# Patient Record
Sex: FEMALE | Race: ASIAN | NOT HISPANIC OR LATINO | Employment: UNEMPLOYED | URBAN - METROPOLITAN AREA
[De-identification: names, ages, dates, MRNs, and addresses within clinical notes are randomized per-mention and may not be internally consistent; named-entity substitution may affect disease eponyms.]

---

## 2021-04-30 ENCOUNTER — IMMUNIZATION (OUTPATIENT)
Dept: NURSING | Facility: CLINIC | Age: 21
End: 2021-04-30
Payer: COMMERCIAL

## 2021-04-30 PROCEDURE — 0001A PR COVID VAC PFIZER DIL RECON 30 MCG/0.3 ML IM: CPT

## 2021-04-30 PROCEDURE — 91300 PR COVID VAC PFIZER DIL RECON 30 MCG/0.3 ML IM: CPT

## 2021-05-02 ENCOUNTER — HEALTH MAINTENANCE LETTER (OUTPATIENT)
Age: 21
End: 2021-05-02

## 2021-05-21 ENCOUNTER — IMMUNIZATION (OUTPATIENT)
Dept: NURSING | Facility: CLINIC | Age: 21
End: 2021-05-21
Attending: INTERNAL MEDICINE
Payer: COMMERCIAL

## 2021-05-21 PROCEDURE — 0002A PR COVID VAC PFIZER DIL RECON 30 MCG/0.3 ML IM: CPT

## 2021-05-21 PROCEDURE — 91300 PR COVID VAC PFIZER DIL RECON 30 MCG/0.3 ML IM: CPT

## 2021-10-17 ENCOUNTER — HEALTH MAINTENANCE LETTER (OUTPATIENT)
Age: 21
End: 2021-10-17

## 2022-05-29 ENCOUNTER — HEALTH MAINTENANCE LETTER (OUTPATIENT)
Age: 22
End: 2022-05-29

## 2022-09-23 ENCOUNTER — PRENATAL OFFICE VISIT (OUTPATIENT)
Dept: MIDWIFE SERVICES | Facility: CLINIC | Age: 22
End: 2022-09-23
Payer: COMMERCIAL

## 2022-09-23 ENCOUNTER — HOSPITAL ENCOUNTER (OUTPATIENT)
Dept: ULTRASOUND IMAGING | Facility: HOSPITAL | Age: 22
Discharge: HOME | End: 2022-09-23
Attending: ADVANCED PRACTICE MIDWIFE | Admitting: ADVANCED PRACTICE MIDWIFE
Payer: COMMERCIAL

## 2022-09-23 ENCOUNTER — DOCUMENTATION ONLY (OUTPATIENT)
Dept: OBGYN | Facility: CLINIC | Age: 22
End: 2022-09-23

## 2022-09-23 VITALS
HEART RATE: 72 BPM | HEIGHT: 60 IN | BODY MASS INDEX: 44.37 KG/M2 | DIASTOLIC BLOOD PRESSURE: 76 MMHG | WEIGHT: 226 LBS | SYSTOLIC BLOOD PRESSURE: 118 MMHG

## 2022-09-23 DIAGNOSIS — Z91.89 AT RISK FOR VENOUS THROMBOEMBOLISM (VTE): ICD-10-CM

## 2022-09-23 DIAGNOSIS — Z13.31 POSITIVE DEPRESSION SCREENING: ICD-10-CM

## 2022-09-23 DIAGNOSIS — Z34.01 ENCOUNTER FOR SUPERVISION OF NORMAL FIRST PREGNANCY IN FIRST TRIMESTER: Primary | ICD-10-CM

## 2022-09-23 DIAGNOSIS — Z34.01 ENCOUNTER FOR SUPERVISION OF NORMAL FIRST PREGNANCY IN FIRST TRIMESTER: ICD-10-CM

## 2022-09-23 DIAGNOSIS — Z91.89 AT RISK FOR COMPLICATION OF PREGNANCY: ICD-10-CM

## 2022-09-23 LAB
ABO/RH(D): NORMAL
ANTIBODY SCREEN: NEGATIVE
ERYTHROCYTE [DISTWIDTH] IN BLOOD BY AUTOMATED COUNT: 14.1 % (ref 10–15)
HCT VFR BLD AUTO: 39.7 % (ref 35–47)
HGB BLD-MCNC: 13.2 G/DL (ref 11.7–15.7)
MCH RBC QN AUTO: 27 PG (ref 26.5–33)
MCHC RBC AUTO-ENTMCNC: 33.2 G/DL (ref 31.5–36.5)
MCV RBC AUTO: 81 FL (ref 78–100)
PLATELET # BLD AUTO: 249 10E3/UL (ref 150–450)
RBC # BLD AUTO: 4.89 10E6/UL (ref 3.8–5.2)
SPECIMEN EXPIRATION DATE: NORMAL
SPECIMEN EXPIRATION DATE: NORMAL
WBC # BLD AUTO: 8.7 10E3/UL (ref 4–11)

## 2022-09-23 PROCEDURE — 86780 TREPONEMA PALLIDUM: CPT | Performed by: ADVANCED PRACTICE MIDWIFE

## 2022-09-23 PROCEDURE — 99205 OFFICE O/P NEW HI 60 MIN: CPT | Performed by: ADVANCED PRACTICE MIDWIFE

## 2022-09-23 PROCEDURE — 86900 BLOOD TYPING SEROLOGIC ABO: CPT | Performed by: ADVANCED PRACTICE MIDWIFE

## 2022-09-23 PROCEDURE — 99207 PR FIRST OB VISIT: CPT | Performed by: ADVANCED PRACTICE MIDWIFE

## 2022-09-23 PROCEDURE — 86803 HEPATITIS C AB TEST: CPT | Performed by: ADVANCED PRACTICE MIDWIFE

## 2022-09-23 PROCEDURE — 86762 RUBELLA ANTIBODY: CPT | Performed by: ADVANCED PRACTICE MIDWIFE

## 2022-09-23 PROCEDURE — 36415 COLL VENOUS BLD VENIPUNCTURE: CPT | Performed by: ADVANCED PRACTICE MIDWIFE

## 2022-09-23 PROCEDURE — 87086 URINE CULTURE/COLONY COUNT: CPT | Performed by: ADVANCED PRACTICE MIDWIFE

## 2022-09-23 PROCEDURE — 86901 BLOOD TYPING SEROLOGIC RH(D): CPT | Performed by: ADVANCED PRACTICE MIDWIFE

## 2022-09-23 PROCEDURE — 87340 HEPATITIS B SURFACE AG IA: CPT | Performed by: ADVANCED PRACTICE MIDWIFE

## 2022-09-23 PROCEDURE — 85027 COMPLETE CBC AUTOMATED: CPT | Performed by: ADVANCED PRACTICE MIDWIFE

## 2022-09-23 PROCEDURE — 87389 HIV-1 AG W/HIV-1&-2 AB AG IA: CPT | Performed by: ADVANCED PRACTICE MIDWIFE

## 2022-09-23 PROCEDURE — 87591 N.GONORRHOEAE DNA AMP PROB: CPT | Performed by: ADVANCED PRACTICE MIDWIFE

## 2022-09-23 PROCEDURE — 87491 CHLMYD TRACH DNA AMP PROBE: CPT | Performed by: ADVANCED PRACTICE MIDWIFE

## 2022-09-23 PROCEDURE — 86850 RBC ANTIBODY SCREEN: CPT | Performed by: ADVANCED PRACTICE MIDWIFE

## 2022-09-23 PROCEDURE — 76801 OB US < 14 WKS SINGLE FETUS: CPT

## 2022-09-23 NOTE — PROGRESS NOTES
PRENATAL VISIT   FIRST OBSTETRICAL EXAM - OB     Assessment / Impression   1.  22-year-old G1, P0 with IUP at 11 weeks 2 days by certain LMP with 28-day cycles, DEYA 4/12/2023 (although she predicts that she ovulated later in her cycle on July 29 predicting EGA 10 weeks 0 days, DEYA 4/21/2023)  2.  Pregravid BMI 43  3.  At risk for antepartum VTE (pregravid BMI greater than 40)  4.  Positive depression screening  5.  At risk for pregnancy complication (Preeclampsia: [place], Pregravid obesity AND GDM: Pregravid obesity,  descent)    Plan:   -Plan to start aspirin 81 mg 1 p.o. daily after 12 weeks of pregnancy due to preeclampsia risk factors: First baby and pregravid BMI greater than 40 (43).  -IOB labs drawn.   -Pt is a candidate for drawing lead level per [facility] screening tool.  Plan to draw next visit.  -Reviewed prenatal care schedule.   -Optimal nutrition and weight gain discussed. Pregnancy weight gain of no weight gain (BMI 40 or greater) encouraged.   -Anticipatory guidance for common pregnancy questions and concerns reviewed.   -Danger s/sx for this trimester reviewed with patient.   -Reviewed genetic screening options with patient, patient Is uncertain regarding first trimester screening. The patient Is uncertain regarding quad screening.   -Reviewed carrier testing options with patient, patient Is uncertain.    -IOB packet given and reviewed with patient.   -CN services and hospital options reviewed; emergency and scheduling phone numbers given to patient.   -Because the patient does have 2 or more moderate risk factors, low-dose aspirin will be initiated at 12 weeks.   -Antepartum VTE risk factors present.  -Patient is at increased risk for overt diabetes, so early 1 hour GCT will be ordered next visit.    -Pt is not a candidate for an antepartum OB consult.    -Return to clinic in 4 weeks or sooner as needed.    Total time: 60 minutes spent on the date of the encounter doing chart review, review of  test results, patient visit and documentation.     Subjective:   Amanda Miller is a 22 year old G 1 P 0 here today for her first obstetrical exam at 11 w 2 d. Here with , Rojelio. This pregnancy is planned. The patient reports nausea, but no vomiting, fatigue and some mild breast tenderness.  Patient's last menstrual period was 2022., predicting an expected date of delivery of Estimated Date of Delivery: 2023. Last period was normal. Her previous three cycles were normal. Her pregnancy is dated by certain LMP.  Presumed ovulation date (utilizing luteinizing hormone ovulation tests) 2022 predicting DEYA 2023 (EGA 10 weeks 0 days).     The patient states that she is in a monogamous relationship. Offered GC/CT screening today and patient accepts.  Current symptoms also include: breast tenderness, fatigue, morning sickness, nausea and positive home pregnancy test.     Risk factors: pre-pregnancy obesity. Pregnancy Risk Factors:Significantly overweight or underweight    The patient has the following high risk factors for preeclampsia:none. The patient has the following moderate risk factors for preeclampsia:first pregnancy and BMI greater than 30.     The patient has the following antepartum risk factors for VTE (two or more risk factors, or 1 * risk factor, places patient at higher risk): *BMI greater or equal to 40, current.   Clinical history/risk factors requiring antepartum OB consult: none.     The patient has the following risk factors for overt diabetes: Body mass index greater than or equal to 25 kg/m2. Plus the additional risk factor(s) of: High-risk race/ethnicity (eg, , , ,  American, ).    Social History:   Education level: Some college  Occupation: Pharmacy technician  Partners name: Rojelio   ?   OB History    Para Term  AB Living   1 0 0 0 0 0   SAB IAB Ectopic Multiple Live Births   0 0 0 0 0      #  "Outcome Date GA Lbr Carlos/2nd Weight Sex Delivery Anes PTL Lv   1 Current                 History:   Past Medical History:   Diagnosis Date     Obesity      Urinary tract infection       Past Surgical History:   Procedure Laterality Date     TONSILLECTOMY       WISDOM TOOTH EXTRACTION        Family History   Problem Relation Age of Onset     Depression Mother      Depression Father      Cancer Paternal Grandmother       Social History     Tobacco Use     Smoking status: Never Smoker     Smokeless tobacco: Never Used   Vaping Use     Vaping Use: Never used   Substance Use Topics     Alcohol use: Not Currently     Drug use: Not Currently      Current Outpatient Medications   Medication Sig Dispense Refill     Prenatal Vit-Fe Fumarate-FA (PRENATAL VITAMIN PO)         No Known Allergies     The patient's medical, surgical and family histories were reviewed and were pertinent to this visit.     Pap smear: Last Pap: 7/16/2021, Result: NILM, Previous History: None, Any history of abnormal: No. Next Due: 2024.     EPDS score today: 10 .\"  0\" to #10  ALL-7: 2, \"not difficult at all\"  History of anxiety or depression: no    Review of Systems   General: Fatigue but otherwise denies problem   Eyes: Denies problem   Ears/Nose/Throat: Denies problem   Cardiovascular: Denies problem   Respiratory: Denies problem   Gastrointestinal: Nausea without vomiting, otherwise negative   Genitourinary: Denies any discharge, vaginal bleeding or itchiness or any other problem   Musculoskeletal: Breast tenderness otherwise denies problem   Skin: Denies problem   Neurologic: Denies problem   Psychiatric: Denies problem   Endocrine: Denies problem   Heme/Lymphatic: Denies problem   Allergic/Immunologic: Denies problem         Objective:   Objective    Vitals:    09/23/22 1305   BP: 118/76   Pulse: 72   Weight: 102.5 kg (226 lb)   Height: 1.53 m (5' 0.25\")        Physical Exam:   General Appearance: Alert, cooperative, no distress, appears stated " age   ELIZABETH: Normocephalic, without obvious abnormality, atraumatic. Conjunctiva/corneas clear, does wear corrective lenses   Neck: Supple, symmetrical, trachea midline, no adenopathy.   Thyroid: not enlarged, symmetric, no tenderness/mass/nodules   Back: Symmetric, ROM normal, no CVA tenderness   Lungs: Clear to auscultation bilaterally, respirations unlabored   Heart: Regular rate and rhythm, S1 and S2 normal, no murmur. No edema to lower extremities.   Breasts: Deferred  Abdomen: Soft, non-tender.   FHT: 156 bpm  Pelvic exam: Deferred  Musculoskeletal: Extremities normal, atraumatic, no cyanosis   Skin: Skin color, texture, turgor normal, no rashes or lesions   Neurologic: Alert and oriented x 3. Normal speech

## 2022-09-23 NOTE — LETTER
September 23, 2022      Amanda Miller  [address]  [place] [address]        To Whom It May Concern:    Amanda Miller was seen on 9/23/2022.  She is 11 weeks 2 days pregnant with a due date of 4/12/2023.  She is experiencing some nausea of pregnancy which may have caused her to miss work.  I have recommended medications to alleviate her nausea of pregnancy.       Sincerely,        Romy Ding CNM

## 2022-09-24 PROBLEM — Z91.89 AT RISK FOR COMPLICATION OF PREGNANCY: Status: ACTIVE | Noted: 2022-09-24

## 2022-09-24 LAB
C TRACH DNA SPEC QL NAA+PROBE: NEGATIVE
HBV SURFACE AG SERPL QL IA: NONREACTIVE
HCV AB SERPL QL IA: NONREACTIVE
HIV 1+2 AB+HIV1 P24 AG SERPL QL IA: NONREACTIVE
N GONORRHOEA DNA SPEC QL NAA+PROBE: NEGATIVE
T PALLIDUM AB SER QL: NONREACTIVE

## 2022-09-25 LAB — BACTERIA UR CULT: NORMAL

## 2022-09-26 LAB
RUBV IGG SERPL QL IA: 1.15 INDEX
RUBV IGG SERPL QL IA: POSITIVE

## 2022-09-27 ASSESSMENT — PATIENT HEALTH QUESTIONNAIRE - PHQ9: 5. POOR APPETITE OR OVEREATING: NOT AT ALL

## 2022-09-27 ASSESSMENT — ANXIETY QUESTIONNAIRES
3. WORRYING TOO MUCH ABOUT DIFFERENT THINGS: NOT AT ALL
7. FEELING AFRAID AS IF SOMETHING AWFUL MIGHT HAPPEN: SEVERAL DAYS
IF YOU CHECKED OFF ANY PROBLEMS ON THIS QUESTIONNAIRE, HOW DIFFICULT HAVE THESE PROBLEMS MADE IT FOR YOU TO DO YOUR WORK, TAKE CARE OF THINGS AT HOME, OR GET ALONG WITH OTHER PEOPLE: NOT DIFFICULT AT ALL
1. FEELING NERVOUS, ANXIOUS, OR ON EDGE: NOT AT ALL
2. NOT BEING ABLE TO STOP OR CONTROL WORRYING: NOT AT ALL
GAD7 TOTAL SCORE: 2
6. BECOMING EASILY ANNOYED OR IRRITABLE: SEVERAL DAYS
GAD7 TOTAL SCORE: 2
5. BEING SO RESTLESS THAT IT IS HARD TO SIT STILL: NOT AT ALL

## 2022-10-02 ENCOUNTER — HEALTH MAINTENANCE LETTER (OUTPATIENT)
Age: 22
End: 2022-10-02

## 2022-10-21 ENCOUNTER — PRENATAL OFFICE VISIT (OUTPATIENT)
Dept: MIDWIFE SERVICES | Facility: CLINIC | Age: 22
End: 2022-10-21
Payer: COMMERCIAL

## 2022-10-21 VITALS
OXYGEN SATURATION: 99 % | WEIGHT: 224 LBS | DIASTOLIC BLOOD PRESSURE: 62 MMHG | HEART RATE: 69 BPM | HEIGHT: 60 IN | BODY MASS INDEX: 43.98 KG/M2 | SYSTOLIC BLOOD PRESSURE: 108 MMHG

## 2022-10-21 DIAGNOSIS — Z13.79 GENETIC SCREENING: ICD-10-CM

## 2022-10-21 DIAGNOSIS — Z91.89 AT RISK FOR COMPLICATION OF PREGNANCY: ICD-10-CM

## 2022-10-21 DIAGNOSIS — Z34.01 ENCOUNTER FOR SUPERVISION OF NORMAL FIRST PREGNANCY IN FIRST TRIMESTER: Primary | ICD-10-CM

## 2022-10-21 PROBLEM — F51.01 INSOMNIA, IDIOPATHIC: Status: ACTIVE | Noted: 2021-07-26

## 2022-10-21 PROBLEM — F51.01 INSOMNIA, IDIOPATHIC: Status: RESOLVED | Noted: 2021-07-26 | Resolved: 2022-10-21

## 2022-10-21 LAB — GLUCOSE 1H P 50 G GLC PO SERPL-MCNC: 127 MG/DL (ref 70–129)

## 2022-10-21 PROCEDURE — 99207 PR PRENATAL VISIT: CPT | Performed by: ADVANCED PRACTICE MIDWIFE

## 2022-10-21 PROCEDURE — 83655 ASSAY OF LEAD: CPT | Mod: 90 | Performed by: ADVANCED PRACTICE MIDWIFE

## 2022-10-21 PROCEDURE — 36415 COLL VENOUS BLD VENIPUNCTURE: CPT | Performed by: ADVANCED PRACTICE MIDWIFE

## 2022-10-21 PROCEDURE — 82950 GLUCOSE TEST: CPT | Performed by: ADVANCED PRACTICE MIDWIFE

## 2022-10-21 PROCEDURE — 99000 SPECIMEN HANDLING OFFICE-LAB: CPT | Performed by: ADVANCED PRACTICE MIDWIFE

## 2022-10-21 RX ORDER — ACETAMINOPHEN 500 MG
500-1000 TABLET ORAL EVERY 6 HOURS PRN
Status: ON HOLD | COMMUNITY
End: 2023-05-01

## 2022-10-21 NOTE — PROGRESS NOTES
Amanda presents to the clinic today with Rojelio.  All is well, especially considering nausea and vomiting of pregnancy has completely resolved!  Total weight gain thus far: -2 pounds in the setting of pregravid BMI 43.  She denies lower abdominal cramping or vaginal bleeding.  Initial OB lab results reviewed in their entirety.  She desires noninvasive prenatal testing obtained today with early 1 hour glucose challenge test and lead screening.  Encouraged to initiate aspirin 81 m p.o. daily due to risk for preeclampsia (first baby and pregravid BMI greater than 40).  Second trimester teaching completed.  Danger signs and symptoms reviewed.  All questions answered.  Encouraged to call or RTC with any questions, concerns, or as needed.

## 2022-10-24 LAB — LEAD BLDV-MCNC: <2 UG/DL

## 2022-10-26 LAB — SCANNED LAB RESULT: NORMAL

## 2022-11-17 ENCOUNTER — TRANSCRIBE ORDERS (OUTPATIENT)
Dept: MATERNAL FETAL MEDICINE | Facility: HOSPITAL | Age: 22
End: 2022-11-17

## 2022-11-17 ENCOUNTER — PRENATAL OFFICE VISIT (OUTPATIENT)
Dept: MIDWIFE SERVICES | Facility: CLINIC | Age: 22
End: 2022-11-17
Payer: COMMERCIAL

## 2022-11-17 VITALS
HEART RATE: 80 BPM | DIASTOLIC BLOOD PRESSURE: 62 MMHG | BODY MASS INDEX: 43 KG/M2 | WEIGHT: 222 LBS | SYSTOLIC BLOOD PRESSURE: 118 MMHG

## 2022-11-17 DIAGNOSIS — Z13.79 GENETIC SCREENING: ICD-10-CM

## 2022-11-17 DIAGNOSIS — Z34.02 ENCOUNTER FOR SUPERVISION OF NORMAL FIRST PREGNANCY IN SECOND TRIMESTER: Primary | ICD-10-CM

## 2022-11-17 DIAGNOSIS — Z34.01 ENCOUNTER FOR SUPERVISION OF NORMAL FIRST PREGNANCY IN FIRST TRIMESTER: ICD-10-CM

## 2022-11-17 DIAGNOSIS — O26.90 PREGNANCY RELATED CONDITION, ANTEPARTUM: Primary | ICD-10-CM

## 2022-11-17 DIAGNOSIS — Z91.89 AT RISK FOR COMPLICATION OF PREGNANCY: ICD-10-CM

## 2022-11-17 DIAGNOSIS — Z23 NEED FOR INFLUENZA VACCINATION: ICD-10-CM

## 2022-11-17 DIAGNOSIS — Z91.89 AT RISK FOR VENOUS THROMBOEMBOLISM (VTE): ICD-10-CM

## 2022-11-17 PROCEDURE — 99207 PR PRENATAL VISIT: CPT | Performed by: ADVANCED PRACTICE MIDWIFE

## 2022-11-17 PROCEDURE — 90471 IMMUNIZATION ADMIN: CPT | Performed by: ADVANCED PRACTICE MIDWIFE

## 2022-11-17 PROCEDURE — 90686 IIV4 VACC NO PRSV 0.5 ML IM: CPT | Performed by: ADVANCED PRACTICE MIDWIFE

## 2022-11-17 RX ORDER — ASPIRIN 81 MG/1
81 TABLET, CHEWABLE ORAL DAILY
Status: ON HOLD | COMMUNITY
End: 2023-05-01

## 2022-11-17 NOTE — PROGRESS NOTES
"Amanda presents with her  Rojelio.  VERY excited about upcoming fetal anatomy ultrasound for which a referral was made to [facility] for OB L2 US due to pregravid BMI 43.  Additionally, per our clinical practice guidelines, this writer entered an order for a fetal echocardiogram due to the same reason.  Morning sickness has passed, but, appetite is low and, \"I do not have any cravings!\"  Patient has lost 4 pounds thus far this pregnancy.  She denies lower abdominal pain, loss of fluid or vaginal bleeding.  This writer offered single marker AFP after noninvasive prenatal testing was performed on 10/21/2022 (negative for aneuploidy, XX) although patient DECLINES.  Accepting of influenza vaccine today.  Second trimester teaching completed.  Danger signs and symptoms reviewed.  All questions answered.  Encouraged to call or RTC with any questions, concerns, or as needed.  RTC in 4 weeks or sooner as needed.  "

## 2022-11-18 ENCOUNTER — PRE VISIT (OUTPATIENT)
Dept: MATERNAL FETAL MEDICINE | Facility: HOSPITAL | Age: 22
End: 2022-11-18

## 2022-11-23 ENCOUNTER — ANCILLARY PROCEDURE (OUTPATIENT)
Dept: ULTRASOUND IMAGING | Facility: HOSPITAL | Age: 22
End: 2022-11-23
Attending: ADVANCED PRACTICE MIDWIFE
Payer: COMMERCIAL

## 2022-11-23 ENCOUNTER — OFFICE VISIT (OUTPATIENT)
Dept: MATERNAL FETAL MEDICINE | Facility: HOSPITAL | Age: 22
End: 2022-11-23
Attending: ADVANCED PRACTICE MIDWIFE
Payer: COMMERCIAL

## 2022-11-23 ENCOUNTER — DOCUMENTATION ONLY (OUTPATIENT)
Dept: MIDWIFE SERVICES | Facility: CLINIC | Age: 22
End: 2022-11-23

## 2022-11-23 DIAGNOSIS — O26.90 PREGNANCY RELATED CONDITION, ANTEPARTUM: ICD-10-CM

## 2022-11-23 DIAGNOSIS — O99.212 MATERNAL OBESITY SYNDROME IN SECOND TRIMESTER: ICD-10-CM

## 2022-11-23 DIAGNOSIS — O28.3 ECHOGENIC INTRACARDIAC FOCUS OF FETUS ON PRENATAL ULTRASOUND: ICD-10-CM

## 2022-11-23 DIAGNOSIS — Z36.2 ENCOUNTER FOR FOLLOW-UP ULTRASOUND OF FETAL ANATOMY: Primary | ICD-10-CM

## 2022-11-23 PROCEDURE — 99202 OFFICE O/P NEW SF 15 MIN: CPT | Mod: 25 | Performed by: OBSTETRICS & GYNECOLOGY

## 2022-11-23 PROCEDURE — 76811 OB US DETAILED SNGL FETUS: CPT | Mod: 26 | Performed by: OBSTETRICS & GYNECOLOGY

## 2022-11-23 PROCEDURE — 76811 OB US DETAILED SNGL FETUS: CPT

## 2022-11-23 NOTE — PROGRESS NOTES
Amanda presents to [facility] clinic for ultrasound and consultation regarding maternal BMI > 40. Prenatal record was reviewed.    Impression:  1) Britt intrauterine pregnancy at 18w 2d gestational age.   2) An echogenic intracardiac focus is noted. Otherwise, none of the anomalies commonly detected by ultrasound were evident in the detailed fetal anatomic survey, however some views were seen suboptimally as outlined above.   3) Growth parameters and estimated fetal weight were consistent with established dates.  4) The amniotic fluid volume appeared normal.  5) Normal fetal activity for gestational age.  6) On transabdominal imaging the cervix appears long and closed.    Plan:  Thank-you for referring your patient for a comprehensive ultrasound.  She had cell-free DNA screening showing the expected amounts of chromosomes 21, 18 & 13.    I discussed the findings on today's ultrasound with the patient. There was a echogenic intracardiac foci seen on ultrasound today which is a very common finding. It has no structural or functional implications on cardiac function and further evaluation of EIF is not necessary either prenatally or postnatally. It is thought to be a weak marker of Down Syndrome however the patient has had NIPT drawn which was normal. As there are no other markers of aneuploidy, this finding is considered a variant of normal.     Follow-up is scheduled here in 4 weeks to reassess anatomy that was suboptimally seen today. We reviewed that if the fetal heart is well seen on follow up, there will be no indication for a fetal echocardiogram. We reviewed family history and there is no family history of congenital cardiac defects.    Following clearance of anatomy, serial ultrasounds to assess fetal growth are recommended every 4-6 weeks, with weekly  testing at 34 weeks due to maternal BMI > 40.    Return to primary provider for continued prenatal care.    If you have questions regarding today's  evaluation or if we can be of further service, please contact the [facility].    **Fetal anomalies may be present but not detected**    Patricia Cuenca MD  Maternal Fetal Medicine    Total time: 10 minutes

## 2022-12-01 ENCOUNTER — TELEPHONE (OUTPATIENT)
Dept: MIDWIFE SERVICES | Facility: CLINIC | Age: 22
End: 2022-12-01

## 2022-12-01 NOTE — TELEPHONE ENCOUNTER
"TC:  Late Entry  Amanda Murphy at 0151, call returned at 0204.  Reports went to Chiropractor yesterday and usually has adjustment while laying on abdomen, but was in a new position this time s/t pregnancy.  Reports since 0100 has felt pain in her back between her shoulder blades \"like an air bubble is in there\", worse when laying down, improved with sitting up and no pressure.  Not SOB or affected by breathing.  Feels \"like it needs to finish adjusting\".  Did take Tylenol and helping some.  Discussed okay for expectant management at home with warm/cold packs, menthol rubs, position changes like sleeping or resting in more upright positions.  If pain intolerable can go to ER for evaluation and pain control attempts.  If SOB or other more concerning symptoms proceed to urgent/emergent care.  Otherwise, advised to follow up with chiropractor in morning to discuss outcome of recent adjustment and ask for their advice.  No further questions, agrees with plan.    BLAYNE Rey CNM [facility]  12/1/2022 10:11 AM   "

## 2022-12-19 ENCOUNTER — PRENATAL OFFICE VISIT (OUTPATIENT)
Dept: MIDWIFE SERVICES | Facility: CLINIC | Age: 22
End: 2022-12-19
Payer: COMMERCIAL

## 2022-12-19 VITALS
SYSTOLIC BLOOD PRESSURE: 108 MMHG | BODY MASS INDEX: 42.61 KG/M2 | HEART RATE: 79 BPM | OXYGEN SATURATION: 98 % | DIASTOLIC BLOOD PRESSURE: 64 MMHG | WEIGHT: 220 LBS

## 2022-12-19 DIAGNOSIS — Z13.31 POSITIVE DEPRESSION SCREENING: ICD-10-CM

## 2022-12-19 DIAGNOSIS — Z34.02 ENCOUNTER FOR SUPERVISION OF NORMAL FIRST PREGNANCY IN SECOND TRIMESTER: Primary | ICD-10-CM

## 2022-12-19 PROCEDURE — 99207 PR PRENATAL VISIT: CPT | Performed by: ADVANCED PRACTICE MIDWIFE

## 2022-12-19 NOTE — PATIENT INSTRUCTIONS
"[facility] Nurse [facility] Contact information:  Appointment line and to get a hold of CNM in clinic Monday-Friday 8 am - 5 pm:  [phone].  There are some clinics with early start times (1st appointment 7:40 am) and others with evening hours (last appointment 6:20 pm).  Most are typically open from 8 am to 5 pm.    CNM on call answering service: [phone].  Specify your hospital of choice and leave a brief message for CNM;  will then page CNM who is on call at your specified hospital and you should receive a call back with 15 minutes.  Be sure that your ringer is audible and that you can accept blocked calls so that we can get back in touch with you! This number should be reserved for urgent needs if during the day, before 8 am, after 5 pm, weekends, holidays.    Contact the on-call CNM with warning signs, such as:  vaginal bleeding   Vaginal discharge and itching or pain and burning during urination  Leg/calf pain or swelling on one side  severe abdominal pain  nausea and vomiting more than 4-5 times a day, or if you are unable to keep anything down  fever more than 100.4 degrees F.   Rift.iohart  After each of your visits you are welcome to check Liquid Grids for your visit summary including education and links to information relevant to your pregnancy and/or well woman care.   Find the \"Visits\" tab at the top of the page, you will see a list of recent visits and for each visit a for link for \"View After Visit Summary.\" View of your After Visit Summary will allow you to read our recommendations from your visit, review any education provided, and link to websites with useful information.   If you have any questions or difficulty navigating [employer], please feel free to contact us and we will do our best to direct you.  Meet the Midwives from [facility]  You are invited to an informational meet and greet with [facility]'s [facility] Certified Nurse-Midwives. Our free " "\"Meet the Midwives\" event is a great opportunity to learn about our midwives' philosophy and experience, the hospitals where we can assist with your birth, and answer questions you may have. Partners, friends, and family are welcome to attend. Currently, this is a virtual event.  Date  First Tuesday of every month at 7 pm.    Link to next (live) meeting  https://www.Logical Lighting.Med Access/classes-and-events/meet-the-midwives-from-[facility]  To Join by Telephone (audio only) Call:   [phone] Phone Conference ID: [phone]#      UNDERSTANDING  LABOR    Going into labor before your 37th week of pregnancy is called  labor.  labor can cause your baby to be born too soon. This can lead to a number of health problems that may affect your baby. From 28-35 weeks, Patients are advised to be evaluated at [facility] since they have a  Intensive Care Unit (NICU).  -Before labor, the cervix is thick and closed.  -In  labor, the cervix begins to efface (thin) and dilate (open) over a short period of time    Symptoms of  Labor  If you believe you re having  labor, contact the midwives right away. But contractions alone don t mean you re in  labor. What matters more are changes in your cervix (the lower end of the uterus). Symptoms of  labor include:  five or more contractions per hour  Strong & frequent contractions  Constant menstrual-like cramping  Low-back pain  Mucous or bloody vaginal discharge  Bleeding or spotting in the second or third trimester    Evaluating  Labor  Your midwife will try to find out whether you re in  labor or whether you re just having contractions.She may watch you for a few hours. The following tests may be done:  Pelvic exam to see if your cervix has effaced (thinned) and dilated (opened)  Uterine activity monitoring to detect contractions  Fetal monitoring to check the health of your " baby  Ultrasound to check your baby s size and position    Caring for Yourself At Home  If you have contractions  but your cervix is still thick and closed, the midwife may ask you to do the following at home:  Drink plenty of water.  Do fewer activities.  Rest in bed on your side.  Avoid intercourse and nipple stimulation.    When to Call Your Midwife  Five or more contractions per hour  Bag of water breaks  Bleeding or spotting     If You Need Hospital Care   labor often requires that you have hospital care and complete bed rest. You may have an IV (intravenous) line to get fluids. And you may be given pills or an injection to help prevent contractions. Finally, you may receive medication (corticosteroids) that helps your baby s lungs mature more quickly.    Are You At Risk?  Any pregnant woman can have  labor. It may start for no reason. But these risk factors can increase your chances:  Past  labor or past early birth  Smoking and drug or alcohol use during pregnancy  Multiple fetuses (twins or more)  Problems with the shape of the uterus  Bleeding during the pregnancy    The Dangers of  Birth  A baby born too soon may have health problems. This is because the baby didn t have enough time to mature. The baby then is at risk of:  Not breastfeeding well  Having immature lungs  Bleeding in the brain  Dying    Reaching Term  Your goal is to get as close to term as you can before giving birth. The closer you get to term, the higher your chance of having a healthy baby. Work with your healthcare provider. Together, you can take steps that may keep you from giving birth too early.        Testing for Gestational Diabetes in Pregnancy  [facility] are committed to providing safe care during your pregnancy. We follow the recommendations of the American Diabetes Association and the American College of Obstetricians and Gynecologists to test all pregnant women for gestational  diabetes. Testing early in pregnancy (if you have risk factors) and testing all women between 26-28 weeks follows local and national guidelines for care during pregnancy. Clients who feel that they cannot consent to such testing, may choose to transfer their care to our consultant obstetricians.  What is the test?  Eat normally on the day of the test; a diet rich in protein, whole grains, and vegetables would be best. Avoid simple sugars, white flour products and juices prior to testing.  You will be asked to drink a 10 oz glucose beverage (50 gm, about the same as a can of root beer).  The product is not carbonated as it has to be consumed within 5 minutes. During the next hour, you are seen for a prenatal visit, but are asked to limit your activity around the clinic. Feel free to bring a book or your computer.   Any level less than 130 for this  glucose challenge test  is considered normal. If measured at 140 to 185, the diagnostic test, a  3 hour glucose tolerance test  will be conducted. If 2 or more readings are abnormal, the diagnosis of gestational diabetes is confirmed and a referral to a diabetes educator will be made. If the level is 185 or above, this confirms the diagnosis and a referral will be made without administering the 3 hour test.  A fasting blood sugar may be performed sometime in the first trimester if you have risk factors for the development of gestational diabetes such as a previous diagnosis or birth of a large baby or a close family relative with diabetes.  What is gestational diabetes?  Your body converts what you eat into glucose. In response to rising blood sugar levels it secretes insulin in order to be able to utilize that glucose. During pregnancy as the placenta grows and matures, it secretes hormones that are necessary to assure baby s growth and development, however, they also reduce the action of insulin in the mother. In some cases too much insulin is blocked (this is called   insulin resistance ) and blood sugar in the mother rises above a normal level. Pregnant women who have never had diabetes before but who have high blood sugar (glucose) levels during pregnancy are said to have gestational diabetes. Gestational diabetes affects about 4-7% of all pregnancies.  What if I have gestational diabetes?  If either of the tests for gestational diabetes show that you have this condition, a referral will be made to visit with the diabetes educator. The educator will help you to make wise food choices, count carbohydrates, monitor your blood sugar and record your levels in a journal. We ask that you bring this diary with you at each prenatal visit. You may meet with the educator several times during the remainder of your pregnancy.  How gestational diabetes can affect your baby  Some mothers may wonder why testing for GDM is delayed until the early part of the 3rd trimester for most women. Gestational diabetes has an impact on the baby at the time of rapid body growth. When the mother s blood has elevated sugar levels, extra glucose crosses the placenta causing the baby to have excess weight gain ( macrosomia ). Some babies are too big to be born vaginally so their mother must have  births. Babies of mothers with uncontrolled gestational diabetes may have difficult births that can cause trauma to the mother and in rare circumstances, babies may suffer fractures or oxygen deprivation during birth. They may have difficulty maintaining their own blood sugar after birth and they may also have trouble adapting to life outside. Recent research indicates that babies of mothers with uncontrolled or undiagnosed gestational diabetes are at risk for obesity and type 2 diabetes. Women who develop gestational diabetes are more likely to develop type 2 diabetes within 15 years after their pregnancy.  Additional Information  The American College of Nurse Midwives (ACNM) provides an information sheet  describing diabetes screening in pregnancy: http://www.womensdocs.com/josefa/pdf/Second_Trimester/Gestational_Diabetes.pdf    You can visit the American Diabetes Association website http://www.diabetes.org for additional information and to purchase their book,  Gestational Diabetes: What to Expect .    A brochure from the American College of Obstetrics and Gynecology is available at:   http://www.acog.org/~/media/For%20Patients/lth785.pdf?dmc=1&ta=[id number]  Testing for gestational diabetes is a critical part of your prenatal journey. Thank you for taking good care of yourself and your baby.    HEALTHY PREGNANCY CARE: 22-26 WEEKS PREGNANT    You are finishing your second trimester. Your baby is developing rapidly. At this stage, babies have a sense of balance, can respond to touch, and are recognizing parent voices.  Your baby will be moving around more now.  You may notice Frederick-Morris contractions now, which are painless and prepare the uterus for the delivery.    Nutrition: During this time, you may find that your nausea and fatigue are gone. Overall, you may feel better and have more energy than you did in your first trimester. Be sure you are getting enough calcium and iron in your diet. Your prenatal vitamins cannot supply all of the nutrients you need, so continue to eat 3-4 servings of dairy foods and 2-3 servings of meat/fish/poultry/nuts every day. Foods high in iron include: red meats, eggs, dark green vegetables, dark yellow vegetables, nuts, kidney beans and chickpeas. Some cereals are fortified with iron, so look at the food labels for 100% of the daily requirement for iron.     Discuss your work situation with your midwife or physician as needed. If you stand for long periods of time, you may need to make changes and take breaks.    [place] for childbirth and parenting classes, including an infant CPR class. Breastfeeding classes are recommended too.    Childbirth and Parenting Education:        Everyday Miracles:   https://www.everyday-miracles.org/    Free Video Series from [facility]: https://nursing.[facility]/academics/specialty-areas/nurse-midwifery/having-baby-prenatal-videos/having-baby-prenatal-and    [facility]: http://[facility] DarlingtonCollisionable.Thinking Screen Media/   [phone]-WZJR  [place]: (education, yoga & wellness) www.Rational RoboticsaZamzee  Enlightened Mama: www.enlightenedmama.Thinking Screen Media   Childbirth collective: (Parent topic nights)  www.childbirthcollective.org/  Hypnobabies:  www.hypnobabiestBandcamp.Thinking Screen Media/  Hypnobirthing:  Http://hypnobirthing.Thinking Screen Media/  The Birth Hour: https://Epidemic Sound/online-childbirth-class/    APPS and Podcasts:   Homero Powers Nurture    Evidence Based Birth  The Birth Hour (for birth stories)   Birthful   Expectful   The Longest Shortest Time  PregnancyPodcast Lori Rust    Book Recommendations:   Jackie Karen's Birthing From Within--first few chapters include a new-age tone, you may prefer to skip it and keep going, because there is good stuff later.  This book recommendation covers emotional preparation, but does cover coping with pain, and use of both pharmacological and nonpharmacological methods.    Dr. Costa' The Pregnancy Book and The Birth Book--the pregnancy book goes month-by month      The Birth Partner by Arelis Robbins    Womanly Art of Breastfeeding by La Leche League International   Bestfeeding by Marie Keller--great pictures    Mothering Your Nursing Toddler, by Angy Burgos.   Addresses dealing with so many of the challenging behaviors of a nursing toddler.  How Weaning Happens, by La Leche League.  Discusses weaning at all ages, from medically necessary weaning of an infant, all the way up to age 5 (or older), with why/why not, and strategies.  Very empowering book both for deciding to wean and deciding not to.    American College of Nurse-Midwives (ACNM) http://www.midwife.org/; look at the informational handouts at  "http://www.midwife.org/Share-With-Women     www.mymidwife.org    Mother to Baby (Medication and Herbal guidance in pregnancy): http://www.mothertobaby.org  Toll-Free Hotline: 580.886.6623  LactMed (Medication use while breastfeeding): http://toxnet.nlm.nih.gov/newtoxnet/lactmed.htm    Women's Health.gov:  http://www.womenshealth.gov/a-z-topics/index.html    American pregnancy association - http://americanpregnancy.org    Centering Pregnancy (group prenatal care option): http://centeringhealthcare.org    Information about doulas:  Childbirth collective: http://www.childbirthcollective.org/  Doulas of [place] (DEBBIE):  www.debbie.org  [facility]  project: http://Strohl Medicaltiesdoulaproject.com/     Early Childhood and Family Education (ECFE):  ECFE offers parents hands-on learning experiences that will nourish a lifetime of teachable moments.  http://ecfe.info/ecfe-home/    March of Dimes www.marchofPayScale.com     FDA - Nutrition  www.mypyramid.gov  Under \"For Consumers,\" click on \"pregnant and breastfeeding women.\"      Centers for Disease Control and Prevention (CDC) - Vaccines : http://www.cdc.gov/vaccines/       When researching information on the web, question the validity of websites.  The domains .gov, .edu and.org tend to be more reliable information.  If there are a lot of advertisements, be cautious of the information provided. Stay away from blogs and chat rooms please!    How can you care for yourself at home?   You can refer to the Starting Out Right book or find it online at http://www.[facility]altheast.org/images/stories/maternity/[facility]-Starting-Out-Right.pdf or http://www.healtheast.org/images/stories/flipbooks/[facility]-starting-out-right/[facility]-starting-out-right.html#p=8     You can sign up for a weekly parenting e-mail that gives support, tips and advice from health care professionals that starts with pregnancy and continues through the toddler years. To register, go to " www.healtheast.org/baby at any time during your pregnancy.      Baby Feeding in the Hospital: Information, Support and Resources    As you prepare for the birth of your child, you will want to consider options for feeding your baby including breast-feeding and/or baby formula. The American Academy of Pediatrics recommends exclusive breast-feeding for the first six months (although any amount of breast-feeding is beneficial).  However, we also understand that breast-feeding is a personal choice and not for everyone. Whether or not you choose to breast-feed, your decision will be respected by our staff.    There are numerous benefits of breast-feeding; here are a few to consider:  Provides antibodies to protect your baby from infections and diseases  The cost: formula can cost over $1,500 per year  Convenience, no warming up or sterilizing bottles and supplies  The physical contact with breastfeeding can make babies feel secure, warm and comforted    What ever my feeding choice, what can I expect after I deliver my baby?  Your baby will usually be placed skin-to-skin immediately following birth. The skin to skin contact between you and your baby will be a special and memorable time. The bonding and attachment comforts your baby and has a positive effect on baby s brain development.   Having your baby  room in  with you also helps you start to learn your baby s body rhythms and sleep cycle.    You will also begin to learn your baby s cues (signals) that he or she is ready to feed.    When do I start to feed my baby?  As soon as possible after your baby s birth, you will be encouraged to begin feeding.  In the first couple of weeks, your baby will eat often.  Breastfeeding babies usually eat at least 8 times in 24 hours.  Babies fed formula usually eat at least 7 times in 24 hours.      Breast-feeding tips:  Get comfortable and use pillows for support.  Have your baby at the level of your breast, facing you,  tummy to  tummy .    Touch your nipple to your baby s lips so you baby s mouth opens wide (rooting reflex).  Aim the nipple toward the roof of your baby s mouth. When your baby opens his or her mouth, pull your baby toward your breast to help your baby  latch on  to your nipple and much of the areola area.  Hand expressing your breast milk can assist with latching your baby to your breast, if needed.  Ask for help, breastfeeding may seem awkward or uncomfortable at first, this is normal. There are numerous resources available at [facility], Clinics and beyond.   If your goal is to exclusively breastfeed, avoid using any formula or artificial nipples (including bottles and pacifiers) while you are your baby are learning to breastfeed unless there is a medical reason.     Mixing breastfeeding and formula can interfere with how you begin building your milk supply.  It can impact how you and your baby  learn  to breastfeeding together and alter the natural growth of  good  bacteria in your baby s stomach.  Delay a pacifier or a bottle in the first few weeks until breastfeeding is well established. This is often around 3 weeks of age.  Ask your nurse to show you how to hand express.   Breast milk can be kept in the refrigerator or freezer for later use.        Touring the Maternity Care Center  [facility] Maternity Care:   https://[facility].org/locations/the-birthplace-[facility] Maternity Care:   https://[facility].org/locations/the-birthplace-at[facility]    Hospital and Clinic  Resources:  -Schedule an appointment with a [facility] Nurse Midwives [facility]   CNBON who is also a Lactation Consultant by calling [phone]     -Schedule a clinic appointment with a [facility] Nurse Midwives [facility] ALESSANDRA with dedicated clinic hours for breastfeeding assistance by calling [phone]. Breastfeeding clinic visits  are at [facility] on , [facility] on  and [facility] on .     New Parent Connection:   Cecille Nolasco, [address] [facility]  In-person meetings on  from 6 pm - 7:15 pm for parents of  to 9 months, at the same site.   All are free, drop-in, no registration required.    There are also free virtual meetings ongoing on :  11:30 am - 12:30 pm for parents of newborns to 3 months  4:15 pm to 5:15 pm for parents of 3 to 9-month olds  For joining info parents should call Kika Kwabena at [phone]      [facility] Baby Café  Due to COVID-19, all Baby Café sessions are canceled until further notice. For lactation support, please contact one of our bilingual staff:  Alesha (IBCLC) [phone]  Ame (IBCLC/ Bahamian) [phone]  Sekou (Hmong) [phone]  Dylan (Indian) [phone]  Baby Café is a free, drop-in service offering breastfeeding/chestfeeding support. Come share tips and socialize with other pregnant, breastfeeding/chestfeeding families. Babies and siblings are welcome (no  available).  We offer:  Professionally trained lactation staff.  Resource books for lending.  Relaxed and fun atmosphere.  Refreshments.  Locations  Baby Café is offered at several locations.  Please see below for the Baby Café closest to you.  CANCELED UNTIL FURTHER NOTICE  [facility]  [address] [facility], [address]   of the month   10 a.m. - Noon  CANCELED UNTIL FURTHER NOTICE  [facility]  [address], [place], [address]  4th  of the month   10 a.m. - 12:30 p.m.     CANCELED UNTIL FURTHER NOTICE  [facility]  [address], [place], [address]  4th  of the month  4 - 6 p.m.  CANCELED UNTIL FURTHER NOTICE  [facility]  [address], [place], [facility]   of the month.  9:30-11:30 a.m.  Enter through the east end of the building,  the blue Door C.  Ring the ECFE buzzer to be let in.   More information  Ame Morin  [phone]  [email or web address].     -Attend a baby weigh in at [facility].  Lactation consultants are available to answer questions  [place]: Tuesdays 1:00 - 2:00  [facility]: Mondays 1:00 - 2:00  www.[facility] HospitalNumerous.eYantra Industries    -Attend one of the New Mama groups at [facility] in [facility].  [facility] also offers one-on-one in home and in office lactation consults.   www.[facility].eY[employer]    -Attend a Patrick Gonsales meeting.  Multiple groups in several locations throughout the [facility]. The meetings are no-cost and always informative breastfeeding education session through [employer]atal La Leche League  Www.jose f.org/  Medication use while breastfeeding: http://toxnet.nlm.nih.gov/newtoxnet/lactmed.htm     Online Resources:  [facility]al[facility]ast.org/baby sign up for free online weekly e-mail  [facility]altheast.org/maternity  Breastfeedingmadesimple.com  Llli.org (La Leche League)  Normalfed.com  Womenshealth.gov/breastfeeding  Workandpump.com    Breast-feeding Supplies & Pumps:  Talk to your insurance provider or WIC (Women, Infants and Children) to learn more about options available to you. Recent health insurance changes may include additional coverage for supplies and pumps.    Public Health:  Women, Infants and Children Nutrition program (WIC): provides breast-feeding support and education in addition to formal feeding moms. 460-GIL-6166 or http://www.health.[facility].mn.us/divs/fh/wic    Family Health Home Visiting: Public Health Nurse home visits are available. Talk to your provider to see if you qualify. Most counties have a program available.    Additional Resources:  La Leche League is an international, nonprofit, nonsectarian organization offering information, education, and support to mothers who want to breast-feed their babies. Local groups offer phone help and monthly meetings. Visit  "RailswareeaSavara Pharmaceuticalse.org or llli.org and us the  Find local support  drop down menu or click on the  Resources  tab.    [place] Breastfeeding Resources: 8-408-357-BABY () toll free    National Breastfeeding Help Line trained breastfeeding peer counselors can help answer common breast-feeding questions by phone. Monday-Friday: English/Khmer  3-076- 374-5415 toll free, 1-868.779.2180 (TTY)    [facility] Connection: 670-493[facility] (8354)      Virtual Breastfeeding Support:    During this time of isolation, breastfeeding families need even more community!  Here are some area organizations offering virtual support groups for breastfeeding:    [employer] Support Group,  at 10:30 am   Run by KRISTIAN Montes De Oca of [employer]   Go to [employer] Facebook page and click on \"events\" for link   [email or web address]  [facility] Milk Hour,  at 2:30 pm    Run by KRISTIAN Gutierrez   Go to [facility] FB page and send message to get link   [email or web address]  [facility]/[place] holding virtual meetings the first Tuesday of each month, 8-9 pm, and the   Third Saturday, 10 - 11 am.  Go to [facility] and [place] FB page; message to get link [email or web address]  [facility] offers a Lactation Lounge every Friday 12pm - 1pm, run by Vishal Cao Leader   Sign up via link at [employer]/cbe-lactation   [email or web address]  [facility] is offering virtual support groups every Monday, 10:30 am - 12 pm, run by nurse IBCLC   [email or web address]    Prenatal Breastfeeding Classes:      [facility] is offering virtual breastfeeding and  care classes:  " [email or web address]/education-[email or web address]  [employer] childbirth and breastfeeding education offering virtual prenatal breastfeeding classes  [email or web address]

## 2022-12-19 NOTE — PROGRESS NOTES
Amanda is with Meera Ocasio alone for a routine prenatal visit at 22w0d. Anatomy scan reviewed, views were limited due to gestational age so follow-up scheduled for later this week.  Sleeping fairly well.  Fetal movement since 17 weeks.  Considering childbirth education options. Mid pregnancy anticipatory guidance reviewed. Enc follow-up in 4weeks or sooner prn.  Plan GCT, hemoglobin, RPR at next visit.

## 2022-12-23 ENCOUNTER — OFFICE VISIT (OUTPATIENT)
Dept: MATERNAL FETAL MEDICINE | Facility: HOSPITAL | Age: 22
End: 2022-12-23
Attending: OBSTETRICS & GYNECOLOGY
Payer: COMMERCIAL

## 2022-12-23 ENCOUNTER — ANCILLARY PROCEDURE (OUTPATIENT)
Dept: ULTRASOUND IMAGING | Facility: HOSPITAL | Age: 22
End: 2022-12-23
Attending: OBSTETRICS & GYNECOLOGY
Payer: COMMERCIAL

## 2022-12-23 DIAGNOSIS — Z36.2 ENCOUNTER FOR FOLLOW-UP ULTRASOUND OF FETAL ANATOMY: ICD-10-CM

## 2022-12-23 DIAGNOSIS — O99.212 MATERNAL OBESITY SYNDROME IN SECOND TRIMESTER: Primary | ICD-10-CM

## 2022-12-23 PROCEDURE — 99212 OFFICE O/P EST SF 10 MIN: CPT | Mod: 25 | Performed by: OBSTETRICS & GYNECOLOGY

## 2022-12-23 PROCEDURE — 76816 OB US FOLLOW-UP PER FETUS: CPT

## 2022-12-23 PROCEDURE — G0463 HOSPITAL OUTPT CLINIC VISIT: HCPCS | Mod: 25 | Performed by: OBSTETRICS & GYNECOLOGY

## 2022-12-23 PROCEDURE — 76816 OB US FOLLOW-UP PER FETUS: CPT | Mod: 26 | Performed by: OBSTETRICS & GYNECOLOGY

## 2022-12-23 NOTE — PROGRESS NOTES
[facility] Clinic Visit    Amanda presents to [facility] clinic for ultrasound and recommendations. The following problems were addressed:    Maternal obesity     Tests Reviewed: previous ultrasound report  Tests Ordered: follow up obstetric ultrasound  Unique Records reviewed: [facility]  Prenatal record    Impression:  1) Britt intrauterine pregnancy at 22w 4d weeks gestational age.   2) None of the anomalies commonly detected by ultrasound were evident in the fetal anatomic survey as described above, specifically views that were previously suboptimal appear normal today.   3) Growth parameters and estimated fetal weight were consistent with established dates.  4) The amniotic fluid volume appeared normal.  5) Normal fetal activity for gestational age.  6) On transabdominal imaging the cervix appears long and closed.    Plan:  Thank-you for referring your patient for an ultrasound to reassess anatomy that was previously suboptimally seen on comprehensive survey.     I discussed the findings on today's ultrasound with the patient. Serial ultrasounds to assess fetal growth will be scheduled starting at 28 weeks due to maternal BMI > 40. Weekly  testing will begin at 34 weeks.    Return to primary provider for continued prenatal care.    If you have questions regarding today's evaluation or if we can be of further service, please contact the Maternal-Fetal Medicine Center.    **Fetal anomalies may be present but not detected**    Patricia Cuenca MD  Maternal Fetal Medicine    Total Time: 8 min

## 2023-01-23 ENCOUNTER — PRENATAL OFFICE VISIT (OUTPATIENT)
Dept: MIDWIFE SERVICES | Facility: CLINIC | Age: 23
End: 2023-01-23
Payer: COMMERCIAL

## 2023-01-23 VITALS
SYSTOLIC BLOOD PRESSURE: 114 MMHG | BODY MASS INDEX: 43.19 KG/M2 | WEIGHT: 223 LBS | DIASTOLIC BLOOD PRESSURE: 62 MMHG | HEART RATE: 72 BPM

## 2023-01-23 DIAGNOSIS — O09.90 SUPERVISION OF HIGH RISK PREGNANCY, ANTEPARTUM: Primary | ICD-10-CM

## 2023-01-23 DIAGNOSIS — Z13.31 POSITIVE DEPRESSION SCREENING: ICD-10-CM

## 2023-01-23 PROBLEM — Z34.02 ENCOUNTER FOR SUPERVISION OF NORMAL FIRST PREGNANCY IN SECOND TRIMESTER: Status: ACTIVE | Noted: 2022-09-23

## 2023-01-23 LAB
GLUCOSE 1H P 50 G GLC PO SERPL-MCNC: 100 MG/DL (ref 70–129)
HGB BLD-MCNC: 12.6 G/DL (ref 11.7–15.7)
HOLD SPECIMEN: NORMAL

## 2023-01-23 PROCEDURE — 85018 HEMOGLOBIN: CPT | Performed by: ADVANCED PRACTICE MIDWIFE

## 2023-01-23 PROCEDURE — 36415 COLL VENOUS BLD VENIPUNCTURE: CPT | Performed by: ADVANCED PRACTICE MIDWIFE

## 2023-01-23 PROCEDURE — 99207 PR PRENATAL VISIT: CPT | Performed by: ADVANCED PRACTICE MIDWIFE

## 2023-01-23 PROCEDURE — 82950 GLUCOSE TEST: CPT | Performed by: ADVANCED PRACTICE MIDWIFE

## 2023-01-23 PROCEDURE — 86780 TREPONEMA PALLIDUM: CPT | Performed by: ADVANCED PRACTICE MIDWIFE

## 2023-01-23 RX ORDER — LORATADINE 10 MG/1
10 TABLET ORAL DAILY
Status: ON HOLD | COMMUNITY
End: 2023-05-01

## 2023-01-23 NOTE — PROGRESS NOTES
Here with Meera Ocasio for a routine prenatal visit at 27w0d. Reports normal fetal movements. Denies PTL including, regular painful contractions, bleeding, leaking or changes in fetal movement. Fetal movement monitoring disscused. Encouraged BID kick counts and taught how to perform these. Still looking into CBE options. Unsure at this time of peds provider. Breastfeeding planned. Has already started to leaking colostrum, reassurance provided. Has 28 wk growth ultrasond set up in 2 weeks. Feels like her moods are more stable than at her last visit. Feels more like herself. EPDS= 8, 0 to #10. ALL=2. Reviewed  labor precautions, warning signs and when/how to call the on-call CNM. Completing GCT, hgb, RPR today. She is taking oral iron. Recommended Tdap for fetal protection of pertussis. Pt declines today but may accept next week.  NV: plan to offer Tdap.

## 2023-01-24 LAB — T PALLIDUM AB SER QL: NONREACTIVE

## 2023-01-24 ASSESSMENT — PATIENT HEALTH QUESTIONNAIRE - PHQ9: 5. POOR APPETITE OR OVEREATING: NOT AT ALL

## 2023-01-24 ASSESSMENT — ANXIETY QUESTIONNAIRES
5. BEING SO RESTLESS THAT IT IS HARD TO SIT STILL: NOT AT ALL
GAD7 TOTAL SCORE: 2
3. WORRYING TOO MUCH ABOUT DIFFERENT THINGS: SEVERAL DAYS
2. NOT BEING ABLE TO STOP OR CONTROL WORRYING: NOT AT ALL
6. BECOMING EASILY ANNOYED OR IRRITABLE: NOT AT ALL
IF YOU CHECKED OFF ANY PROBLEMS ON THIS QUESTIONNAIRE, HOW DIFFICULT HAVE THESE PROBLEMS MADE IT FOR YOU TO DO YOUR WORK, TAKE CARE OF THINGS AT HOME, OR GET ALONG WITH OTHER PEOPLE: NOT DIFFICULT AT ALL
GAD7 TOTAL SCORE: 2
1. FEELING NERVOUS, ANXIOUS, OR ON EDGE: SEVERAL DAYS
7. FEELING AFRAID AS IF SOMETHING AWFUL MIGHT HAPPEN: NOT AT ALL

## 2023-02-06 ENCOUNTER — OFFICE VISIT (OUTPATIENT)
Dept: MATERNAL FETAL MEDICINE | Facility: HOSPITAL | Age: 23
End: 2023-02-06
Attending: OBSTETRICS & GYNECOLOGY
Payer: COMMERCIAL

## 2023-02-06 ENCOUNTER — ANCILLARY PROCEDURE (OUTPATIENT)
Dept: ULTRASOUND IMAGING | Facility: HOSPITAL | Age: 23
End: 2023-02-06
Attending: OBSTETRICS & GYNECOLOGY
Payer: COMMERCIAL

## 2023-02-06 DIAGNOSIS — O99.212 MATERNAL OBESITY SYNDROME IN SECOND TRIMESTER: ICD-10-CM

## 2023-02-06 DIAGNOSIS — O99.210 OBESITY IN PREGNANCY, ANTEPARTUM: Primary | ICD-10-CM

## 2023-02-06 PROCEDURE — 76816 OB US FOLLOW-UP PER FETUS: CPT

## 2023-02-06 PROCEDURE — 99207 PR NO CHARGE LOS: CPT | Performed by: OBSTETRICS & GYNECOLOGY

## 2023-02-06 PROCEDURE — 76816 OB US FOLLOW-UP PER FETUS: CPT | Mod: 26 | Performed by: OBSTETRICS & GYNECOLOGY

## 2023-02-06 NOTE — NURSING NOTE
Patient reports positive fetal movement, denies contractions, leaking of fluid, or bleeding. SBAR given to ADRIANNE KIMBALL, see their note in Epic.  Romy Uriarte RN

## 2023-02-06 NOTE — PROGRESS NOTES
The patient was seen for an ultrasound in the Maternal-Fetal Medicine Center at the [facility] today.  For a detailed report of the ultrasound examination, please see the ultrasound report which can be found under the imaging tab.    Madeleine Jimenez MD  , OB/GYN  Maternal-Fetal Medicine  [phone] (Pager)

## 2023-02-13 ENCOUNTER — PRENATAL OFFICE VISIT (OUTPATIENT)
Dept: MIDWIFE SERVICES | Facility: CLINIC | Age: 23
End: 2023-02-13
Payer: COMMERCIAL

## 2023-02-13 VITALS
BODY MASS INDEX: 43 KG/M2 | WEIGHT: 222 LBS | HEART RATE: 76 BPM | SYSTOLIC BLOOD PRESSURE: 108 MMHG | DIASTOLIC BLOOD PRESSURE: 62 MMHG

## 2023-02-13 DIAGNOSIS — Z34.02 ENCOUNTER FOR SUPERVISION OF NORMAL FIRST PREGNANCY IN SECOND TRIMESTER: Primary | ICD-10-CM

## 2023-02-13 PROCEDURE — 90471 IMMUNIZATION ADMIN: CPT | Performed by: ADVANCED PRACTICE MIDWIFE

## 2023-02-13 PROCEDURE — 90715 TDAP VACCINE 7 YRS/> IM: CPT | Performed by: ADVANCED PRACTICE MIDWIFE

## 2023-02-13 PROCEDURE — 99207 PR PRENATAL VISIT: CPT | Performed by: ADVANCED PRACTICE MIDWIFE

## 2023-02-13 NOTE — PROGRESS NOTES
Here with Rojelio for a routine prenatal visit at 30w0d. Reports normal fetal movements. Denies regular painful contractions, bleeding, leaking, changes in fetal movement. Continues daily kick counts.   Reviewed 28 week labs- passed 1 hr GCT, Hgb 12.6  Questions about taking iron- encouraged continuing to take iron to maintain hgb, may decrease to every other day if desires. Reports some constipation but thinks it's resolved now- reviewed stool softener PRN.   Accepts Tdap today.   Briefly discussed recommended induction timing of 39.0-39.6, states would ideally like to avoid induction if possible until due date.   Has US scheduled on 3/6 (33w0d) at [facility] for assessment of fetal growth. Reviewed recommended weekly  testing beginning at 34 weeks secondary to BMI, educated on BPP and NST.   Pregnancy checklist addressed.   Reviewed  labor precautions, warning signs and when/how to call the on-call CNM.   RTC 2 weeks.     RIKA Royal    I was present with the student who participated in the service and the documentation of the note. I have verified the history and personally performed the physical exam and medical decision making. I agree with the assessment and plan as documented in the note.     BLAYNE Schaefer, ALESSANDRA

## 2023-02-19 ENCOUNTER — HOSPITAL ENCOUNTER (OUTPATIENT)
Facility: HOSPITAL | Age: 23
End: 2023-02-19
Admitting: ADVANCED PRACTICE MIDWIFE
Payer: COMMERCIAL

## 2023-02-19 ENCOUNTER — HOSPITAL ENCOUNTER (OUTPATIENT)
Facility: HOSPITAL | Age: 23
Discharge: HOME | End: 2023-02-19
Attending: ADVANCED PRACTICE MIDWIFE | Admitting: ADVANCED PRACTICE MIDWIFE
Payer: COMMERCIAL

## 2023-02-19 ENCOUNTER — TELEPHONE (OUTPATIENT)
Dept: MIDWIFE SERVICES | Facility: CLINIC | Age: 23
End: 2023-02-19

## 2023-02-19 VITALS — TEMPERATURE: 98.3 F | HEART RATE: 71 BPM | SYSTOLIC BLOOD PRESSURE: 104 MMHG | DIASTOLIC BLOOD PRESSURE: 53 MMHG

## 2023-02-19 PROCEDURE — 59025 FETAL NON-STRESS TEST: CPT | Mod: 26 | Performed by: ADVANCED PRACTICE MIDWIFE

## 2023-02-19 PROCEDURE — 99212 OFFICE O/P EST SF 10 MIN: CPT | Mod: 25 | Performed by: ADVANCED PRACTICE MIDWIFE

## 2023-02-19 ASSESSMENT — ACTIVITIES OF DAILY LIVING (ADL): ADLS_ACUITY_SCORE: 31

## 2023-02-19 NOTE — PROGRESS NOTES
OUTPATIENT TRIAGE NOTE:    Patient Name:  Amanda Miller  :      2000  MRN:      [id number]        SUBJECTIVE:  Amanda Miller is a 22 year old  at 30.6 weeks, with an EDC of 23 who presented to [facility] ER for pain per earlier phone note and was sent to [facility] for evaluation of fetal well being. Denies leaking of fluid, bleeding, or changes in fetal movement. Notes earlier pain has now resolved.      OBJECTIVE:  Vital signs: /53 (BP Location: Right arm, Patient Position: Semi-Guerrero's, Cuff Size: Adult Regular)   Pulse 71   Temp 98.3  F (36.8  C) (Oral)   LMP 2022   FHR: Baseline 135, moderate variability, + accels, - decels.  Uterine contractions: none    Physical Exam: no exam, managed remotely.    Temp:  [98.3  F (36.8  C)] 98.3  F (36.8  C)  Pulse:  [71] 71  BP: (104)/(53) 104/53    Results:  REACTIVE NST    ASSESSMENT:   @ 30w6d here for evaluation of fetal well being.  REACTIVE NST.     PLAN:   - Discharge to home undelivered. Reviewed warning signs including decreased fetal movement, leaking of fluid, vaginal bleeding, or signs of labor. Reviewed how to contact on-call CNM. Follow-up in clinic with CNM as scheduled or sooner as needed. All questions answered. Agrees with plan.       Provider: BLAYNE Domingo, ALESSANDRA    Total time spent on the date of this encounter doing: chart review, review of test results, patient visit, the physical exam, education, counseling, developing this plan of care, and documenting = 10 minutes.    2023 4:54 AM

## 2023-02-19 NOTE — PROGRESS NOTES
Amanda pages this CNM at 0336, patient looked up in Epic and returned call from [phone]; reports sudden onset severe back pain and nausea.  States she was sleeping without pain until 5 minutes ago.  Just vomited between talking to answering service and awaiting return call.  Can hear her moaning and restless with pain, some difficulty talking throughout call.  Reports pain not resolved with recent emesis, is constant severe pain in  mid back.  Lives near [facility].  Advised proceeding to ED now.  Support person on phone and verbalized understanding of instructions and intent to bring her to nearby ER ASAP.    BLAYNE Sy, CNM  2/19/23.   [id number]

## 2023-02-19 NOTE — TELEPHONE ENCOUNTER
TC:  Amanda price this CNM at 0336, patient looked up in Epic and returned call from [phone]; reports sudden onset severe back pain and nausea.  States she was sleeping without pain until 5 minutes ago.  Just vomited between talking to answering service and awaiting return call.  Can hear her moaning and restless with pain, some difficulty talking throughout call.  Reports pain not resolved with recent emesis, is constant severe pain in  mid back.  Lives near [facility].  Advised proceeding to ED now.  Support person on phone and verbalized understanding of instructions and intent to bring her to nearby ER ASAP.     BLAYNE Sy, CNM  2/19/23.   0355

## 2023-02-19 NOTE — PROGRESS NOTES
Semaj APARICIO called at 0455 to update - patient has no pain now, no leaking or bleeding, VSS, Category I tracing, +FM. Verbal order received to discharge home.

## 2023-02-21 DIAGNOSIS — Z34.02 ENCOUNTER FOR SUPERVISION OF NORMAL FIRST PREGNANCY IN SECOND TRIMESTER: ICD-10-CM

## 2023-02-28 NOTE — PROGRESS NOTES
Amanda presents to [facility] clinic for a routine prenatal visit at 32w2d. Feeling well. Some breast tenderness. Frequent urination. Very thirsty lately.   Recently seen in ER (23) for back pain and nausea. No complaint of pain today. No Recurrences since ED visit.   Reports normal fetal movements. Discussed [facility]. Denies regular painful contractions, bleeding, leaking, changes in fetal movement.      Questions about refill for iron, reports of her back pain and vomiting that sent her to ER--no episodes since.    32 week pregnancy checklist addressed.   CBE  Breastfeeding and pumping   Peds provider - F peds   PP supports -  will be off work for 1-2 wks for support. Pt has family near by for help as well.     Reviewed our recommendation that she take an iron supplement daily to boost her iron stores prior to birth   Pt  is currently supplementing daily; discussed strategies to manage constipation.   Refill needed today for Iron.     Reviewed  labor precautions, warning signs and when/how to call the on-call CNM.

## 2023-03-01 ENCOUNTER — PRENATAL OFFICE VISIT (OUTPATIENT)
Dept: MIDWIFE SERVICES | Facility: CLINIC | Age: 23
End: 2023-03-01
Payer: COMMERCIAL

## 2023-03-01 VITALS
HEIGHT: 60 IN | DIASTOLIC BLOOD PRESSURE: 68 MMHG | HEART RATE: 93 BPM | WEIGHT: 225 LBS | BODY MASS INDEX: 44.17 KG/M2 | OXYGEN SATURATION: 97 % | SYSTOLIC BLOOD PRESSURE: 108 MMHG

## 2023-03-01 DIAGNOSIS — Z34.90 NORMAL INTRAUTERINE PREGNANCY, ANTEPARTUM: Primary | ICD-10-CM

## 2023-03-01 PROCEDURE — 99207 PR PRENATAL VISIT: CPT | Performed by: MIDWIFE

## 2023-03-01 RX ORDER — CALCIUM CARBONATE 500 MG/1
1 TABLET, CHEWABLE ORAL 2 TIMES DAILY
Status: ON HOLD | COMMUNITY
End: 2023-05-01

## 2023-03-06 ENCOUNTER — ANCILLARY PROCEDURE (OUTPATIENT)
Dept: ULTRASOUND IMAGING | Facility: HOSPITAL | Age: 23
End: 2023-03-06
Attending: OBSTETRICS & GYNECOLOGY
Payer: COMMERCIAL

## 2023-03-06 ENCOUNTER — OFFICE VISIT (OUTPATIENT)
Dept: MATERNAL FETAL MEDICINE | Facility: HOSPITAL | Age: 23
End: 2023-03-06
Attending: OBSTETRICS & GYNECOLOGY
Payer: COMMERCIAL

## 2023-03-06 DIAGNOSIS — O99.213 OBESITY COMPLICATING PREGNANCY, THIRD TRIMESTER: ICD-10-CM

## 2023-03-06 DIAGNOSIS — Z03.74 FETAL GROWTH PROBLEM SUSPECTED BUT NOT FOUND: ICD-10-CM

## 2023-03-06 PROCEDURE — 76816 OB US FOLLOW-UP PER FETUS: CPT | Mod: 26 | Performed by: OBSTETRICS & GYNECOLOGY

## 2023-03-06 PROCEDURE — 76816 OB US FOLLOW-UP PER FETUS: CPT

## 2023-03-06 NOTE — PROGRESS NOTES
Please see the imaging tab for details of the ultrasound performed today.    Narcisa Taylor MD  Specialist in Maternal-Fetal Medicine

## 2023-03-06 NOTE — NURSING NOTE
Amanda Miller is a  at 33w0d who presents to [facility] for follow up growth ultrasound. Pt reports positive fetal movement. Pt denies bldg/lof/change in discharge, contractions, headache, vision changes, chest pain/SOB or edema. SBAR given to Dr. Taylor, see note in Epic.

## 2023-03-13 ENCOUNTER — OFFICE VISIT (OUTPATIENT)
Dept: MATERNAL FETAL MEDICINE | Facility: HOSPITAL | Age: 23
End: 2023-03-13
Attending: OBSTETRICS & GYNECOLOGY
Payer: COMMERCIAL

## 2023-03-13 ENCOUNTER — ANCILLARY PROCEDURE (OUTPATIENT)
Dept: ULTRASOUND IMAGING | Facility: HOSPITAL | Age: 23
End: 2023-03-13
Attending: OBSTETRICS & GYNECOLOGY
Payer: COMMERCIAL

## 2023-03-13 ENCOUNTER — PRENATAL OFFICE VISIT (OUTPATIENT)
Dept: MIDWIFE SERVICES | Facility: CLINIC | Age: 23
End: 2023-03-13
Payer: COMMERCIAL

## 2023-03-13 VITALS
HEART RATE: 76 BPM | DIASTOLIC BLOOD PRESSURE: 70 MMHG | WEIGHT: 230 LBS | SYSTOLIC BLOOD PRESSURE: 118 MMHG | BODY MASS INDEX: 44.55 KG/M2

## 2023-03-13 DIAGNOSIS — Z91.89 AT RISK FOR COMPLICATION OF PREGNANCY: ICD-10-CM

## 2023-03-13 DIAGNOSIS — F41.9 ANXIETY: ICD-10-CM

## 2023-03-13 DIAGNOSIS — Z34.03 ENCOUNTER FOR SUPERVISION OF NORMAL FIRST PREGNANCY IN THIRD TRIMESTER: Primary | ICD-10-CM

## 2023-03-13 DIAGNOSIS — O99.213 OBESITY COMPLICATING PREGNANCY, THIRD TRIMESTER: Primary | ICD-10-CM

## 2023-03-13 DIAGNOSIS — F32.A DEPRESSION, UNSPECIFIED DEPRESSION TYPE: ICD-10-CM

## 2023-03-13 PROCEDURE — 99207 PR PRENATAL VISIT: CPT | Performed by: ADVANCED PRACTICE MIDWIFE

## 2023-03-13 PROCEDURE — 76819 FETAL BIOPHYS PROFIL W/O NST: CPT | Mod: 26 | Performed by: OBSTETRICS & GYNECOLOGY

## 2023-03-13 PROCEDURE — 76819 FETAL BIOPHYS PROFIL W/O NST: CPT

## 2023-03-13 PROCEDURE — 99207 PR NO CHARGE LOS: CPT | Performed by: OBSTETRICS & GYNECOLOGY

## 2023-03-13 NOTE — NURSING NOTE
Patient reports positive fetal movement, no pain, no contractions, leaking of fluid, or bleeding.  Patient denies headache, visual changes, nausea/vomiting, epigastric pain related to preeclampsia.  Education provided to patient on biophysical profile.  SBAR given to ADRIANNE KIMBALL, see their note in Epic.

## 2023-03-13 NOTE — PROGRESS NOTES
The patient was seen for an ultrasound in the Maternal-Fetal Medicine Center at the [facility] today.  For a detailed report of the ultrasound examination, please see the ultrasound report which can be found under the imaging tab.    If you have questions regarding today's evaluation or if we can be of further service, please contact the Maternal-Fetal Medicine Center.    Madeleine Jimenez MD  , OB/GYN  Maternal-Fetal Medicine  [phone] (Pager)

## 2023-03-14 NOTE — PROGRESS NOTES
Next visit: GBS RV culture and hemoglobin.  Amanda presents today with her  Rojelio.  Overall, feeling well.  She had a disagreement with her mother which is left her feeling somewhat depressed and sad.  She declines counseling and therapy at this time.  Additionally, she would like to await commencing restarting not Lexapro until postpartum.  She understands it can take 6 to 8 weeks for a therapeutic response.  EPDS today: , 0 to #10.  Baby is active, and she denies regular uterine contractions, loss of fluid or vaginal bleeding.  MFM US: BPP , cephalic presentation.  Weekly  surveillance recommended due to pregravid BMI greater than 40.   labor precautions and danger signs and symptoms reviewed.  All questions answered.  Encouraged daily fetal movement counting and to call or return to clinic with any questions, concerns, or as needed.

## 2023-03-20 ENCOUNTER — OFFICE VISIT (OUTPATIENT)
Dept: MATERNAL FETAL MEDICINE | Facility: HOSPITAL | Age: 23
End: 2023-03-20
Attending: OBSTETRICS & GYNECOLOGY
Payer: COMMERCIAL

## 2023-03-20 ENCOUNTER — ANCILLARY PROCEDURE (OUTPATIENT)
Dept: ULTRASOUND IMAGING | Facility: HOSPITAL | Age: 23
End: 2023-03-20
Attending: OBSTETRICS & GYNECOLOGY
Payer: COMMERCIAL

## 2023-03-20 DIAGNOSIS — O99.213 OBESITY COMPLICATING PREGNANCY, THIRD TRIMESTER: Primary | ICD-10-CM

## 2023-03-20 PROCEDURE — 99207 PR NO CHARGE LOS: CPT | Performed by: OBSTETRICS & GYNECOLOGY

## 2023-03-20 PROCEDURE — 76819 FETAL BIOPHYS PROFIL W/O NST: CPT | Mod: 26 | Performed by: OBSTETRICS & GYNECOLOGY

## 2023-03-20 PROCEDURE — 76819 FETAL BIOPHYS PROFIL W/O NST: CPT

## 2023-03-20 NOTE — PROGRESS NOTES
"Please see \"Imaging\" tab under Chart Review for full details.    Patricia Cuenca MD  Maternal Fetal Medicine    "

## 2023-03-21 ENCOUNTER — DOCUMENTATION ONLY (OUTPATIENT)
Dept: MIDWIFE SERVICES | Facility: CLINIC | Age: 23
End: 2023-03-21
Payer: COMMERCIAL

## 2023-03-27 ENCOUNTER — ANCILLARY PROCEDURE (OUTPATIENT)
Dept: ULTRASOUND IMAGING | Facility: HOSPITAL | Age: 23
End: 2023-03-27
Attending: OBSTETRICS & GYNECOLOGY
Payer: COMMERCIAL

## 2023-03-27 ENCOUNTER — OFFICE VISIT (OUTPATIENT)
Dept: MATERNAL FETAL MEDICINE | Facility: HOSPITAL | Age: 23
End: 2023-03-27
Attending: OBSTETRICS & GYNECOLOGY
Payer: COMMERCIAL

## 2023-03-27 DIAGNOSIS — O99.213 MATERNAL OBESITY SYNDROME IN THIRD TRIMESTER: Primary | ICD-10-CM

## 2023-03-27 PROCEDURE — 76819 FETAL BIOPHYS PROFIL W/O NST: CPT | Mod: 26 | Performed by: OBSTETRICS & GYNECOLOGY

## 2023-03-27 PROCEDURE — 99213 OFFICE O/P EST LOW 20 MIN: CPT | Mod: 25 | Performed by: OBSTETRICS & GYNECOLOGY

## 2023-03-27 PROCEDURE — 76816 OB US FOLLOW-UP PER FETUS: CPT | Mod: 26 | Performed by: OBSTETRICS & GYNECOLOGY

## 2023-03-27 PROCEDURE — 76816 OB US FOLLOW-UP PER FETUS: CPT

## 2023-03-27 PROCEDURE — 76819 FETAL BIOPHYS PROFIL W/O NST: CPT

## 2023-03-27 NOTE — PROGRESS NOTES
[facility] Clinic Visit    Amanda presents to [facility] clinic for ultrasound and recommendations. The following problems were addressed:    Maternal obesity third trimester    Tests Reviewed: prior S  Tests Ordered: BPPs  Unique Records reviewed: na    Impression:  1) Britt intrauterine pregnancy at 36w 0d gestational age.  2) None of the anomalies commonly detected by ultrasound were evident in the limited fetal anatomic survey as described above, anatomy limited by gestational age and  fetal lie.  3) Growth parameters and estimated fetal weight were consistent with established dates.  4) The amniotic fluid volume appeared normal.  5) The BPP is 8/8.    Plan:  Thank-you for referring your patient to assess fetal growth.     I discussed the findings on today's ultrasound with the patient. We will continue with weekly BPP until delivery. No further assessment of fetal growth is recommended.     Return to primary provider for continued prenatal care.     If you have questions regarding today's evaluation or if we can be of further service, please contact the Maternal-Fetal Medicine Center.     **Fetal anomalies may be present but not detected**    Patricia Cuenca MD  Maternal Fetal Medicine    Total Time: 9 minutes spent on the date of the encounter doing chart review, history and exam, documentation and further activities as noted above.

## 2023-03-30 ENCOUNTER — PRENATAL OFFICE VISIT (OUTPATIENT)
Dept: MIDWIFE SERVICES | Facility: CLINIC | Age: 23
End: 2023-03-30
Payer: COMMERCIAL

## 2023-03-30 VITALS
SYSTOLIC BLOOD PRESSURE: 112 MMHG | BODY MASS INDEX: 44.55 KG/M2 | HEART RATE: 76 BPM | DIASTOLIC BLOOD PRESSURE: 64 MMHG | WEIGHT: 230 LBS

## 2023-03-30 DIAGNOSIS — Z34.03 ENCOUNTER FOR SUPERVISION OF NORMAL FIRST PREGNANCY IN THIRD TRIMESTER: Primary | ICD-10-CM

## 2023-03-30 DIAGNOSIS — Z91.89 AT RISK FOR COMPLICATION OF PREGNANCY: ICD-10-CM

## 2023-03-30 DIAGNOSIS — Z91.89 AT RISK FOR VENOUS THROMBOEMBOLISM (VTE): ICD-10-CM

## 2023-03-30 LAB — HGB BLD-MCNC: 13.6 G/DL (ref 11.7–15.7)

## 2023-03-30 PROCEDURE — 36415 COLL VENOUS BLD VENIPUNCTURE: CPT | Performed by: ADVANCED PRACTICE MIDWIFE

## 2023-03-30 PROCEDURE — 99207 PR PRENATAL VISIT: CPT | Performed by: ADVANCED PRACTICE MIDWIFE

## 2023-03-30 PROCEDURE — 85018 HEMOGLOBIN: CPT | Performed by: ADVANCED PRACTICE MIDWIFE

## 2023-03-30 PROCEDURE — 87653 STREP B DNA AMP PROBE: CPT | Performed by: ADVANCED PRACTICE MIDWIFE

## 2023-03-31 LAB — GP B STREP DNA SPEC QL NAA+PROBE: POSITIVE

## 2023-04-03 ENCOUNTER — ANCILLARY PROCEDURE (OUTPATIENT)
Dept: ULTRASOUND IMAGING | Facility: HOSPITAL | Age: 23
End: 2023-04-03
Attending: OBSTETRICS & GYNECOLOGY
Payer: COMMERCIAL

## 2023-04-03 ENCOUNTER — OFFICE VISIT (OUTPATIENT)
Dept: MATERNAL FETAL MEDICINE | Facility: HOSPITAL | Age: 23
End: 2023-04-03
Attending: OBSTETRICS & GYNECOLOGY
Payer: COMMERCIAL

## 2023-04-03 DIAGNOSIS — O99.213 MATERNAL OBESITY SYNDROME IN THIRD TRIMESTER: ICD-10-CM

## 2023-04-03 DIAGNOSIS — O99.213 MATERNAL OBESITY SYNDROME IN THIRD TRIMESTER: Primary | ICD-10-CM

## 2023-04-03 DIAGNOSIS — E66.01 OBESITY, CLASS III, BMI 40-49.9 (MORBID OBESITY) (H): ICD-10-CM

## 2023-04-03 PROCEDURE — 76819 FETAL BIOPHYS PROFIL W/O NST: CPT | Mod: 26 | Performed by: OBSTETRICS & GYNECOLOGY

## 2023-04-03 PROCEDURE — 99207 PR NO CHARGE LOS: CPT | Performed by: OBSTETRICS & GYNECOLOGY

## 2023-04-03 PROCEDURE — 76819 FETAL BIOPHYS PROFIL W/O NST: CPT

## 2023-04-03 NOTE — NURSING NOTE
Patient reports active fetal movement, denies pain, regular contractions, leaking of fluid, or bleeding. Patient denies headache, visual changes, nausea/vomiting, epigastric pain related to preeclampsia. SBAR given to ADRIANNE KIMBALL, see their note in Epic.

## 2023-04-03 NOTE — PROGRESS NOTES
Please refer to ultrasound report under 'Imaging' Studies of 'Chart Review' tabs.    Reinaldo Carlson M.D.

## 2023-04-04 NOTE — PROGRESS NOTES
Amanda presents to the clinic with her  Rojelio.  She is feeling well!  They are eager for baby's arrival.  Next visit: Please explore recommendation for IOL at 39+0 weeks due to pregravid BMI of > 40.  Patient is undergoing weekly BPP's with MFM due to pregravid BMI.  Last visit on 3/27/2023 revealed cephalic presentation and EFW in the 20th percentile.  No further fetal growth ultrasounds have been recommended, although weekly BPP's will continue until baby's birth, scheduled.  GBS RV culture and hemoglobin obtained today.  Baby is active, and she denies regular uterine contractions, loss of fluid, vaginal bleeding, headaches, visual disturbances/scotomata or right upper quadrant abdominal pain.  Total weight gain this pregnancy: 4 pounds.   labor precautions and danger signs and symptoms reviewed.  All questions answered.  Encouraged daily fetal movement counting and to call or return to clinic with any questions, concerns, or as needed.

## 2023-04-06 ENCOUNTER — PRENATAL OFFICE VISIT (OUTPATIENT)
Dept: MIDWIFE SERVICES | Facility: CLINIC | Age: 23
End: 2023-04-06
Payer: COMMERCIAL

## 2023-04-06 VITALS — DIASTOLIC BLOOD PRESSURE: 64 MMHG | SYSTOLIC BLOOD PRESSURE: 108 MMHG | WEIGHT: 233 LBS | BODY MASS INDEX: 45.13 KG/M2

## 2023-04-06 DIAGNOSIS — Z34.03 ENCOUNTER FOR SUPERVISION OF NORMAL FIRST PREGNANCY IN THIRD TRIMESTER: Primary | ICD-10-CM

## 2023-04-06 DIAGNOSIS — B95.1 GROUP B STREPTOCOCCAL INFECTION IN PREGNANCY: ICD-10-CM

## 2023-04-06 DIAGNOSIS — O98.819 GROUP B STREPTOCOCCAL INFECTION IN PREGNANCY: ICD-10-CM

## 2023-04-06 PROCEDURE — 99207 PR PRENATAL VISIT: CPT | Performed by: ADVANCED PRACTICE MIDWIFE

## 2023-04-06 NOTE — PROGRESS NOTES
Amanda presents with RiaAmarjit for a routine PNV at 37w 3d.  Feeling overall well.  Discussed:  1.) Experiencing significant sacral discomfort.  Standing and resting on side are the only things that help, so primarily only stands during the day and rests on side at night.  Has birthing ball at home, but this also worsens pain.  Warm showers and heat warmer in car helpful.  Advised warm pads, ice packs, icyhot.  Offered PT and declines for now s/t close to birth.  2.) GBS positive, accepting of prophylactic antibiotics.    Reviewed maternal growth chart.  RTC 1 week, sooner as needed.  Has CNM numbers and aware to call with DFM, LOF, labor, or any questions or concerns.      BLAYNE Rey CNM    4/6/2023  3:34 PM

## 2023-04-06 NOTE — PATIENT INSTRUCTIONS
"\"We hope you had a positive experience and that you can definitely recommend [facility] to your family and friends. You ll be receiving a survey soon and we look forward to hearing your feedback\".    [facility]  - Contact information:  Appointment line and to get a hold of CNM in clinic Monday-Friday 8 am - 5 pm:  [phone].  There are some clinics with early start times (1st appointment 7:40 am) and others with evening hours (last appointment 6:20 pm).  Most are typically open from 8 am to 5 pm.    CNM on call answering service: [phone].  Specify your hospital of choice and leave a brief message for CNM;  will then page CNM who is on call at your specified hospital and you should receive a call back with 15 minutes.  Be sure that your ringer is audible and that you can accept blocked calls so that we can get back in touch with you! This number should be reserved for urgent needs if during the day, before 8 am, after 5 pm, weekends, holidays.    Contact the on-call CNM with warning signs, such as:  vaginal bleeding   Vaginal discharge and itching or pain and burning during urination  Leg/calf pain or swelling on one side  severe abdominal pain  nausea and vomiting more than 4-5 times a day, or if you are unable to keep anything down  fever more than 100.4 degrees F.     Proficiencyhart  After each of your visits you are welcome to check [employer]or for your visit summary including education and links to information relevant to your pregnancy and/or well woman care.   Find the \"Visits\" tab at the top of the page, you will see a list of recent visits and for each visit a for link for \"View After Visit Summary.\" View of your After Visit Summary will allow you to read our recommendations from your visit, review any education provided, and link to websites with useful information.   If you have any questions or difficulty navigating Feedback-Machine, please feel free to " "contact us and we will do our best to direct you.  Meet the Midwives from [facility]  You are invited to an informational meet and greet with [facility]'s [facility] Certified Nurse-Midwives. Our free \"Meet the Midwives\" event is a great opportunity to learn about our midwives' philosophy and experience, the hospitals where we can assist with your birth, and answer questions you may have. Partners, friends, and family are welcome to attend. Currently, this is a virtual event.  Date  Last Thursday of every month at 7 pm.    Link to next (live) meeting  [email or web address]  To Join by Telephone (audio only) Call:   [phone] Phone Conference ID: [phone] #    Touring the Maternity Care Center  At this time we are offering a virtual tour of the Maternity Care Centers at both [facility] [place] and [facility]:   [facility] Maternity Care:   [email or web address]--[facility] Maternity Care:   [email or web address]    Scroll to the bottom of this hyperlink if the above link does not work      Postpartum Depression  The first weeks of caring for a  baby are more than a full-time job. Although it is often a happy time, your feelings and moods may not be what you expected. Many women experience  baby blues.  Here are some tips to help you understand when feelings of sadness are normal, and when you should call your health care provider.    What are the baby blues?  As many as 3 of every 4 women will have short periods of mood swings, crying, or feeling cranky or restless during the first weeks after birth. These feelings can be worse when you are tired or anxious. Women who have the baby blues often say they feel like crying but don t know why. Baby blues usually happen in the first or second week " postpartum (after you give birth) and last less than a week. If you are not sleeping, becoming more upset, don t feel like you can take care of your baby, or your sadness lasts 2 weeks or more, call your health care provider.    What is postpartum depression?  About one in every 5 women will develop depression during the first few months postpartum that may be mild, moderate, or severe. Women who have postpartum depression may have some of these symptoms:  Feeling guilty   Not able to enjoy your baby and feeling like you are not bonding with your baby    Not able to sleep, even when the baby is sleeping  Sleeping too much and feeling too tired to get out of bed  Feeling overwhelmed and not able to do what you need to during the day  Not able to concentrate  Don t feel like eating  Feeling like you are not normal or not yourself anymore  Not able to make decisions  Feeling like a failure as a mother  Feeling lonely or all alone  Thinking your baby might be better off without you  If you have any of these symptoms, call your health care provider!    Which symptoms of postpartum depression are dangerous?  Sometimes a woman with postpartum depression will have thoughts of harming herself or her baby. If you find yourself thinking about hurting yourself or your baby, call your health care provider immediately.    MOTHERHOOD: THE EARLY DAYS  You prepare for the birth of your baby for many months during pregnancy, and then the first months at home after your baby is born can be a quiet, gentle time of getting to know this new person who has come to live in your home. But for most women it is not all quiet or sweet. And for some women it is a very hard time.  What Can I Expect in the First Few Months After the Baby Comes?  New mothers and their families face many challenges in the first few months:  Your body and your hormones have to get back to normal.  You and the baby will be learning to breastfeed.  Babies only sleep a  few hours at a time. The entire family will have a hard time getting enough sleep.  You and your family need to learn how to parent this new family member.  If you have a partner, you have to figure out how to stay together as a couple and maybe even start to have sex again.  You may have to figure out how to keep from getting pregnant again right away.  You may need to return to work and find day care.    How Long Will it Take for My Body to Get Back to Normal?  Some changes will occur quickly. Others will not occur as quickly.  Your uterus, cervix, and vagina will all shrink to their nonpregnant size in about 2 weeks. Your vagina may be tender and dry for a few months--especially if you are breastfeeding.  If you had stitches or hemorrhoids, your   bottom   will be sore for 2 weeks or more.  For some women who have problems urinating, it can take several months for you to be able to hold your urine when you cough or sneeze or suddenly  something heavy.  Your breast milk will   come in   2 to 3 days after the birth of your baby. It will take 6 to 8 weeks for you and the baby to get the hang of breastfeeding and find a pattern. During these first weeks, you can have engorged breasts at times and often leak milk.  Your stomach and intestines all have to fall back into place. You may have a lot of gas for a few weeks.  You may be constipated--especially if you are breastfeeding.  Your stretched stomach muscles can recover in a few weeks, but for some women it takes longer--6 months or a year--to recover.  If you had a  delivery, you may have pain or numbness around the incision for 6 months or more.  Losing the weight you gained during pregnancy will probably take 6 months to a year. Have patience! It took 40 weeks to get here. Give yourself 40 weeks to get back.    What Can I Expect When My Hormones Change?  About 75% of all women will get the   blues.   This usually starts about 3 days after the birth  of your baby. You may cry easily and feel very, very tired. A few women become very depressed. If you had a  delivery or your new baby was sick, you are at a higher risk for depression.  Call your health care provider right away if you cannot care for yourself or your baby, if you feel very nervous or worried, if you cannot stop crying, or if you are having thoughts of hurting yourself or your baby.    Taking Care of Yourself  While you are still pregnant:  Talk with your partner and your family about the time ahead. Arrange for someone to help you during the first weeks at home if you can.  Talk with your health care provider about birth control options and make a plan before the baby comes.  If you are worried about how to parent a , take parenting classes. You will learn a lot about how babies act and you will make some friends who are going through the same thing at the same time. Most [facility] have these classes.  Arrange for someone to help with baby care if you can.  After the baby comes:  Ask for help. Let other people do the cooking and cleaning and run the house. Focus on yourself and your baby.  Sleep whenever you can. Try not to be tempted to   get some things done   when the baby sleeps. This is your time to sleep, too.  Drink lots of water. You will need at least 6 big glasses of water everyday to avoid constipation and make enough breast milk. Every time you sit down to breastfeed, have a big glass of water with you to drink while you are nursing.  Eat lots of vegetables and fruit. You will need lots of vitamins and fiber to help your body get back to normal. This will also help you avoid constipation.  Go outside and walk. Babies can go outside even if it is very cold. Fresh air and sunshine will do you both good.  Take sitz baths. Put about 6 inches of warm water in your bathtub and sit in there for 15 minutes 2 to 3 times a day. This will help your   bottom   heal more quickly. It  will also give you 15 minutes of private time!  Talk to other mothers. Join a new parents group. Call Cristiana and go to [facility] meetings if you are breastfeeding.     With your partner:  Keep talking. Share the experience.  Spend time alone. Even a 30-minute walk can be a date.  Start a birth control method. You can get pregnant before you even have a period. It is very important to use birth control if you do not want to get pregnant again right away.  When you have sex, use a lubricant. A lot of lubricant! Take it slow.  The first few months after a baby comes can be a lot like floating in a jar of honey--very sweet and dailey, but very sticky, too. Take time to enjoy the good parts. Remind yourself that this time will pass. Bon voyage!    FOR MORE INFORMATION  For questions about depression during and after pregnancy:  http://www.womenshealth.gov/publications/our-publications/fact-sheet/depression-pregnancy.html   After birth: The first 6 weeks:  http://www.[employer]al Operations/Post-Birth-and-Recovery   Breastfeeding resources:  http://www.[employer] [facility].org/health-info/getting-breastfeeding-off-to-a-good-start/    Preparing for your baby:       Car Seat Clinics:  https://dps.[place].gov/divisions/ots/child-passenger-safety/Pages/car-seat-checks.aspx  [facility]    Free Car Seat Distribution Facilities     By Appt. Address Contact Information (For appointment)      \Yes [employer] [facility]\[address]\[place],\cell Vania Nowak)[email or web address]      Yes [facility]\ [facility]\[place],\cell Carissa Lynch)[email or web address]      Yes [facility]\[address]\[place],\cell Mima Browne)[email or web address]      Immunizations:  http://www.cdc.gov/vaccines/schedules/easy-to-read/child.html    [place] Screening Program  Http://www.[facility]./newbornscreening/  [place]  newborns are tested soon after birth for more than 50 hidden, rare disorders, including hearing loss and critical congenital heart disease (CCHD). This site provides resources and information for families and providers.    When to call:   Appointment line and to get a hold of a midwife in clinic Monday-Friday 8 am - 5 pm:  [phone].  There are some clinics with early start times (1st appointment 7:40 am) and others with evening hours (last appointment 6:20 pm).  Most are typically open from 8 am to 5 pm.    CNM on call answering service: [phone].  Specify your hospital of choice and leave a brief message for a midwife;  will then page a midwife who is on call at your specified hospital and you should receive a call back with 15 minutes.  Be sure that your ringer is audible and that you can accept blocked calls so that we can get back in touch with you! This number should be reserved for urgent needs if during the day, before 8 am, after 5 pm, weekends, holidays.    Contact the on-call CNM with warning signs, such as:  vaginal bleeding   Vaginal discharge and itching or pain and burning during urination  Leg/calf pain or swelling on one side  severe abdominal pain  nausea and vomiting more than 4-5 times a day, or if you are unable to keep anything down  fever more than 100.4 degrees F.     Make plans for transportation and  as needed for when you are going to the hospital.    Ask your health care provider about vaccinations you may need following delivery. By now, you should have received a Tdap immunization to protect against pertussis or whooping cough. Fathers and family members who will be in close contact with the baby should also receive a Tdap shot at least two weeks before the expected birth of the baby if they have not had a Td (tetanus) shot for at least two years.    Your midwife may offer to check your cervix for changes. If you are past your due date, discuss the next  steps leading to delivery with your midwife. If you don't start labor on your own by 41 or 42 weeks, your midwife may recommend giving you medicines to ripen your cervix and start labor.  Induction of labor: http://onlinelibrary.de la rosa.com/store/10.1016/j.jmwh.2008.04.018/asset/j.jmwh.2008.04.018.pdf?v=1&t=ziuu3yvc&l=[email or web address]    Tell your midwife or physician how you plan to feed your baby (breast or bottle), who you have chosen to do pediatric care for your baby, and if you have a boy, whether you have chosen to have him circumcised. You will need a car seat correctly installed in your vehicle to bring your baby home. As you start to set up the nursery at home for your baby, make sure the crib is safe. The mattress needs to fit snugly against the edges of the crib. If you can fit a soda can between the bars, they are too far apart and can allow the baby's head to caught between them.    Learn about infant care and feeding, including information about infant CPR. We recommend that you put your baby to sleep on his or her back to reduce the chance of Sudden Infant Death Syndrome (SIDS). To maintain a healthy environment in which your child can grow, it's best to keep your home smoke-free. By preparing ahead, your transition into parenthood will go smoothly for you and your baby.    Your midwife will want to see you for a checkup 2 to 6 weeks after delivery.      Making Plans for Feeding My Baby    By this point, you probably have read a lot about feeding your baby.  Breastfeed or formula? Each mother s decision is her own and [facility] respects you and your choices. We ve gathered information on both breastfeeding and formula feeding to help with your decision. Talking with your physician or nurse-midwife can also help in your decision.  However you plan to feed your baby, [facility] Maternity Care Centers encourage rooming in with your baby, skin-to-skin contact and feeding your baby  based on his or her cues.    Skin-to-skin contact  Being close to mom helps your baby adjust to life outside of the womb.  It helps your baby regulate their body temperature, heart rate, and breathing.  Your baby will usually be placed skin-to-skin immediately following birth or as soon as possible, if medical intervention is needed.    Rooming-In  Having your baby stay with you in your room is called  rooming-in .  Keeping your baby in your room helps you to learn how to care for your baby by getting to know your baby s cues, body rhythms and sleep cycle.       Cue-based feeding  Cues (signals) are baby s way of telling you what he or she wants.  When you learn your infant s cues, you know how to care for and feed your baby.   Feeding cues are the licking and smacking of lips, bringing their fist to their mouth, and a reflex called  rooting - where baby turns and opens his or her mouth, searching for the breast or bottle.  Crying is a late feeding cue.  Babies can feed frequently, often at least 8 times in 24 hours.  Breastfeeding facts  Breast milk is the best source of nutrition for your baby and is available at birth. In the first couple of days, your milk volume is already starting to increase, though it may not be noticeable. Breastfeed frequently to increase your milk supply. Within three to five days, you will begin to notice larger milk volumes. An increase in breast size, heaviness and firmness are often described as the milk  coming in.  Frequent breastfeeding can help breasts from getting overly firm and painful. You will know the baby is getting enough milk if your baby is having wet and dirty diapers and gaining weight.     If your goal is to exclusively breastfeed, it is important to not use any formula or artificial nipples (including bottles and pacifiers) while your baby is learning to breastfeed.  While it may seem like an  easy  option to give your baby a bottle, formula should only be given if  there is a medical reason for your baby to have it.    Positioning and attachment   Get comfortable.  Use pillows as needed to support your arms and baby.  Hold baby close at the level of your breast, facing you in a tummy to tummy position.  Skin to skin helps with this.  Position the baby with his or her nose by the nipple.  There should be a straight line from baby s ear to shoulder to hips.  Tickle your baby s lips or wait for baby to open mouth wide, bring baby to breast by leading with the chin.  Aim the nipple at the roof of baby s mouth.  A rapid sucking pattern is followed by longer, drawing pattern with occasional swallows heard.  When baby is correctly latched, your nipple and much of the areola are pulled well into baby s mouth.      Returning to work or school  Focus on a good start to breastfeeding.  Many women continue to provide breastmilk for their baby when they return to work or school.  Making plans about where to pump and store milk can make the transition go well.  Talk with other mothers who have also returned to work or school for tips and support.  Your employer s Human Resource department may be a resource as well.     Returning to work or school: (continued)   babies can mean fewer  sick  days for you.  A quality breast pump will also save time and add comfort.  Check with your insurance prior to giving birth for breast pump coverage.  Many insurance companies include a pump within your benefits.  Wait until your baby is at least three weeks old to introduce a bottle for the first time.  Have someone besides you give the bottle.  Breastfeed when you are with your baby. Reserve your bottles of breast milk for when you are away.   Your breasts will need to be  emptied  either by your baby or a pump.  Plan to pump at least twice in an eight hour day.  If you cannot pump at work, continue breastfeeding at home. Any amount of breast milk is worth giving to your baby.    Formula feeding  facts  If you are planning to use formula to feed your baby, you will want to make some preparations ahead of time. Talk to your doctor or nurse-midwife about what type of formula to use. Some are iron-fortified, meaning they have extra iron in them. You will want to purchase formula and bottles before your baby is born to be sure you are ready after you return from the hospital. The [facility] do not provide formula samples to take home.    Be sure to follow formula mixing directions closely. Regular milk in the dairy case at the grocery store should not be given to babies under 1 year old. Baby formula is sold in several forms including:  Ready-to-use. This is the most expensive, but no mixing is necessary.  Concentrated liquid. This is less expensive than ready-to-use and you mix with water.  Powder. This is the least expensive. You mix one level scoop of powdered formula with two ounces of water and stir well.    Most babies need 2.5 ounces of formula per pound of body weight each day. This means an 8-pound baby may drink about 20 ounces of formula a day; however, this is just an estimate. The most important thing is to pay attention to your baby s cues.  If your baby is always fussy, needs more iron or has certain food allergies, your physician may suggest you change your baby s formula to a different kind.     How do I warm my baby s bottles?  You may feed your baby a bottle without warming it first. It is OK for the breast milk or formula to be cool or room temperature. If your baby seems to prefer it warmed, you can put the filled bottle in a container of warm water and let it stand for a few minutes. Check the temperature of the liquid on your skin before feeding it to your baby; to be sure it isn t too hot. Do not heat bottles in the microwave. Microwaves heat food and liquids unevenly, and this can cause hot spots that can burn your baby.    How do I clean and sterilize bottles?  Sterilize  bottles and nipples before you use them for the first time. You can do this by putting them in boiling water for 5 minutes. After that first time, you can wash them in hot and soapy water. Rinse them carefully to be sure there is no soap left on them. You can also wash them in the .      Am I in Labor?  What is labor? Labor is the work that your body does to birth your baby. Your uterus (the womb) contracts(tightens). The contractions(labor pains) push your baby down onto your cervix(the opening ofyour uterus). Thispressure causesyour cervix to open. When your cervix iscompletely open (10 centimeters dilated), you will push your baby through your vagina and out into the world.  What do contractions feel like? When contractionsfirst start, theyusually feellike cramps duringyour period. Sometimesyoufeelpain in your back. Mostoften,contractions feel like muscles pulling painfully in your lower belly. At first, the contractions will probably be 15 to 20 minutes apart.They maybe irregular and will not feel too painful. As labor goes on, the contractionsget stronger,closer together, more consistent, and more painful.  How do I time the contractions? When the contractionsseem to be coming regularly, youshould start to time them.You time your contractions by counting the number of minutes from the start of one contraction to the start of the next contraction.  What should I do during early labor when the contractions start? If it is night andyoucan sleep, do so. If it happensduring the day, there are some things you can do to take care of yourself at home: Walk. If the painsyou are having are reallabor, walking will makethecontractionscome closer together and they will be stronger,but you will be able to cope with them better if you are standing or moving around. If the contractions are early labor ones that come andgo (sometimes called false labor), walkingcan make them go away. Take a shower or bath. This will  help you relax. Eat. Labor is a big event.Your body needs a lot of energy to be effective.Eat whatever you feel like eating. Drink water. Not drinking enough water can cause contractions to not be as effectiveas theyshould be.You need to be well hydrated (drinking enoughwater) to help your body work well during labor. Take a na p. If youfeel tired, lay down on your side and get all the rest you can. It helps to be rested when you go intoactive labor. Do something you enjoy. Spend time with family. Watch a movie. Distraction will help you relax. Get a massage. If your labor is in your back, a strong massage on your lower back may feel very good. Getting a foot massage or having a partner rub your feet can also be very relaxing. Don t panic. You can do this. Your body was made for this. You are strong!  When should I call my health care provider if I think I am in labor? Your contractions have been 5 minutes or less apart for at least an hour. Your contractions are becoming so painful youcannot walk or talk during one. You think your amniotic sac (bag of young) breaks. You may have a big gush of amniotic fluid (water) or just fluid that runs down your legs when you walk or move or change position.  Are there other reasons to call my health care provider? If you are concerned about anything, don t hesitate to call your health care provider.You should definitely call your health care provider or go to the hospital if:  It is 3 weeks or more before your due date, and you are having contractions.  You have vaginal bleeding that is more than your period, soaks your underwear, or runs down your legs.  You have sudden severe pain that does not go away with rest.  Your baby has not movedfor several hours.  You are leaking greenish fluid.    For More Information: http://onlinelibrary.de la rosa.com/doi/10.1111/jmwh.75574/epdf     US Department of Health and Human Services: Signs oflabor,labor stages, and types of  birth  http://womenshealth.gov/pregnancy/childbirth-beyond/labor-birth.html#a      Childbirth and Parenting Education:       Everyday Miracles:   https://www.everyday-miracles.org/    Free Video Series from [facility]: https://nursing.[facility]/academics/specialty-areas/nurse-midwifery/having-baby-prenatal-videos/having-baby-prenatal-and    BILLY parenting center: http://Massage EnvyDavid [facility] CenterBicycle Therapeutics/   (670) 676-BABY  [place]: (education, yoga & wellness) www.Bathrooms.comaAtonometrics  Enlightened Mama: www.enlightenedmama.[employer]   Childbirth collective: (Parent topic nights)  www.childbirthcollective.org/  Hypnobabies:  www.hypnobabiestwincities.[employer]/  Hypnobirthing:  Http://hypnobirthing.[employer]/  The Birth Hour: https://agÃƒÂ¡mi Systems/online-childbirth-class/    APPS and Podcasts:   Homero Powers Nurture    Evidence Based Birth  The Birth Hour (for birth stories)   Birthful   Expectful   The Longest Shortest Time  PregnancyPodcast Lori Rust    Book Recommendations:   Jackie [place]'s Birthing From Within--first few chapters include a new-age tone, you may prefer to skip it and keep going, because there is good stuff later.  This book recommendation covers emotional preparation, but does cover coping with pain, and use of both pharmacological and nonpharmacological methods.    Dr. Costa' The Pregnancy Book and The Birth Book--the pregnancy book goes month-by month      The Birth Partner by Arelis Kelly    Womanly Art of Breastfeeding by La Leche League International   Bestfeeding by Marie Keller--great pictures    Mothering Your Nursing Toddler, by Angy Burgos.   Addresses dealing with so many of the challenging behaviors of a nursing toddler.  How Weaning Happens, by La Leche League.  Discusses weaning at all ages, from medically necessary weaning of an infant, all the way up to age 5 (or older), with why/why not, and strategies.  Very empowering book both for deciding to wean and deciding not to.    American College of  "Nurse-Midwives (ACNM) http://www.midwife.org/; look at the informational handouts at http://www.midwife.org/Share-With-Women     www.mymidwife.org    Mother to Baby (Medication and Herbal guidance in pregnancy): http://www.mothertobaby.org  Toll-Free Hotline: 939.968.2356  LactMed (Medication use while breastfeeding): http://toxnet.nlm.nih.gov/newtoxnet/lactmed.htm    Women's Health.gov:  http://www.womenshealth.gov/a-z-topics/index.html    American pregnancy association - http://americanpregnancy.org    Centering Pregnancy (group prenatal care option): http://centeringhealthcare.org    Information about doulas:  Childbirth collective: http://www.childbirthcollective.org/  Doulas of [place] (DEBBIE):  www.debbie.org  [facility]  project: http://AbsolutDatacitiesdoulaproject.com/     Early Childhood and Family Education (ECFE):  ECFE offers parents hands-on learning experiences that will nourish a lifetime of teachable moments.  http://ecfe.info/ecfe-home/    March of Dimes www.Exterity.PayrollHero     FDA - Nutrition  www.mypyramid.gov  Under \"For Consumers,\" click on \"pregnant and breastfeeding women.\"      Centers for Disease Control and Prevention (CDC) - Vaccines : http://www.cdc.gov/vaccines/       When researching information on the web, question the validity of websites.  The domains .gov, .edu and.org tend to be more reliable information.  If there are a lot of advertisements, be cautious of the information provided. Stay away from blogs and chat rooms please!     [facility] Breastfeeding Support    Early Childhood Family Education [place] [address] provides a free, drop-in class/breastfeeding support group, facilitated by a lactation consultant and .  During the group you can connect with other parents, weigh your baby, ask questions about feeding and baby development, and more.  You do not need to register.  Fall in-person meetings will begin on September 12, are for parents of babies from birth to 9 " "months, and will meet on Monday evenings from 6 - 7:15 pm in [facility], which is at [address] [facility].  [place] [address] also offers virtual group meetings with a lactation consultant/.  These take place on , from 11:30 am - 12:30 pm for parents of newborns to 3-month-olds, and from 4:15 - 5:15 for parents of 3 - 9 - month olds.  To get the meeting link contact Kika Yuan at [phone].    [facility] offers a free, drop-in breastfeeding support group facilitated by KRISTIAN Jones.   at [facility] [address], [place].  A scale is available to check baby weights, if desired.  [facility] also has a variety of new parent classes:  (cost for registration)  https://[employer]/classes/    [facility] Lactation Lounge facilitated by KRISTIAN Rao:  Free, drop-in support group for breastfeeding, with baby scale available if needed.  Meets at [facility], second Tuesday of each month, 10 am - 12 pm.  Text [phone] for info.    [facility] Support Group,  at 10:30 am   Run by KRISTIAN Montes De Oca of The Baby Whisperer Lactation Consultants   Go to The Baby Whisperer Lactation Consultants Facebook page, click on \"events\" for link:   Https://www.Open CS.com/events/142476632109049/    [facility] [place] breastfeeding support community:  free, drop-in breastfeeding support facilitated by Certified Lactation Counselors, open  and  from 1 pm - 5 pm.  In [place]:  [facility] [place], [address] [facility]:   guidingstpiotrkota.org    [facility] Milk Hour,  at 2:30 pm    Run by KRISTIAN Gutierrez  Go to [facility] FB page and send message to get link   Https://www.Open CS.com/healthfoundations/    Vishal Gonsales:  http://www.[facility] Special[facility] Hospitalndas.org/    [place] offers a Lactation Lounge every Friday 12pm - 1pm, run by Narcisa" "Vishal Musa Leader.  Sign up via link at readness.com/cbe-lactation   https://www.readness.com/cbe-lactation    [facility] is offering virtual support groups every Monday, 10:30 am - 12 pm, run by RN IBCLC: Https://www.AssetAvenue.com/events/309786198191741/    Culturally-Specific Breastfeeding Support:     For Hmong Families:   The Hmong Breastfeeding Coalition is a wonderful support for [place] Hmong women who are breastfeeding.  They are best found on Facebook.    for Black families:    Chocolate Milk Club:  http://www.[employer]rika Online/chocolate-milk-club/  Email: Yeimi@Application Experts    R.O.S.E. = Reaching our Sisters Everywhere  Http://www.breastfeedingrose.org/    Club Mom breastfeeding support for Black mothers:  Contact Manisha Aceves  Phone: [phone]   Email:  [email or web address].     Ana Ro  Phone: [phone]   Email:  [email or web address].    Club Dad parent support for Black fathers:   Contact Delonte Ag   Phone: [phone]   Email:  Kali@[facility]    For Native/Indigenous Families:    https://www.AssetAvenue.com/groups/nitamising.gimashkikinaan   Virtual Breastfeeding Support:    During this time of isolation, breastfeeding families need even more community!  Here are some area organizations offering virtual support groups for breastfeeding:    Lat Cafe Support Group, Tuesdays at 10:30 am   Run by KRISTIAN Montes De Oca of The Baby Whisperer Lactation Consultants   Go to The Baby Whisperer Lactation Consultants Facebook page and click on \"events\" for link   https://www.facebook.com/events/207669524264732/  [facility] Milk Hour, Thursdays at 2:30 pm    Run by KRISTIAN Gutierrez   Go to [facility] FB page and send message to get link   https://www.AssetAvenue.com/healthfoundations/  [facility]ec[facility] holding virtual " meetings the first Tuesday of each month, 8-9 pm, and the   Third Saturday, 10 - 11 am.  Go to La Leche League of [place] and [place] FB page; message to get link https://www.[employer]en [employer].com/LLAdriannafGMark/?hc_location=ufi  [place] offers a Lactation Lounge every Friday 12pm - 1pm, run by Vishal Cao Leader   Sign up via link at [employer]/cbe-lactation   https://www.Taiwan Yuand[employer]/cbe-lactation  [facility] is offering virtual support groups every Monday, 10:30 am - 12 pm, run by nurse KRISTIAN   Https://www.facebook.com/events/641292544071757/    Prenatal Breastfeeding Classes:      [facility] is offering virtual breastfeeding and  care classes:  https://www.Taiwan Yuandong Group/education-workshops  BirthEd childbirth and breastfeeding education offering virtual prenatal breastfeeding classes  https://www.birthedmn.com/workshops

## 2023-04-10 ENCOUNTER — OFFICE VISIT (OUTPATIENT)
Dept: MATERNAL FETAL MEDICINE | Facility: HOSPITAL | Age: 23
End: 2023-04-10
Attending: OBSTETRICS & GYNECOLOGY
Payer: COMMERCIAL

## 2023-04-10 ENCOUNTER — ANCILLARY PROCEDURE (OUTPATIENT)
Dept: ULTRASOUND IMAGING | Facility: HOSPITAL | Age: 23
End: 2023-04-10
Attending: OBSTETRICS & GYNECOLOGY
Payer: COMMERCIAL

## 2023-04-10 DIAGNOSIS — O99.213 MATERNAL OBESITY SYNDROME IN THIRD TRIMESTER: ICD-10-CM

## 2023-04-10 DIAGNOSIS — O99.213 MATERNAL OBESITY SYNDROME IN THIRD TRIMESTER: Primary | ICD-10-CM

## 2023-04-10 PROCEDURE — 99207 PR NO CHARGE LOS: CPT | Performed by: OBSTETRICS & GYNECOLOGY

## 2023-04-10 PROCEDURE — 76819 FETAL BIOPHYS PROFIL W/O NST: CPT

## 2023-04-10 PROCEDURE — 76819 FETAL BIOPHYS PROFIL W/O NST: CPT | Mod: 26 | Performed by: OBSTETRICS & GYNECOLOGY

## 2023-04-13 ENCOUNTER — PRENATAL OFFICE VISIT (OUTPATIENT)
Dept: MIDWIFE SERVICES | Facility: CLINIC | Age: 23
End: 2023-04-13
Payer: COMMERCIAL

## 2023-04-13 VITALS — SYSTOLIC BLOOD PRESSURE: 116 MMHG | BODY MASS INDEX: 44.74 KG/M2 | WEIGHT: 231 LBS | DIASTOLIC BLOOD PRESSURE: 68 MMHG

## 2023-04-13 DIAGNOSIS — Z91.89 AT RISK FOR VENOUS THROMBOEMBOLISM (VTE): ICD-10-CM

## 2023-04-13 DIAGNOSIS — Z34.03 ENCOUNTER FOR SUPERVISION OF NORMAL FIRST PREGNANCY IN THIRD TRIMESTER: Primary | ICD-10-CM

## 2023-04-13 DIAGNOSIS — Z91.89 AT RISK FOR COMPLICATION OF PREGNANCY: ICD-10-CM

## 2023-04-13 PROCEDURE — 99207 PR PRENATAL VISIT: CPT | Performed by: ADVANCED PRACTICE MIDWIFE

## 2023-04-13 NOTE — PROGRESS NOTES
Amanda seen for routine prenatal visit at 38w 3d with significant other Rojelio. Over all feeling well, couple is ready to meet their baby!   Amanda continues to have sacral discomfort. Reports she is doing some stretching and gentle massage at home which is somewhat helpful. Educated pt to visit the spinning babies and expecting and empowered web sites for additional stretching and exercise activities that may be helpful for her discomfort.   Pt denies any headache, visual disturbance, right upper gastric pain, vaginal bleeding, or leaking of fluids.   Would like cervical assessment at next visit. Also interested in scheduling induction if baby has not arrived.   39 week CNM prenatal appointment is scheduled along with MFM follow up (due to pregravid BMI 43) and radiology.  Patient is open to SVE next visit and to schedule IOL.    Patient was seen with student, SHAUNA Cruz who was present for learning. I personally assessed, examined and made clinical decisions reflected in the documentation.

## 2023-04-17 ENCOUNTER — OFFICE VISIT (OUTPATIENT)
Dept: MATERNAL FETAL MEDICINE | Facility: HOSPITAL | Age: 23
End: 2023-04-17
Attending: OBSTETRICS & GYNECOLOGY
Payer: COMMERCIAL

## 2023-04-17 ENCOUNTER — ANCILLARY PROCEDURE (OUTPATIENT)
Dept: ULTRASOUND IMAGING | Facility: HOSPITAL | Age: 23
End: 2023-04-17
Attending: OBSTETRICS & GYNECOLOGY
Payer: COMMERCIAL

## 2023-04-17 DIAGNOSIS — O99.213 MATERNAL OBESITY SYNDROME IN THIRD TRIMESTER: ICD-10-CM

## 2023-04-17 DIAGNOSIS — O99.213 MATERNAL OBESITY SYNDROME IN THIRD TRIMESTER: Primary | ICD-10-CM

## 2023-04-17 PROCEDURE — 76819 FETAL BIOPHYS PROFIL W/O NST: CPT

## 2023-04-17 PROCEDURE — 99207 PR NO CHARGE LOS: CPT | Performed by: OBSTETRICS & GYNECOLOGY

## 2023-04-17 PROCEDURE — 76819 FETAL BIOPHYS PROFIL W/O NST: CPT | Mod: 26 | Performed by: OBSTETRICS & GYNECOLOGY

## 2023-04-17 NOTE — NURSING NOTE
"Patient reports positive fetal movement, reports \"tightening a couple nights ago, but stopped\". SBAR given to ADRAINNE KIMBALL, see their note in Epic.      "

## 2023-04-20 ENCOUNTER — PRENATAL OFFICE VISIT (OUTPATIENT)
Dept: MIDWIFE SERVICES | Facility: CLINIC | Age: 23
End: 2023-04-20
Payer: COMMERCIAL

## 2023-04-20 VITALS — BODY MASS INDEX: 45.32 KG/M2 | SYSTOLIC BLOOD PRESSURE: 122 MMHG | WEIGHT: 234 LBS | DIASTOLIC BLOOD PRESSURE: 82 MMHG

## 2023-04-20 DIAGNOSIS — Z91.89 AT RISK FOR VENOUS THROMBOEMBOLISM (VTE): ICD-10-CM

## 2023-04-20 DIAGNOSIS — Z34.03 ENCOUNTER FOR SUPERVISION OF NORMAL FIRST PREGNANCY IN THIRD TRIMESTER: Primary | ICD-10-CM

## 2023-04-20 DIAGNOSIS — Z91.89 AT RISK FOR COMPLICATION OF PREGNANCY: ICD-10-CM

## 2023-04-20 DIAGNOSIS — O99.820 GBS (GROUP B STREPTOCOCCUS CARRIER), +RV CULTURE, CURRENTLY PREGNANT: ICD-10-CM

## 2023-04-20 PROCEDURE — 59025 FETAL NON-STRESS TEST: CPT | Performed by: ADVANCED PRACTICE MIDWIFE

## 2023-04-20 PROCEDURE — 99207 PR PRENATAL VISIT: CPT | Performed by: ADVANCED PRACTICE MIDWIFE

## 2023-04-20 NOTE — Clinical Note
Hello there!  Pitocin IOL scheduled for Saturday, April 22, 2023 at 7:30 AM when you are on-call at [facility].  Thank you!  Romy

## 2023-04-20 NOTE — PROGRESS NOTES
Amanda presents to the clinic today with Rojelio.  Excited for baby's arrival!  We discussed delivery timing between 39+0 and 39+6 weeks in the setting of pregravid BMI of 43.  Patient and her  wish to schedule Pitocin IOL on Saturday, 2023 at 7:30 AM at [facility].  BLAYNE Barnes CNM is on call that day and copied here.  Baby is active, and she denies regular uterine contractions, loss of fluid or vaginal bleeding.   testing occurred on Monday, 2023 BPP 8/8, cephalic presentation.  Today, fetal heart tones 170s with Doppler.  27-minute NST REACTIVE 150, moderate variability, accelerations present, decelerations absent with 24 movements noted by patient.  Some uterine irritability noted.  GBS RV culture POSITIVE and 36-week hemoglobin 13.6 g/dL.  Term labor precautions and danger signs and symptoms reviewed.  All questions answered.  Encouraged daily fetal movement counting and to call or return to clinic with any questions, concerns, or as needed.

## 2023-04-22 ENCOUNTER — HOSPITAL ENCOUNTER (OUTPATIENT)
Facility: HOSPITAL | Age: 23
Discharge: HOME | End: 2023-04-22
Attending: ADVANCED PRACTICE MIDWIFE | Admitting: ADVANCED PRACTICE MIDWIFE
Payer: COMMERCIAL

## 2023-04-22 ENCOUNTER — HOSPITAL ENCOUNTER (INPATIENT)
Facility: HOSPITAL | Age: 23
End: 2023-04-22
Admitting: ADVANCED PRACTICE MIDWIFE
Payer: COMMERCIAL

## 2023-04-22 VITALS
BODY MASS INDEX: 44.45 KG/M2 | TEMPERATURE: 98.2 F | HEART RATE: 61 BPM | HEIGHT: 61 IN | SYSTOLIC BLOOD PRESSURE: 114 MMHG | WEIGHT: 235.4 LBS | RESPIRATION RATE: 16 BRPM | DIASTOLIC BLOOD PRESSURE: 67 MMHG | OXYGEN SATURATION: 97 %

## 2023-04-22 PROBLEM — F41.9 ANXIETY: Status: ACTIVE | Noted: 2021-07-26

## 2023-04-22 PROBLEM — F32.A DEPRESSION: Status: ACTIVE | Noted: 2021-07-26

## 2023-04-22 PROBLEM — Z91.89 AT RISK FOR VENOUS THROMBOEMBOLISM (VTE): Status: ACTIVE | Noted: 2022-09-24

## 2023-04-22 PROBLEM — O61.0 FAILED MEDICAL INDUCTION OF LABOR: Status: ACTIVE | Noted: 2023-04-22

## 2023-04-22 PROBLEM — Z34.90 ENCOUNTER FOR INDUCTION OF LABOR: Status: ACTIVE | Noted: 2023-04-22

## 2023-04-22 LAB
ABO/RH(D): NORMAL
ANTIBODY SCREEN: NEGATIVE
ERYTHROCYTE [DISTWIDTH] IN BLOOD BY AUTOMATED COUNT: 15.1 % (ref 10–15)
HCT VFR BLD AUTO: 39.8 % (ref 35–47)
HGB BLD-MCNC: 13.2 G/DL (ref 11.7–15.7)
HOLD SPECIMEN: NORMAL
MCH RBC QN AUTO: 27.7 PG (ref 26.5–33)
MCHC RBC AUTO-ENTMCNC: 33.2 G/DL (ref 31.5–36.5)
MCV RBC AUTO: 84 FL (ref 78–100)
PLATELET # BLD AUTO: 219 10E3/UL (ref 150–450)
RBC # BLD AUTO: 4.76 10E6/UL (ref 3.8–5.2)
SPECIMEN EXPIRATION DATE: NORMAL
WBC # BLD AUTO: 8.2 10E3/UL (ref 4–11)

## 2023-04-22 PROCEDURE — 250N000011 HC RX IP 250 OP 636: Performed by: ADVANCED PRACTICE MIDWIFE

## 2023-04-22 PROCEDURE — 99234 HOSP IP/OBS SM DT SF/LOW 45: CPT | Performed by: ADVANCED PRACTICE MIDWIFE

## 2023-04-22 PROCEDURE — 250N000009 HC RX 250: Performed by: ADVANCED PRACTICE MIDWIFE

## 2023-04-22 PROCEDURE — 120N000001 HC R&B MED SURG/OB

## 2023-04-22 PROCEDURE — 86850 RBC ANTIBODY SCREEN: CPT | Performed by: ADVANCED PRACTICE MIDWIFE

## 2023-04-22 PROCEDURE — 85018 HEMOGLOBIN: CPT | Performed by: ADVANCED PRACTICE MIDWIFE

## 2023-04-22 PROCEDURE — G0463 HOSPITAL OUTPT CLINIC VISIT: HCPCS

## 2023-04-22 PROCEDURE — 36415 COLL VENOUS BLD VENIPUNCTURE: CPT | Performed by: ADVANCED PRACTICE MIDWIFE

## 2023-04-22 PROCEDURE — 258N000003 HC RX IP 258 OP 636: Performed by: ADVANCED PRACTICE MIDWIFE

## 2023-04-22 RX ORDER — PROCHLORPERAZINE 25 MG
25 SUPPOSITORY, RECTAL RECTAL EVERY 12 HOURS PRN
Status: DISCONTINUED | OUTPATIENT
Start: 2023-04-22 | End: 2023-04-22 | Stop reason: HOSPADM

## 2023-04-22 RX ORDER — FENTANYL CITRATE 50 UG/ML
100 INJECTION, SOLUTION INTRAMUSCULAR; INTRAVENOUS
Status: DISCONTINUED | OUTPATIENT
Start: 2023-04-22 | End: 2023-04-22 | Stop reason: HOSPADM

## 2023-04-22 RX ORDER — KETOROLAC TROMETHAMINE 30 MG/ML
30 INJECTION, SOLUTION INTRAMUSCULAR; INTRAVENOUS
Status: DISCONTINUED | OUTPATIENT
Start: 2023-04-22 | End: 2023-04-22 | Stop reason: HOSPADM

## 2023-04-22 RX ORDER — PENICILLIN G POTASSIUM 5000000 [IU]/1
5 INJECTION, POWDER, FOR SOLUTION INTRAMUSCULAR; INTRAVENOUS ONCE
Status: COMPLETED | OUTPATIENT
Start: 2023-04-22 | End: 2023-04-22

## 2023-04-22 RX ORDER — OXYTOCIN/0.9 % SODIUM CHLORIDE 30/500 ML
1-24 PLASTIC BAG, INJECTION (ML) INTRAVENOUS CONTINUOUS
Status: DISCONTINUED | OUTPATIENT
Start: 2023-04-22 | End: 2023-04-22 | Stop reason: HOSPADM

## 2023-04-22 RX ORDER — SODIUM CHLORIDE, SODIUM LACTATE, POTASSIUM CHLORIDE, CALCIUM CHLORIDE 600; 310; 30; 20 MG/100ML; MG/100ML; MG/100ML; MG/100ML
INJECTION, SOLUTION INTRAVENOUS CONTINUOUS PRN
Status: DISCONTINUED | OUTPATIENT
Start: 2023-04-22 | End: 2023-04-22 | Stop reason: HOSPADM

## 2023-04-22 RX ORDER — CITRIC ACID/SODIUM CITRATE 334-500MG
30 SOLUTION, ORAL ORAL
Status: DISCONTINUED | OUTPATIENT
Start: 2023-04-22 | End: 2023-04-22 | Stop reason: HOSPADM

## 2023-04-22 RX ORDER — LIDOCAINE 40 MG/G
CREAM TOPICAL
Status: DISCONTINUED | OUTPATIENT
Start: 2023-04-22 | End: 2023-04-22 | Stop reason: HOSPADM

## 2023-04-22 RX ORDER — ONDANSETRON 2 MG/ML
4 INJECTION INTRAMUSCULAR; INTRAVENOUS EVERY 6 HOURS PRN
Status: DISCONTINUED | OUTPATIENT
Start: 2023-04-22 | End: 2023-04-22 | Stop reason: HOSPADM

## 2023-04-22 RX ORDER — MISOPROSTOL 200 UG/1
400 TABLET ORAL
Status: DISCONTINUED | OUTPATIENT
Start: 2023-04-22 | End: 2023-04-22 | Stop reason: HOSPADM

## 2023-04-22 RX ORDER — CARBOPROST TROMETHAMINE 250 UG/ML
250 INJECTION, SOLUTION INTRAMUSCULAR
Status: DISCONTINUED | OUTPATIENT
Start: 2023-04-22 | End: 2023-04-22 | Stop reason: HOSPADM

## 2023-04-22 RX ORDER — NALOXONE HYDROCHLORIDE 0.4 MG/ML
0.4 INJECTION, SOLUTION INTRAMUSCULAR; INTRAVENOUS; SUBCUTANEOUS
Status: DISCONTINUED | OUTPATIENT
Start: 2023-04-22 | End: 2023-04-22 | Stop reason: HOSPADM

## 2023-04-22 RX ORDER — MISOPROSTOL 200 UG/1
800 TABLET ORAL
Status: DISCONTINUED | OUTPATIENT
Start: 2023-04-22 | End: 2023-04-22 | Stop reason: HOSPADM

## 2023-04-22 RX ORDER — ACETAMINOPHEN 325 MG/1
650 TABLET ORAL EVERY 4 HOURS PRN
Status: DISCONTINUED | OUTPATIENT
Start: 2023-04-22 | End: 2023-04-22 | Stop reason: HOSPADM

## 2023-04-22 RX ORDER — NALOXONE HYDROCHLORIDE 0.4 MG/ML
0.2 INJECTION, SOLUTION INTRAMUSCULAR; INTRAVENOUS; SUBCUTANEOUS
Status: DISCONTINUED | OUTPATIENT
Start: 2023-04-22 | End: 2023-04-22 | Stop reason: HOSPADM

## 2023-04-22 RX ORDER — ONDANSETRON 4 MG/1
4 TABLET, ORALLY DISINTEGRATING ORAL EVERY 6 HOURS PRN
Status: DISCONTINUED | OUTPATIENT
Start: 2023-04-22 | End: 2023-04-22 | Stop reason: HOSPADM

## 2023-04-22 RX ORDER — IBUPROFEN 800 MG/1
800 TABLET, FILM COATED ORAL
Status: DISCONTINUED | OUTPATIENT
Start: 2023-04-22 | End: 2023-04-22 | Stop reason: HOSPADM

## 2023-04-22 RX ORDER — OXYTOCIN/0.9 % SODIUM CHLORIDE 30/500 ML
340 PLASTIC BAG, INJECTION (ML) INTRAVENOUS CONTINUOUS PRN
Status: DISCONTINUED | OUTPATIENT
Start: 2023-04-22 | End: 2023-04-22 | Stop reason: HOSPADM

## 2023-04-22 RX ORDER — OXYTOCIN/0.9 % SODIUM CHLORIDE 30/500 ML
100-340 PLASTIC BAG, INJECTION (ML) INTRAVENOUS CONTINUOUS PRN
Status: DISCONTINUED | OUTPATIENT
Start: 2023-04-22 | End: 2023-04-22 | Stop reason: HOSPADM

## 2023-04-22 RX ORDER — METOCLOPRAMIDE HYDROCHLORIDE 5 MG/ML
10 INJECTION INTRAMUSCULAR; INTRAVENOUS EVERY 6 HOURS PRN
Status: DISCONTINUED | OUTPATIENT
Start: 2023-04-22 | End: 2023-04-22 | Stop reason: HOSPADM

## 2023-04-22 RX ORDER — PROCHLORPERAZINE MALEATE 10 MG
10 TABLET ORAL EVERY 6 HOURS PRN
Status: DISCONTINUED | OUTPATIENT
Start: 2023-04-22 | End: 2023-04-22 | Stop reason: HOSPADM

## 2023-04-22 RX ORDER — METOCLOPRAMIDE 10 MG/1
10 TABLET ORAL EVERY 6 HOURS PRN
Status: DISCONTINUED | OUTPATIENT
Start: 2023-04-22 | End: 2023-04-22 | Stop reason: HOSPADM

## 2023-04-22 RX ORDER — PENICILLIN G 3000000 [IU]/50ML
3 INJECTION, SOLUTION INTRAVENOUS EVERY 4 HOURS
Status: DISCONTINUED | OUTPATIENT
Start: 2023-04-22 | End: 2023-04-22 | Stop reason: HOSPADM

## 2023-04-22 RX ORDER — OXYTOCIN 10 [USP'U]/ML
10 INJECTION, SOLUTION INTRAMUSCULAR; INTRAVENOUS
Status: DISCONTINUED | OUTPATIENT
Start: 2023-04-22 | End: 2023-04-22 | Stop reason: HOSPADM

## 2023-04-22 RX ORDER — METHYLERGONOVINE MALEATE 0.2 MG/ML
200 INJECTION INTRAVENOUS
Status: DISCONTINUED | OUTPATIENT
Start: 2023-04-22 | End: 2023-04-22 | Stop reason: HOSPADM

## 2023-04-22 RX ADMIN — PENICILLIN G POTASSIUM 5 MILLION UNITS: 5000000 POWDER, FOR SOLUTION INTRAMUSCULAR; INTRAPLEURAL; INTRATHECAL; INTRAVENOUS at 09:20

## 2023-04-22 RX ADMIN — PENICILLIN G 3 MILLION UNITS: 3000000 INJECTION, SOLUTION INTRAVENOUS at 13:22

## 2023-04-22 RX ADMIN — SODIUM CHLORIDE, POTASSIUM CHLORIDE, SODIUM LACTATE AND CALCIUM CHLORIDE: 600; 310; 30; 20 INJECTION, SOLUTION INTRAVENOUS at 10:32

## 2023-04-22 RX ADMIN — Medication 2 MILLI-UNITS/MIN: at 10:02

## 2023-04-22 ASSESSMENT — ACTIVITIES OF DAILY LIVING (ADL)
FALL_HISTORY_WITHIN_LAST_SIX_MONTHS: NO
ADLS_ACUITY_SCORE: 31
DIFFICULTY_EATING/SWALLOWING: NO
DIFFICULTY_COMMUNICATING: NO
ADLS_ACUITY_SCORE: 20
WEAR_GLASSES_OR_BLIND: YES
VISION_MANAGEMENT: GLASSES AND CONTACTS
ADLS_ACUITY_SCORE: 20
HEARING_DIFFICULTY_OR_DEAF: NO
WALKING_OR_CLIMBING_STAIRS_DIFFICULTY: NO
DRESSING/BATHING_DIFFICULTY: NO
CHANGE_IN_FUNCTIONAL_STATUS_SINCE_ONSET_OF_CURRENT_ILLNESS/INJURY: NO
ADLS_ACUITY_SCORE: 20
CONCENTRATING,_REMEMBERING_OR_MAKING_DECISIONS_DIFFICULTY: NO
DOING_ERRANDS_INDEPENDENTLY_DIFFICULTY: NO
ADLS_ACUITY_SCORE: 20
TOILETING_ISSUES: NO

## 2023-04-22 NOTE — PROVIDER NOTIFICATION
Writer called Hina Petit notified her of Amanda's ctx while standing becoming too close together with less than 60 seconds rest time between, writer requested pt to lay on RS and ctx spaced out. Pt also feels ctx are less on her RS. Plan created to increase pitocin if pt is able to stay on RS. Continue to monitor and decrease pitocin if EFM concerns.

## 2023-04-22 NOTE — PROGRESS NOTES
Hina CN bedside for SVE and evaluation. Cande discusses with Hina her thoughts on continued plan of care and if she should continue with induction or stop induction and go home. Amanda requests time to discuss thoughts with Meera and then would like staff to come back to room.

## 2023-04-22 NOTE — PROGRESS NOTES
Late entry due to patient care.   Data: [facility]: 2023  8:00 AM.  Patient admitted for induction for elevated BMI per pt report. Patient is a .  Prenatal record reviewed. Pregnancy complicated by elevated BMI.  Gestational Age 39w5d. VSS. Fetal movement present. Patient denies leaking fluid or bleeding. Support person is Ya present.   Action: Verbal consent for EFM.  Admission assessment completed. Bill of rights reviewed.  Response: Patient verbalized agreement with plan. Will contact Dr Hina Petit with update and further orders.

## 2023-04-22 NOTE — PROGRESS NOTES
Reviewed discharge AVS, Amanda and Meera verbalize understanding of follow-up plan of care, signs of labor, and when to call the provider. Amanda discharged home.

## 2023-04-22 NOTE — PROGRESS NOTES
Hina Petit CNM called and notified writer that she was placing admission orders, she will be by to evaluate pt in about 20 minutes, requests writer to place IV, writer notified CNM , accels present, moderate variability, minimal at 0827 to 0843. Ctx q 4-5 pt feeling them mildly.

## 2023-04-22 NOTE — PROGRESS NOTES
"Standing at bedside eating lunch.  Feeling contractions, but coping well.  FOB remains at bedside, supportive.  O: /62 (BP Location: Right arm, Patient Position: Right side, Cuff Size: Adult Regular)   Pulse 76   Temp 98.4  F (36.9  C) (Oral)   Resp 20   Ht 1.549 m (5' 1\")   Wt 106.8 kg (235 lb 6.4 oz)   LMP 2022   SpO2 97%   BMI 44.48 kg/m    Oxytocin increased to 8mu/min  FHR Cat I with accels  Ctx every 2-5 minutes, palpate mod  VE deferred  A:  at 39w5d, IOL due to maternal obesity (BMI >40) GBS positive  P: Continue abx for GBS positive  Continue to titrate pitocin per policy  Anticipate progress/NSVB  Hina Petit, APRN, CNM  TT with patient 15\" with >50% spent on counseling/coordination of care.    "

## 2023-04-22 NOTE — PLAN OF CARE
Problem: Plan of Care - These are the overarching goals to be used throughout the patient stay.    Goal: Optimal Comfort and Wellbeing  Intervention: Provide Person-Centered Care  Recent Flowsheet Documentation  Taken 4/22/2023 0841 by Yeimy Jose RN  Trust Relationship/Rapport:   care explained   emotional support provided   empathic listening provided   questions encouraged   reassurance provided   thoughts/feelings acknowledged   Goal Outcome Evaluation:                  Amanda and Meera oriented to room 25, both appear comfortable at this time and writer, as well as CNM, encouraged questions. Plan to begin pitocin for induction of labor.

## 2023-04-22 NOTE — PROGRESS NOTES
"Labor Progress Note:    Patient Name:  Amanda Miller  :      2000  MRN:      [id number]      Assessment:     at 39w5d  IOL labor for BMI >40, not currently in active labor  Cat I FHTs  IV pitocin x7 hours  GBS positive  Pt desiring to not proceed with IOL at this time    Plan:   -Reviewed recommendation for continued IOL.   Reviewed normalcy of progress thus far during IOL. Discussed option of AROM.  Discussed option of IV pitocin break for several hours or overnight and reinitiating IV pitocin.  -Discussion re: rationale for IOL at 39 0/7- 39 6/7 weeks gestation due to prepregnancy BMI >40.  Reviewed risks of IUFD.  After discussion of all options and risks/benefits/alternatives pt continues to prefer to return home.  -IV pitocin discontinued.  Will monitor x30 minutes and if fetal status is reassuring will discontinue IV and discharge patient to home.      Subjective:  Amanda Miller is agreeable to VE to assess progress.  After VE pt verbalizes wanting to discontinue IOL and return home.  \"I feel like my body isn't ready\"  Expresses concern re: continuing to push her body.  Prefers to await spontaneous labor.    Objective:  /67 (BP Location: Right arm, Patient Position: Sitting, Cuff Size: Adult Regular)   Pulse 61   Temp 98.2  F (36.8  C) (Oral)   Resp 16   Ht 1.549 m (5' 1\")   Wt 106.8 kg (235 lb 6.4 oz)   LMP 2022   SpO2 97%   BMI 44.48 kg/m      Oxytocin at 12mu/min    FHR: 145bpm, moderate variability, + accels, no decels    Uterine contractions: Every 3-6 minutes, palpate mod    SVE:  3-4/80/-1.  Intact.  Midline.  Soft.  Has received 2 doses of IV PCN.      Provider:  BLAYNE Barnes CNM    Date:  2023  Time:  5:34 PM    Total time spent with patient:45\", >50% time spent counseling and coordination of care.      "

## 2023-04-22 NOTE — PROGRESS NOTES
"Pt resting on her side in bed.  Reports she feels contractions are slightly stronger but not needing to stop and breathe to get through them.  Multiple family members present at bedside.  O: /63 (BP Location: Right arm, Patient Position: Standing, Cuff Size: Adult Regular)   Pulse 79   Temp 97.8  F (36.6  C) (Oral)   Resp 16   Ht 1.549 m (5' 1\")   Wt 106.8 kg (235 lb 6.4 oz)   LMP 2022   SpO2 97%   BMI 44.48 kg/m     Oxytocin at 12mu/elijah  FHR Cat I with accels  Ctx every 2-4 minuets, palpate mod  VE deferred  A:  at 39w5d, IOL for maternal obesity, GBS positive  P: Continue abx for GBS  Continue to titrate pitocin per policy  Plan VE ~1430 to assess progress  Anticipate progress/NSVB  Hina Petit, BLAYNE, CNM  TT with patient 15\" with >50% spent on counseling/coordination of care.    "

## 2023-04-22 NOTE — DISCHARGE INSTRUCTIONS
Please keep your scheduled OB visit with Kacie Byers CNM on Monday 04/24/23. It was a pleasure to meet you Amanda and Meera, I wish the best for you when it comes time for labor and delivery! Sincerely, Yeimy

## 2023-04-22 NOTE — H&P
Date: 2023  Time: 11:53 AM    Admission H&P  Amanda Miller,  2000, MRN [id number]    [facility]   Encounter for induction of labor [Z34.90]    PCP: Jose Cloud, [phone]          Extended Emergency Contact Information  Primary Emergency Contact: Meera Delgado  Mobile Phone: [phone]  Relation: Spouse       Chief Complaint: Encounter for induction of labor       HPI:      Amanda Miller, is a 22 year old,  at 39w5d, LMP 2022, Estimated Date of Delivery: 2023, confirmed by ultrasound at 9 weeks, admitted to [facility] on 2023 secondary to: scheduled IOL due to BMI >40    Prenatal History:  Amanda Miller began care with [facility] at the  [facility] on 2022 at 9 weeks gestation with regular care thereafter for a total of 12 visits. Her care was complicated by maternal obesity (BMI>40), anxiety and depression (no medications during pregnancy), echogenic intracardiac focus with normal NIPS, GBS positive    Pre-pregnant weight:  226 lbs   Pre-pregnant BMI: 42.72    Total weight gain:  9 lbs    Labs:  Admission on 2023   Component Date Value Ref Range Status     WBC Count 2023 8.2  4.0 - 11.0 10e3/uL Final     RBC Count 2023 4.76  3.80 - 5.20 10e6/uL Final     Hemoglobin 2023 13.2  11.7 - 15.7 g/dL Final     Hematocrit 2023 39.8  35.0 - 47.0 % Final     MCV 2023 84  78 - 100 fL Final     MCH 2023 27.7  26.5 - 33.0 pg Final     MCHC 2023 33.2  31.5 - 36.5 g/dL Final     RDW 2023 15.1 (H)  10.0 - 15.0 % Final     Platelet Count 2023 219  150 - 450 10e3/uL Final     ABO/RH(D) 2023 O POS   Final     Antibody Screen 2023 Negative  Negative Final     SPECIMEN EXPIRATION DATE 2023 [id number]   Final     Hold Specimen 2023 [facility]   Final   Prenatal Office Visit on 2023   Component Date Value Ref Range Status     Group B  "Strep PCR 03/30/2023 Positive (A)  Negative Final    ALERT: Streptococcus agalactiae (Group B Streptococcus) has a high rate of resistance to clindamycin. Therefore, clindamycin is not recommended for treatment unless susceptibility testing has been performed.     Hemoglobin 03/30/2023 13.6  11.7 - 15.7 g/dL Final   Prenatal Office Visit on 01/23/2023   Component Date Value Ref Range Status     Treponema Antibody Total 01/23/2023 Nonreactive  Nonreactive Final     Glu Gest Screen 1hr 50g 01/23/2023 100  70 - 129 mg/dL Final     Hemoglobin 01/23/2023 12.6  11.7 - 15.7 g/dL Final     Hold Specimen 01/23/2023 [facility]   Final     Hold Specimen 01/23/2023 [facility]   Final     Hold Specimen 01/23/2023 [facility]   Final   Prenatal Office Visit on 10/21/2022   Component Date Value Ref Range Status     See Scanned Result 10/21/2022 INVITAE NON-INVASIVE PRENATAL SCREENING-Scanned   Final     Glu Gest Screen 1hr 50g 10/21/2022 127  70 - 129 mg/dL Final     Lead Venous Blood 10/21/2022 <2.0  <=4.9 ug/dL Final    Comment: INTERPRETIVE INFORMATION: Lead, Blood (Venous)    Analysis performed by Inductively Coupled Plasma-Mass   Spectrometry (ICP-MS).    Elevated results may be due to skin or collection-related   contamination, including the use of a noncertified   lead-free tube. If contamination concerns exist due to   elevated levels of blood lead, confirmation with a second   specimen collected in a certified lead-free tube is   recommended.    Information sources for blood lead reference intervals and   interpretive comments include the CDC's \"Childhood Lead   Poisoning Prevention: Recommended Actions Based on Blood   Lead Level\" and the \"Adult Blood Lead Epidemiology and   Surveillance: Reference Blood Lead Levels (BLLs) for Adults   in the [place].\" Thresholds and time intervals for retesting,   medical evaluation, and response vary by state and   regulatory body. Contact your State Department of Health   and/or applicable regulatory agency " for specific guidance   on medical management                            recommendations.    This test was developed and its performance characteristics   determined by [employer]. It has not been cleared or   approved by the U.S. Food and Drug Administration. This   test was performed in a CLIA-certified laboratory and is   intended for clinical purposes.         Group          Concentration   Comment    Children       3.5-19.9 ug/dL  Children under the age of 6                                 years are the most vulnerable                                 to the harmful effects of                                  lead exposure. Environmental                                  investigation and exposure                                  history to identify potential                                 sources of lead. Biological                                  and nutritional monitoring                                 are recommended. Follow-up                                  blood lead monitoring is                                  recommended.                                                           20-44.9 ug/dL   Lead hazard reduction and                                  prompt medical evaluation are                                 recommended. Contact a                                  Pediatric Environmental                                  Health Specialty Unit or                                  poison control center for                                  guidance.                   Greater than    Critical. Immediate medical                  44.9 ug/dL      evaluation, including                                  detailed neurological exam is                                 recommended. Consider                                  chelation therapy when                                 symptoms of lead toxicity   are                                  present. Contact a Pediatric                                   Environmental Health                                  Specialty Unit or poison                                  control center for                                                             assistance.    Adult          5-19.9 ug/dL    Medical removal is                                  recommended for pregnant                                  women or those who are trying                                 or may become pregnant.                                  Adverse health effects are                                  possible. Reduced lead                                  exposure and increased blood                                  lead monitoring are                                  recommended.                    20-69.9 ug/dL   Adverse health effects are                                  indicated. Medical removal                                  from lead exposure is                                  required by OSHA if blood                                  lead level exceeds 50 ug/dL.                                 Prompt medical evaluation is                                 recommended.                    Greater than    Critical. Immediate medical                                             69.9 ug/dL      evaluation is recommended.                                  Consider chelation therapy                                 when symptoms of lead                                  toxicity are present.  Performed By: [facility]  [address] [place] [id number]  : Manuel Fuller MD, PhD   Prenatal Office Visit on 09/23/2022   Component Date Value Ref Range Status     Chlamydia trachomatis 09/23/2022 Negative  Negative Final    A negative result by transcription mediated amplification does not preclude the presence of C. trachomatis infection because results are dependent on proper and adequate collection, absence of inhibitors and sufficient rRNA to be detected.      Neisseria gonorrhoeae 2022 Negative  Negative Final    Negative for N. gonorrhoeae rRNA by transcription mediated amplification. A negative result by transcription mediated amplification does not preclude the presence of C. trachomatis infection because results are dependent on proper and adequate collection, absence of inhibitors and sufficient rRNA to be detected.     ABO/RH(D) 2022 O POS   Final     SPECIMEN EXPIRATION DATE 2022   Final     Antibody Screen 2022 Negative  Negative Final     SPECIMEN EXPIRATION DATE 2022   Final     WBC Count 2022 8.7  4.0 - 11.0 10e3/uL Final     RBC Count 2022 4.89  3.80 - 5.20 10e6/uL Final     Hemoglobin 2022 13.2  11.7 - 15.7 g/dL Final     Hematocrit 2022 39.7  35.0 - 47.0 % Final     MCV 2022 81  78 - 100 fL Final     MCH 2022 27.0  26.5 - 33.0 pg Final     MCHC 2022 33.2  31.5 - 36.5 g/dL Final     RDW 2022 14.1  10.0 - 15.0 % Final     Platelet Count 2022 249  150 - 450 10e3/uL Final     Rubella Haylie IgG Instrument Value 2022 1.15  <0.90 Index Final     Rubella Antibody IgG 2022 Positive   Final    Suggests previous exposure or immunization and probable immunity.     Treponema Antibody Total 2022 Nonreactive  Nonreactive Final     Hepatitis B Surface Antigen 2022 Nonreactive  Nonreactive Final     Hepatitis C Antibody 2022 Nonreactive  Nonreactive Final     HIV Antigen Antibody Combo 2022 Nonreactive  Nonreactive Final    HIV-1 p24 Ag & HIV-1/HIV-2 Ab Not Detected     Culture 2022 <10,000 CFU/mL Urogenital gia   Final       Pertinent Radiology:  See Prenatal Records for specifics, EIF noted, normal Level II with MFM    OB HISTORY  OB History    Para Term  AB Living   1 0 0 0 0 0   SAB IAB Ectopic Multiple Live Births   0 0 0 0 0      # Outcome Date GA Lbr Carlos/2nd Weight Sex Delivery Anes PTL Lv   1  "Current                  Medical History  Past Medical History:   Diagnosis Date     Anxiety      Depression      Depressive disorder     no medications currently, per pt \"midwife would like me to start again after pregnancy\"     Obesity      Urinary tract infection           Surgical History  She  has a past surgical history that includes [place] Tooth Extraction and tonsillectomy.       Social History  Reviewed, and she  reports that she has never smoked. She has never used smokeless tobacco. She reports that she does not currently use alcohol. She reports that she does not currently use drugs.  Partner: Dwain  Education level: High school  Occupation: Full time parent      Family History  Reviewed, and family history includes Cancer in her paternal grandmother; Depression in her father and mother.          No Known Allergies   Medications Prior to Admission   Medication Sig Dispense Refill Last Dose     acetaminophen (TYLENOL) 500 MG tablet Take 500-1,000 mg by mouth every 6 hours as needed for mild pain   More than a month     aspirin (ASA) 81 MG chewable tablet Take 81 mg by mouth daily   More than a month     calcium carbonate (TUMS) 500 MG chewable tablet Take 1 chew tab by mouth 2 times daily   4/22/2023 at 0000     ferrous sulfate (SLO-FE) 142 (45 Fe) MG CR tablet Take 1 tablet (142 mg) by mouth daily 60 tablet 1 4/21/2023 at 2000     ferrous sulfate (SLO-FE) 142 (45 Fe) MG CR tablet Take 1 tab daily. 30 tablet 3 4/21/2023 at 2000     loratadine (CLARITIN) 10 MG tablet Take 10 mg by mouth daily   More than a month     NONFORMULARY Take 1-2 Bags by mouth daily Raspberry leaf tea   4/21/2023     Prenatal Vit-Fe Fumarate-FA (PRENATAL VITAMIN PO)    4/21/2023 at 2000        Review of Systems:  Pertinent items are noted in HPI.   Physical Exam:  Temp:  [98.4  F (36.9  C)] 98.4  F (36.9  C)  Pulse:  [76-84] 76  Resp:  [20] 20  BP: ()/(54-71) 119/71  SpO2:  [97 %] 97 %    /71 (BP Location: Right arm, " "Patient Position: Right side, Cuff Size: Adult Regular)   Pulse 76   Temp 98.4  F (36.9  C) (Oral)   Resp 20   Ht 1.549 m (5' 1\")   Wt 106.8 kg (235 lb 6.4 oz)   LMP 07/06/2022   SpO2 97%   BMI 44.48 kg/m      Height: 5' 1\"  General appearance:  comfortable  Psych: AAO x3  Skin: Pink, warm & dry  HEENT: unremarkable  Cardiovascular:  RRR, S1, S2, no extra sounds or murmurs  Respiratory:  breath sounds CTA bilaterally, anteriorly and posteriorly  Abdomen: soft, NT, gravid  Leopolds: Vertex         EFW:<4500 grams (AGA) 7#4oz     FHR:Baseline: 135 bpm, Variability: Moderate (6 - 25 bpm), Accelerations: present and Decelerations: Absent    Uterine contractions: Frequency: Every 4-8 minutes, Duration: 60 seconds and Intensity: mild    SVE:Dilation of Cervix:  3-4     Score = 2  Effacement:  80%;   Score = 3  Consistency:  Soft;   Score = 2  Position:  Mid;   Score = 1  Station:  -1/0;   Score = 2    Jimenez=10    Extremeties: trace edema  1+ patellar DTRs bilaterally      Membrane Status: intact        Notable AP/IP factors to date:  1.)Maternal obesity--plan VTE prophylaxis postpartum  2.)GBS positive  3.)Depression and anxiety.  Pt desires initiation of Lexapro postpartum  4.)EIF with kim NIPS    Impression:  Amanda Miller is a 22 year old year old at 39w5d weeks, Category I FHTs, here for IOL due to BMI >40.  Jimenez=10.  No need for cervical ripening.  Is candidate for pitocin induction of labor.      Plan:  [facility].  [facility]  2.  Routine CNM care.  Pt hoping for unmedicated birth, aware of pain medication options in labor  3.  Initiate GBS prophylaxis and IV pitocin  4.  Discussed recommendation for AROM with cervical change/regular contractions.  Pt hoping to avoid if possible  5.  Constant fetal monitoring  6.  Anticipate active labor and spontaneous vaginal birth    Total time with patient:  40 minutes at bedside and in the [facility] obtaining and clarifying the above history, performing the " physical exam, education and counseling and developing this plan of care.  >50% spent on counseling and coordination of care.    Provider: Hina Petit, APRN, CNM

## 2023-04-22 NOTE — DISCHARGE SUMMARY
"Pitocin discontinued.  Contractions spaced to approx every 6-7 minutes.  Pt continues to desire to discharge to home.  FHR Cat I.  Reviewed labor precautions and warning signs.  F/u with Kacie Byers as scheduled Monday.  MFM for  testing Tuesday.  Pt aware she can reach on call CNM with questions/concerns prn.  Hina Petit, APRN, CNM  TT with patient 15\" with >50% spent on counseling/coordination of care.    "

## 2023-04-22 NOTE — PROGRESS NOTES
Hina Petit CNM and writer review FH tracing since oxytocin turned off. Hina give discharge order. Amanda turns light on and is out of bed, monitors removed and requests IV to be removed. IV removed.

## 2023-04-24 ENCOUNTER — PRENATAL OFFICE VISIT (OUTPATIENT)
Dept: MIDWIFE SERVICES | Facility: CLINIC | Age: 23
End: 2023-04-24
Payer: COMMERCIAL

## 2023-04-24 VITALS — DIASTOLIC BLOOD PRESSURE: 68 MMHG | BODY MASS INDEX: 45.16 KG/M2 | SYSTOLIC BLOOD PRESSURE: 106 MMHG | WEIGHT: 239 LBS

## 2023-04-24 DIAGNOSIS — Z34.03 ENCOUNTER FOR SUPERVISION OF NORMAL FIRST PREGNANCY IN THIRD TRIMESTER: Primary | ICD-10-CM

## 2023-04-24 PROCEDURE — 99207 PR PRENATAL VISIT: CPT | Performed by: ADVANCED PRACTICE MIDWIFE

## 2023-04-24 NOTE — PROGRESS NOTES
"Here with Meera Ocasio for a routine prenatal visit at 40w 0d. Offers no questions or concerns. Reports regular fetal movements.  Denies regular painful contractions, bleeding, leaking.        Reviewed her recent attempted induction of labor on Saturday.  Patient states she progressed to 4 cm with the Pitocin but at the recommendation for AROM, she felt nervous that she would not give birth in the \"48-hour window \".  Discussed that there is no clock that starts once her water is broken.  Patient states she heard that in high school and thought that she needed to give birth within 48 hours of her water bag being opened.  Reassured that this is not the case.  Discussed amniotomy as a tool for induction of labor.  After hearing this, patient feels more comfortable about attempting an induction of labor again.  Jimenez score =11.      Reviewed reasoning for recommendation for induction of labor beginning at 39 weeks and 0 days due to prepregnant BMI over 40, to reduce the risk of stillbirth.  After further discussion as a couple, they elect to schedule induction of labor on Wednesday at [facility] at 40w2d.  She will plan to arrive at 7:30 AM and call at 6 AM.  On-call ALESSANDRA Ding copied here.     She is scheduled to see maternal-fetal medicine tomorrow for repeat BPP.    Twice daily fetal kick counts encouraged after reaching EDB. Reviewed how to perform these.     Reviewed term labor precautions, warning signs and when/how to call the on-call CNBON.     "

## 2023-04-25 ENCOUNTER — ANCILLARY PROCEDURE (OUTPATIENT)
Dept: ULTRASOUND IMAGING | Facility: HOSPITAL | Age: 23
End: 2023-04-25
Attending: OBSTETRICS & GYNECOLOGY
Payer: COMMERCIAL

## 2023-04-25 ENCOUNTER — OFFICE VISIT (OUTPATIENT)
Dept: MATERNAL FETAL MEDICINE | Facility: HOSPITAL | Age: 23
End: 2023-04-25
Attending: OBSTETRICS & GYNECOLOGY
Payer: COMMERCIAL

## 2023-04-25 ENCOUNTER — DOCUMENTATION ONLY (OUTPATIENT)
Dept: MIDWIFE SERVICES | Facility: CLINIC | Age: 23
End: 2023-04-25

## 2023-04-25 DIAGNOSIS — O99.213 OBESITY AFFECTING PREGNANCY IN THIRD TRIMESTER: Primary | ICD-10-CM

## 2023-04-25 DIAGNOSIS — O99.213 MATERNAL OBESITY SYNDROME IN THIRD TRIMESTER: ICD-10-CM

## 2023-04-25 PROCEDURE — 76819 FETAL BIOPHYS PROFIL W/O NST: CPT

## 2023-04-25 PROCEDURE — 76819 FETAL BIOPHYS PROFIL W/O NST: CPT | Mod: 26 | Performed by: OBSTETRICS & GYNECOLOGY

## 2023-04-25 PROCEDURE — 99207 PR NO CHARGE LOS: CPT | Performed by: OBSTETRICS & GYNECOLOGY

## 2023-04-25 NOTE — NURSING NOTE
"Patient reports positive fetal movement, reports contractions with walking, but \"they go away when I lay down\", denies leaking of fluid, but does report \"losing my mucus plug\". SBAR given to ADRIANNE KIMBALL, see their note in Epic.      "

## 2023-04-26 ENCOUNTER — DOCUMENTATION ONLY (OUTPATIENT)
Dept: MIDWIFE SERVICES | Facility: CLINIC | Age: 23
End: 2023-04-26
Payer: COMMERCIAL

## 2023-04-26 NOTE — PROGRESS NOTES
Charge nurse Beatriz at [facility] explained that this patient's IOL scheduled today at 0730, pushed to 10 am, then to 12 noon would need to be postponed until tomorrow, April 27, 2023 due to bed availability. BLAYNE Pugh CNM is on call tomorrow.  This writer staff messaged her regarding the IOL scheduled change.

## 2023-04-27 ENCOUNTER — TELEPHONE (OUTPATIENT)
Dept: MIDWIFE SERVICES | Facility: CLINIC | Age: 23
End: 2023-04-27
Payer: COMMERCIAL

## 2023-04-27 NOTE — TELEPHONE ENCOUNTER
PHONE NOTE: Asked by charge RN at [facility] to call pt to let her know that there will be no IOL today. Pt is on the schedule for tomorrow 4/28/23.  Patient knows to call at 6 AM to see if she is able to come in at 0730 to starting her induction.  ALESSANDRA Osuna is on-call for [facility] tomorrow and is copied.  Patient knows to call with any signs of labor, decreased fetal movement, loss of fluid, spotting or bleeding, fever, etc.  Patient has phone numbers and knows how to get ahold of the certified nurse midwives.

## 2023-04-28 ENCOUNTER — ANESTHESIA EVENT (OUTPATIENT)
Dept: OBGYN | Facility: HOSPITAL | Age: 23
End: 2023-04-28
Payer: COMMERCIAL

## 2023-04-28 ENCOUNTER — TELEPHONE (OUTPATIENT)
Dept: MIDWIFE SERVICES | Facility: CLINIC | Age: 23
End: 2023-04-28
Payer: COMMERCIAL

## 2023-04-28 ENCOUNTER — ANESTHESIA (OUTPATIENT)
Dept: OBGYN | Facility: HOSPITAL | Age: 23
End: 2023-04-28
Payer: COMMERCIAL

## 2023-04-28 DIAGNOSIS — O99.213 OBESITY AFFECTING PREGNANCY IN THIRD TRIMESTER: Primary | ICD-10-CM

## 2023-04-28 PROCEDURE — 250N000011 HC RX IP 250 OP 636: Performed by: ANESTHESIOLOGY

## 2023-04-28 PROCEDURE — 370N000003 HC ANESTHESIA WARD SERVICE: Performed by: ANESTHESIOLOGY

## 2023-04-28 RX ORDER — BUPIVACAINE HYDROCHLORIDE 2.5 MG/ML
INJECTION, SOLUTION EPIDURAL; INFILTRATION; INTRACAUDAL
Status: COMPLETED | OUTPATIENT
Start: 2023-04-28 | End: 2023-04-28

## 2023-04-28 RX ADMIN — BUPIVACAINE HYDROCHLORIDE 5 ML: 2.5 INJECTION, SOLUTION EPIDURAL; INFILTRATION; INTRACAUDAL at 22:20

## 2023-04-28 NOTE — TELEPHONE ENCOUNTER
Pt has been bumped from induction at [facility] several times this week and is calling to see if she can get scheduled for induction at ? Please call her to discuss.

## 2023-04-28 NOTE — TELEPHONE ENCOUNTER
Nery Cha, Georgina Moncada CNM, [facility]  Caller: Unspecified (Today,  8:25 AM)  Patient is more likely to get into [facility] sooner.  Patient aware of plan, will likely be abl to start IOL process this afternoon or evening there.  AB will maintain contact with patient.

## 2023-04-29 PROBLEM — Z34.90 ENCOUNTER FOR INDUCTION OF LABOR: Status: RESOLVED | Noted: 2023-04-22 | Resolved: 2023-04-29

## 2023-04-29 NOTE — ANESTHESIA POSTPROCEDURE EVALUATION
Patient: Amanda Miller    Procedure: * No procedures listed *  Induction    Anesthesia Type:  Epidural    Note:  Disposition: Inpatient   Postop Pain Control: Uneventful            Sign Out: Well controlled pain   PONV: No   Neuro/Psych: Uneventful            Sign Out: Acceptable/Baseline neuro status   Airway/Respiratory: Uneventful            Sign Out: Acceptable/Baseline resp. status   CV/Hemodynamics: Uneventful            Sign Out: Acceptable CV status; No obvious hypovolemia; No obvious fluid overload   Other NRE: NONE   DID A NON-ROUTINE EVENT OCCUR? No           Last vitals:  Vitals:    04/29/23 0652 04/29/23 0657 04/29/23 0702   BP:      Pulse:      Resp:      Temp:      SpO2: 99% 98% 99%       Electronically Signed By: Nallely Talley MD  April 29, 2023  9:59 AM

## 2023-04-29 NOTE — ANESTHESIA PROCEDURE NOTES
Epidural catheter Procedure Note    Pre-Procedure   Staff -        Anesthesiologist:  Dez Preston MD       Performed By: anesthesiologist       Location: OB       Procedure Start/Stop Times: 4/28/2023 10:00 PM and 4/28/2023 10:25 PM       Pre-Anesthestic Checklist: patient identified, IV checked, risks and benefits discussed, informed consent, monitors and equipment checked, pre-op evaluation and at physician/surgeon's request  Timeout:       Correct Patient: Yes        Correct Procedure: Yes        Correct Site: Yes        Correct Position: Yes   Procedure Documentation  Procedure: epidural catheter       Diagnosis: labor pain for anticipated vaginal delivery       Patient Position: sitting       Patient Prep/Sterile Barriers: sterile gloves, mask, patient draped       Skin prep: Chloraprep       Local skin infiltrated with 3 mL of 1% lidocaine.        Insertion Site: L3-4. (midline approach).       Technique: LORT saline        CHRISTO at 6 cm.       Needle Type: [employer]       Needle Gauge: 18.        Needle Length (Inches): 3.5        Catheter: 20 G.          Catheter threaded easily.         6 cm epidural space.         Threaded 12 cm at skin.         # of attempts: 1 and  # of redirects:  0    Assessment/Narrative         Paresthesias: No.       Test dose of 3 mL lidocaine 1.5% w/ 1:200,000 epinephrine at 22:15 CDT.         Test dose negative, 3 minutes after injection, for signs of intravascular, subdural, or intrathecal injection.       Insertion/Infusion Method: LORT saline       Aspiration negative for Heme or CSF via Epidural Catheter.    Medication(s) Administered   0.25% Bupivacaine PF (Epidural) - EPIDURAL   5 mL - 4/28/2023 10:20:00 PM  Medication Administration Time: 4/28/2023 10:00 PM     Comments:  Delay in getting medications into the room, as pharmacy had not yet profiled meds at time of epidural placement. Delayed test dose until meds available.       FOR [facility] ([facility]) ONLY:   Pain Team  "Contact information: please page the Pain Team Via [facility]. Search \"Pain\". During daytime hours, please page the attending first. At night please page the resident first.      "

## 2023-04-29 NOTE — ANESTHESIA PREPROCEDURE EVALUATION
"Anesthesia Pre-Procedure Evaluation    Patient: Amanda Miller   MRN: [id number] : 2000        Procedure : * No procedures listed *  Induction       Past Medical History:   Diagnosis Date     Anxiety      Depression      Depressive disorder     no medications currently, per pt \"midwife would like me to start again after pregnancy\"     Obesity      Urinary tract infection       Past Surgical History:   Procedure Laterality Date     TONSILLECTOMY       WISDOM TOOTH EXTRACTION        No Known Allergies   Social History     Tobacco Use     Smoking status: Never     Smokeless tobacco: Never   Vaping Use     Vaping status: Never Used   Substance Use Topics     Alcohol use: Not Currently      Wt Readings from Last 1 Encounters:   23 108 kg (238 lb 1.6 oz)        Anesthesia Evaluation            ROS/MED HX  ENT/Pulmonary:  - neg pulmonary ROS     Neurologic:  - neg neurologic ROS     Cardiovascular:  - neg cardiovascular ROS     METS/Exercise Tolerance:     Hematologic:  - neg hematologic  ROS     Musculoskeletal:       GI/Hepatic:  - neg GI/hepatic ROS     Renal/Genitourinary:       Endo:  - neg endo ROS   (+) Obesity (bmi 45),     Psychiatric/Substance Use:  - neg psychiatric ROS   (+) psychiatric history anxiety and depression     Infectious Disease:       Malignancy:       Other:            Physical Exam    Airway             Respiratory Devices and Support         Dental  no notable dental history         Cardiovascular   cardiovascular exam normal          Pulmonary   pulmonary exam normal            Other findings: Physiologic changes of pregnancy    OUTSIDE LABS:  CBC:   Lab Results   Component Value Date    WBC 8.2 2023    WBC 8.7 2022    HGB 12.9 2023    HGB 13.2 2023    HCT 39.8 2023    HCT 39.7 2022     2023     2022     BMP: No results found for: NA, POTASSIUM, CHLORIDE, CO2, BUN, CR, GLC  COAGS: No results found for: PTT, INR, " FIBR  POC: No results found for: BGM, HCG, HCGS  HEPATIC: No results found for: ALBUMIN, PROTTOTAL, ALT, AST, GGT, ALKPHOS, BILITOTAL, BILIDIRECT, PAU  OTHER: No results found for: PH, LACT, A1C, TINA, PHOS, MAG, LIPASE, AMYLASE, TSH, T4, T3, CRP, SED    Anesthesia Plan    ASA Status:  3      Anesthesia Type: Epidural.              Consents    Anesthesia Plan(s) and associated risks, benefits, and realistic alternatives discussed. Questions answered and patient/representative(s) expressed understanding.    - Discussed:     - Discussed with:  Patient         Postoperative Care            Comments:    Other Comments: Patient requests labor epidural. Chart reviewed, including labs. Reviewed options and risks with the patient, including but not limited to: bleeding, infection, damage to tissues under the skin (nerves, muscles, blood vessels), hypotension, headache, and epidural failure. Questions answered, consent signed. Patient agrees to elective labor epidural.        neg OB ROS.       Dez Preston MD   Detail Level: Simple Price (Do Not Change): 0.00 Instructions: This plan will send the code FBSD to the PM system.  DO NOT or CHANGE the price.

## 2023-05-01 ENCOUNTER — MEDICAL CORRESPONDENCE (OUTPATIENT)
Dept: HEALTH INFORMATION MANAGEMENT | Facility: CLINIC | Age: 23
End: 2023-05-01
Payer: COMMERCIAL

## 2023-05-30 ENCOUNTER — HOSPITAL ENCOUNTER (INPATIENT)
Facility: HOSPITAL | Age: 23
LOS: 2 days | Discharge: HOME | End: 2023-06-02
Attending: EMERGENCY MEDICINE | Admitting: FAMILY MEDICINE
Payer: COMMERCIAL

## 2023-05-30 DIAGNOSIS — K85.10 ACUTE BILIARY PANCREATITIS WITHOUT INFECTION OR NECROSIS: Primary | ICD-10-CM

## 2023-05-30 DIAGNOSIS — K81.9 CHOLECYSTITIS: ICD-10-CM

## 2023-05-30 PROCEDURE — 99285 EMERGENCY DEPT VISIT HI MDM: CPT

## 2023-05-31 ENCOUNTER — APPOINTMENT (OUTPATIENT)
Dept: ULTRASOUND IMAGING | Facility: HOSPITAL | Age: 23
End: 2023-05-31
Attending: EMERGENCY MEDICINE
Payer: COMMERCIAL

## 2023-05-31 PROBLEM — K81.9 CHOLECYSTITIS: Status: ACTIVE | Noted: 2023-05-31

## 2023-05-31 PROBLEM — K80.20 CHOLELITHIASIS: Status: ACTIVE | Noted: 2023-05-31

## 2023-05-31 PROBLEM — K85.10 ACUTE BILIARY PANCREATITIS WITHOUT INFECTION OR NECROSIS: Status: ACTIVE | Noted: 2023-05-31

## 2023-05-31 LAB
ALBUMIN SERPL BCG-MCNC: 3.6 G/DL (ref 3.5–5.2)
ALBUMIN SERPL BCG-MCNC: 3.7 G/DL (ref 3.5–5.2)
ALBUMIN UR-MCNC: 20 MG/DL
ALP SERPL-CCNC: 194 U/L (ref 35–104)
ALP SERPL-CCNC: 216 U/L (ref 35–104)
ALT SERPL W P-5'-P-CCNC: 209 U/L (ref 10–35)
ALT SERPL W P-5'-P-CCNC: 253 U/L (ref 10–35)
ANION GAP SERPL CALCULATED.3IONS-SCNC: 10 MMOL/L (ref 7–15)
APPEARANCE UR: CLEAR
AST SERPL W P-5'-P-CCNC: 111 U/L (ref 10–35)
AST SERPL W P-5'-P-CCNC: 202 U/L (ref 10–35)
BACTERIA #/AREA URNS HPF: ABNORMAL /HPF
BASOPHILS # BLD AUTO: 0 10E3/UL (ref 0–0.2)
BASOPHILS NFR BLD AUTO: 1 %
BILIRUB DIRECT SERPL-MCNC: 0.24 MG/DL (ref 0–0.3)
BILIRUB DIRECT SERPL-MCNC: 0.32 MG/DL (ref 0–0.3)
BILIRUB SERPL-MCNC: 0.7 MG/DL
BILIRUB SERPL-MCNC: 0.8 MG/DL
BILIRUB UR QL STRIP: NEGATIVE
BUN SERPL-MCNC: 13.7 MG/DL (ref 6–20)
CALCIUM SERPL-MCNC: 9.1 MG/DL (ref 8.6–10)
CHLORIDE SERPL-SCNC: 107 MMOL/L (ref 98–107)
COLOR UR AUTO: YELLOW
CREAT SERPL-MCNC: 0.76 MG/DL (ref 0.51–0.95)
DEPRECATED HCO3 PLAS-SCNC: 25 MMOL/L (ref 22–29)
EOSINOPHIL # BLD AUTO: 0.2 10E3/UL (ref 0–0.7)
EOSINOPHIL NFR BLD AUTO: 3 %
ERYTHROCYTE [DISTWIDTH] IN BLOOD BY AUTOMATED COUNT: 13.8 % (ref 10–15)
GFR SERPL CREATININE-BSD FRML MDRD: >90 ML/MIN/1.73M2
GLUCOSE SERPL-MCNC: 109 MG/DL (ref 70–99)
GLUCOSE UR STRIP-MCNC: NEGATIVE MG/DL
HCG UR QL: NEGATIVE
HCT VFR BLD AUTO: 36.8 % (ref 35–47)
HGB BLD-MCNC: 11.6 G/DL (ref 11.7–15.7)
HGB UR QL STRIP: NEGATIVE
IMM GRANULOCYTES # BLD: 0 10E3/UL
IMM GRANULOCYTES NFR BLD: 0 %
KETONES UR STRIP-MCNC: NEGATIVE MG/DL
LEUKOCYTE ESTERASE UR QL STRIP: ABNORMAL
LIPASE SERPL-CCNC: 2218 U/L (ref 13–60)
LYMPHOCYTES # BLD AUTO: 1.4 10E3/UL (ref 0.8–5.3)
LYMPHOCYTES NFR BLD AUTO: 21 %
MCH RBC QN AUTO: 26.7 PG (ref 26.5–33)
MCHC RBC AUTO-ENTMCNC: 31.5 G/DL (ref 31.5–36.5)
MCV RBC AUTO: 85 FL (ref 78–100)
MONOCYTES # BLD AUTO: 0.4 10E3/UL (ref 0–1.3)
MONOCYTES NFR BLD AUTO: 6 %
MUCOUS THREADS #/AREA URNS LPF: PRESENT /LPF
NEUTROPHILS # BLD AUTO: 4.6 10E3/UL (ref 1.6–8.3)
NEUTROPHILS NFR BLD AUTO: 69 %
NITRATE UR QL: NEGATIVE
NRBC # BLD AUTO: 0 10E3/UL
NRBC BLD AUTO-RTO: 0 /100
PH UR STRIP: 6 [PH] (ref 5–7)
PLATELET # BLD AUTO: 261 10E3/UL (ref 150–450)
POTASSIUM SERPL-SCNC: 3.4 MMOL/L (ref 3.4–5.3)
PROT SERPL-MCNC: 6.3 G/DL (ref 6.4–8.3)
PROT SERPL-MCNC: 6.5 G/DL (ref 6.4–8.3)
RBC # BLD AUTO: 4.35 10E6/UL (ref 3.8–5.2)
RBC URINE: <1 /HPF
SODIUM SERPL-SCNC: 142 MMOL/L (ref 136–145)
SP GR UR STRIP: 1.03 (ref 1–1.03)
SQUAMOUS EPITHELIAL: 1 /HPF
TRANSITIONAL EPI: <1 /HPF
TROPONIN T SERPL HS-MCNC: <6 NG/L
UROBILINOGEN UR STRIP-MCNC: <2 MG/DL
WBC # BLD AUTO: 6.7 10E3/UL (ref 4–11)
WBC URINE: 20 /HPF

## 2023-05-31 PROCEDURE — 84484 ASSAY OF TROPONIN QUANT: CPT | Performed by: EMERGENCY MEDICINE

## 2023-05-31 PROCEDURE — G0378 HOSPITAL OBSERVATION PER HR: HCPCS

## 2023-05-31 PROCEDURE — 81001 URINALYSIS AUTO W/SCOPE: CPT | Performed by: EMERGENCY MEDICINE

## 2023-05-31 PROCEDURE — 258N000003 HC RX IP 258 OP 636: Performed by: INTERNAL MEDICINE

## 2023-05-31 PROCEDURE — 96376 TX/PRO/DX INJ SAME DRUG ADON: CPT

## 2023-05-31 PROCEDURE — 96366 THER/PROPH/DIAG IV INF ADDON: CPT

## 2023-05-31 PROCEDURE — 83690 ASSAY OF LIPASE: CPT | Performed by: EMERGENCY MEDICINE

## 2023-05-31 PROCEDURE — 80053 COMPREHEN METABOLIC PANEL: CPT | Performed by: EMERGENCY MEDICINE

## 2023-05-31 PROCEDURE — 250N000011 HC RX IP 250 OP 636: Performed by: INTERNAL MEDICINE

## 2023-05-31 PROCEDURE — 250N000011 HC RX IP 250 OP 636: Performed by: EMERGENCY MEDICINE

## 2023-05-31 PROCEDURE — 36415 COLL VENOUS BLD VENIPUNCTURE: CPT | Performed by: EMERGENCY MEDICINE

## 2023-05-31 PROCEDURE — 99232 SBSQ HOSP IP/OBS MODERATE 35: CPT | Mod: 25 | Performed by: SURGERY

## 2023-05-31 PROCEDURE — 82248 BILIRUBIN DIRECT: CPT | Performed by: EMERGENCY MEDICINE

## 2023-05-31 PROCEDURE — 93005 ELECTROCARDIOGRAM TRACING: CPT | Performed by: EMERGENCY MEDICINE

## 2023-05-31 PROCEDURE — C9113 INJ PANTOPRAZOLE SODIUM, VIA: HCPCS | Performed by: INTERNAL MEDICINE

## 2023-05-31 PROCEDURE — 250N000013 HC RX MED GY IP 250 OP 250 PS 637: Performed by: EMERGENCY MEDICINE

## 2023-05-31 PROCEDURE — 85025 COMPLETE CBC W/AUTO DIFF WBC: CPT | Performed by: EMERGENCY MEDICINE

## 2023-05-31 PROCEDURE — 96375 TX/PRO/DX INJ NEW DRUG ADDON: CPT

## 2023-05-31 PROCEDURE — 96365 THER/PROPH/DIAG IV INF INIT: CPT

## 2023-05-31 PROCEDURE — 258N000003 HC RX IP 258 OP 636: Performed by: FAMILY MEDICINE

## 2023-05-31 PROCEDURE — 99222 1ST HOSP IP/OBS MODERATE 55: CPT | Performed by: FAMILY MEDICINE

## 2023-05-31 PROCEDURE — 82248 BILIRUBIN DIRECT: CPT | Performed by: INTERNAL MEDICINE

## 2023-05-31 PROCEDURE — 120N000001 HC R&B MED SURG/OB

## 2023-05-31 PROCEDURE — 96361 HYDRATE IV INFUSION ADD-ON: CPT

## 2023-05-31 PROCEDURE — 99207 PR NO BILLABLE SERVICE THIS VISIT: CPT | Performed by: INTERNAL MEDICINE

## 2023-05-31 PROCEDURE — 36415 COLL VENOUS BLD VENIPUNCTURE: CPT | Performed by: INTERNAL MEDICINE

## 2023-05-31 PROCEDURE — 81025 URINE PREGNANCY TEST: CPT | Performed by: EMERGENCY MEDICINE

## 2023-05-31 PROCEDURE — 87086 URINE CULTURE/COLONY COUNT: CPT | Performed by: EMERGENCY MEDICINE

## 2023-05-31 PROCEDURE — 250N000011 HC RX IP 250 OP 636: Performed by: FAMILY MEDICINE

## 2023-05-31 PROCEDURE — 76705 ECHO EXAM OF ABDOMEN: CPT

## 2023-05-31 RX ORDER — PIPERACILLIN SODIUM, TAZOBACTAM SODIUM 3; .375 G/15ML; G/15ML
3.38 INJECTION, POWDER, LYOPHILIZED, FOR SOLUTION INTRAVENOUS ONCE
Status: COMPLETED | OUTPATIENT
Start: 2023-05-31 | End: 2023-05-31

## 2023-05-31 RX ORDER — ONDANSETRON 4 MG/1
4 TABLET, ORALLY DISINTEGRATING ORAL EVERY 6 HOURS PRN
Status: DISCONTINUED | OUTPATIENT
Start: 2023-05-31 | End: 2023-06-01

## 2023-05-31 RX ORDER — NALOXONE HYDROCHLORIDE 0.4 MG/ML
0.2 INJECTION, SOLUTION INTRAMUSCULAR; INTRAVENOUS; SUBCUTANEOUS
Status: DISCONTINUED | OUTPATIENT
Start: 2023-05-31 | End: 2023-06-02 | Stop reason: HOSPADM

## 2023-05-31 RX ORDER — ACETAMINOPHEN 500 MG
500-1000 TABLET ORAL EVERY 6 HOURS PRN
COMMUNITY
End: 2023-06-13

## 2023-05-31 RX ORDER — NALOXONE HYDROCHLORIDE 0.4 MG/ML
0.4 INJECTION, SOLUTION INTRAMUSCULAR; INTRAVENOUS; SUBCUTANEOUS
Status: DISCONTINUED | OUTPATIENT
Start: 2023-05-31 | End: 2023-06-02 | Stop reason: HOSPADM

## 2023-05-31 RX ORDER — PIPERACILLIN SODIUM, TAZOBACTAM SODIUM 3; .375 G/15ML; G/15ML
3.38 INJECTION, POWDER, LYOPHILIZED, FOR SOLUTION INTRAVENOUS EVERY 8 HOURS
Status: DISCONTINUED | OUTPATIENT
Start: 2023-05-31 | End: 2023-06-02 | Stop reason: HOSPADM

## 2023-05-31 RX ORDER — ONDANSETRON 2 MG/ML
4 INJECTION INTRAMUSCULAR; INTRAVENOUS EVERY 6 HOURS PRN
Status: DISCONTINUED | OUTPATIENT
Start: 2023-05-31 | End: 2023-06-01

## 2023-05-31 RX ORDER — SODIUM CHLORIDE 9 MG/ML
INJECTION, SOLUTION INTRAVENOUS CONTINUOUS
Status: DISCONTINUED | OUTPATIENT
Start: 2023-05-31 | End: 2023-06-02 | Stop reason: HOSPADM

## 2023-05-31 RX ORDER — HYDROMORPHONE HCL IN WATER/PF 6 MG/30 ML
0.4 PATIENT CONTROLLED ANALGESIA SYRINGE INTRAVENOUS
Status: DISCONTINUED | OUTPATIENT
Start: 2023-05-31 | End: 2023-06-01

## 2023-05-31 RX ORDER — MAGNESIUM HYDROXIDE/ALUMINUM HYDROXICE/SIMETHICONE 120; 1200; 1200 MG/30ML; MG/30ML; MG/30ML
15 SUSPENSION ORAL ONCE
Status: COMPLETED | OUTPATIENT
Start: 2023-05-31 | End: 2023-05-31

## 2023-05-31 RX ORDER — ONDANSETRON 2 MG/ML
4 INJECTION INTRAMUSCULAR; INTRAVENOUS ONCE
Status: COMPLETED | OUTPATIENT
Start: 2023-05-31 | End: 2023-05-31

## 2023-05-31 RX ORDER — HYDROMORPHONE HCL IN WATER/PF 6 MG/30 ML
0.2 PATIENT CONTROLLED ANALGESIA SYRINGE INTRAVENOUS
Status: DISCONTINUED | OUTPATIENT
Start: 2023-05-31 | End: 2023-06-01

## 2023-05-31 RX ORDER — MORPHINE SULFATE 4 MG/ML
4 INJECTION, SOLUTION INTRAMUSCULAR; INTRAVENOUS ONCE
Status: COMPLETED | OUTPATIENT
Start: 2023-05-31 | End: 2023-05-31

## 2023-05-31 RX ORDER — LIDOCAINE 40 MG/G
CREAM TOPICAL
Status: DISCONTINUED | OUTPATIENT
Start: 2023-05-31 | End: 2023-06-02 | Stop reason: HOSPADM

## 2023-05-31 RX ADMIN — SODIUM CHLORIDE: 9 INJECTION, SOLUTION INTRAVENOUS at 04:04

## 2023-05-31 RX ADMIN — ALUMINUM HYDROXIDE, MAGNESIUM HYDROXIDE, AND SIMETHICONE 15 ML: 200; 200; 20 SUSPENSION ORAL at 00:33

## 2023-05-31 RX ADMIN — SODIUM CHLORIDE: 9 INJECTION, SOLUTION INTRAVENOUS at 16:05

## 2023-05-31 RX ADMIN — FAMOTIDINE 20 MG: 10 INJECTION, SOLUTION INTRAVENOUS at 00:35

## 2023-05-31 RX ADMIN — PIPERACILLIN AND TAZOBACTAM 3.38 G: 3; .375 INJECTION, POWDER, LYOPHILIZED, FOR SOLUTION INTRAVENOUS at 08:59

## 2023-05-31 RX ADMIN — ONDANSETRON 4 MG: 2 INJECTION INTRAMUSCULAR; INTRAVENOUS at 08:07

## 2023-05-31 RX ADMIN — PIPERACILLIN AND TAZOBACTAM 3.38 G: 3; .375 INJECTION, POWDER, LYOPHILIZED, FOR SOLUTION INTRAVENOUS at 03:41

## 2023-05-31 RX ADMIN — SODIUM CHLORIDE 1000 ML: 9 INJECTION, SOLUTION INTRAVENOUS at 09:19

## 2023-05-31 RX ADMIN — PIPERACILLIN AND TAZOBACTAM 3.38 G: 3; .375 INJECTION, POWDER, LYOPHILIZED, FOR SOLUTION INTRAVENOUS at 16:46

## 2023-05-31 RX ADMIN — PANTOPRAZOLE SODIUM 40 MG: 40 INJECTION, POWDER, FOR SOLUTION INTRAVENOUS at 11:48

## 2023-05-31 ASSESSMENT — ACTIVITIES OF DAILY LIVING (ADL)
ADLS_ACUITY_SCORE: 35
DEPENDENT_IADLS:: INDEPENDENT
ADLS_ACUITY_SCORE: 20
ADLS_ACUITY_SCORE: 20
ADLS_ACUITY_SCORE: 35
ADLS_ACUITY_SCORE: 35
ADLS_ACUITY_SCORE: 20
ADLS_ACUITY_SCORE: 20

## 2023-05-31 NOTE — CONSULTS
Care Management Initial Consult    General Information  Assessment completed with: -chart review,    Type of CM/SW Visit: Chart Assessment    Primary Care Provider verified and updated as needed: Yes   Readmission within the last 30 days: current reason for admission unrelated to previous admission   Return Category: New Diagnosis  Reason for Consult: discharge planning  Advance Care Planning: Advance Care Planning Reviewed: no concerns identified          Communication Assessment  Patient's communication style: spoken language (English or Bilingual)             Cognitive  Cognitive/Neuro/Behavioral:                        Living Environment:   People in home: child(kerry), dependent, spouse     Current living Arrangements: house      Able to return to prior arrangements: yes       Family/Social Support:  Care provided by: self  Provides care for: child(kerry)  Marital Status:     Ya       Description of Support System: Supportive, Involved         Current Resources:   Patient receiving home care services: No     Community Resources: [facility]  Equipment currently used at home:    Supplies currently used at home: None    Employment/Financial:  Employment Status:          Financial Concerns: No concerns identified   Referral to Financial Worker: No       Does the patient's insurance plan have a 3 day qualifying hospital stay waiver?  No    Lifestyle & Psychosocial Needs:  Social Determinants of Health     Tobacco Use: Low Risk  (5/31/2023)    Patient History      Smoking Tobacco Use: Never      Smokeless Tobacco Use: Never      Passive Exposure: Not on file   Alcohol Use: Not on file   Financial Resource Strain: Not on file   Food Insecurity: Not on file   Transportation Needs: Not on file   Physical Activity: Not on file   Stress: Not on file   Social Connections: Not on file   Intimate Partner Violence: Not on file   Depression: Not at risk (3/17/2023)    PHQ-2      PHQ-2 Score: 2   Housing  Stability: Not on file   Postpartum Depression: High Risk (2023)    [place]  Depression Scale      Last EPDS Total Score: 11      Last EPDS Self Harm Result: Never       Functional Status:  Prior to admission patient needed assistance:   Dependent ADLs:: Independent  Dependent IADLs:: Independent  Assesssment of Functional Status: At functional baseline    Mental Health Status:  Mental Health Status: Past Concern  Mental Health Management: Medication    Chemical Dependency Status:  Chemical Dependency Status: No Current Concerns             Values/Beliefs:  Spiritual, Cultural Beliefs, Anglican Practices, Values that affect care: no               Additional Information:  NICOLE chart assessed. Pt is 5 weeks post partum, she lives at home with her spouse and  and is independent without supplies or equipment. Discharge goal to return home with family transport.    Indy Elkins LGSW

## 2023-05-31 NOTE — CONSULTS
"[facility] DIGESTIVE HEALTH CONSULTATION      IMPRESSION:   1.  Gallstone pancreatitis  2.  Possible cholecystitis  3.  Possible retained biliary stone versus passed stone    When patient was seen this morning she is overall feeling somewhat better    RECOMMENDATIONS:   1.  We will repeat a liver panel and compared to the earlier labs to see if there is an upward or downward trend  2.  If there is a significant upward trend in liver chemistries would favor ERCP  3.  If the liver chemistries are improving and she continues to clinically improve would favor proceeding with cholecystectomy when recommended per surgery and at that time favoring IOC if able  4.  If the liver chemistry trend and clinical status are equivocal would favor an MRCP today  5.  We will provide another bolus of intravenous fluids and increase the maintenance rate for today if tolerated  6.  We will follow-up with the patient.  Please call with questions or concerns you may have.        NAME: Amanda Miller    MEDICAL RECORD #: [id number]    DATE / TIME: 5/31/2023/12:12 PM    PRIMARY CARE PROVIDER: Bety Zepeda    REQUESTING PROVIDER: Og Bustillos MD          REASON FOR THE CONSULT: Pancreatitis and possible choledocholithiasis    HPI:     This is a 22-year-old female who is approximately 1 month postpartum who had been having some degree of upper abdominal pain but over the last week has had accelerating symptoms including with pain between her shoulder blades.  She did have some nausea early on.  She has not had any fevers or chills.  It is not clear if eating exacerbate her symptoms while home.    REVIEW OF SYSTEMS: 13 point review of systems is negative except that noted above.    PAST MEDICAL HISTORY:   Past Medical History:   Diagnosis Date     Anxiety      Depression      Depressive disorder     no medications currently, per pt \"midwife would like me to start again after pregnancy\"     Obesity      Urinary tract infection  "       PAST SURGICAL HISTORY:   Past Surgical History:   Procedure Laterality Date     TONSILLECTOMY       WISDOM TOOTH EXTRACTION         FAMILY HISTORY:   Family History   Problem Relation Age of Onset     Depression Mother      Depression Father      Cancer Paternal Grandmother        SOCIAL HISTORY:   Social History     Tobacco Use     Smoking status: Never     Smokeless tobacco: Never   Vaping Use     Vaping status: Never Used   Substance Use Topics     Alcohol use: Not Currently        MEDS:  As below    ALLERGIES/SENSITIVITIES: Patient has no known allergies.    MEDICATIONS:  Current Outpatient Medications   Medication Sig Dispense Refill     acetaminophen (TYLENOL) 500 MG tablet Take 500-1,000 mg by mouth every 6 hours as needed for mild pain       docusate sodium (COLACE) 50 MG capsule Take 50 mg by mouth 2 times daily as needed for constipation       escitalopram (LEXAPRO) 10 MG tablet Take 1 tablet (10 mg) by mouth daily 30 tablet 0     ibuprofen (ADVIL/MOTRIN) 600 MG tablet Take 1 tablet (600 mg) by mouth every 6 hours as needed for other (cramping)       Prenatal Vit-Fe Fumarate-FA (PRENATAL VITAMIN PO) Take 1 tablet by mouth daily         PHYSICAL EXAM:  Temp:  [97.9  F (36.6  C)] 97.9  F (36.6  C)  Pulse:  [59-81] 66  Resp:  [18] 18  BP: ()/(51-75) 108/64  SpO2:  [97 %-100 %] 99 %  Body mass index is 42.97 kg/m .  GEN: Well developed, well nourished 22 year old in no acute distress.  HEENT: sclera anicteric, moist mucous membranes.   LYMPH: No cervical lymphadenopathy  PULM: Nonlabored breathing. Breath sounds equal.   CARDIO: Regular rate  GI: Non-distended. Soft.  Mild tenderness to palpation of upper abdomen.  No guarding. No rebound tenderness.  EXT: warm, no lower extremity edema  NEURO: Alert. No focal defects.   PSYCH: Mental status appropriate, mood and affect normal.    SKIN: No rashes  MSK: No joint abnormalities    LABORATORY DATA:  CBC RESULTS:     Recent Labs   Lab Test  05/31/23  0020 04/28/23  1729 04/22/23  0901 01/23/23  1659 09/23/22  1409   WBC 6.7  --  8.2  --  8.7   RBC 4.35  --  4.76  --  4.89   HGB 11.6* 12.9 13.2   < > 13.2   HCT 36.8  --  39.8  --  39.7   MCV 85  --  84  --  81   MCH 26.7  --  27.7  --  27.0   MCHC 31.5  --  33.2  --  33.2   RDW 13.8  --  15.1*  --  14.1     --  219  --  249    < > = values in this interval not displayed.        CMP Results:   Recent Labs   Lab Test 05/31/23  0020      POTASSIUM 3.4   CHLORIDE 107   CO2 25   ANIONGAP 10   *   BUN 13.7   CR 0.76   BILITOTAL 0.7   ALKPHOS 216*   *   *        INR Results: No results for input(s): INR in the last 23318 hours.       IMAGING: Ultrasound reviewed.  No biliary dilatation noted.    A total of 40 minutes was spent reviewing the record, interacting and evaluating the patient, placing orders and generating consult note.                 Luis Carrasco MD  Thank you for the opportunity to participate in the care of this patient.   Please feel free to call me with any questions or concerns.  Phone number [phone].            CC: [facility]

## 2023-05-31 NOTE — PHARMACY-ADMISSION MEDICATION HISTORY
Pharmacy Intern Admission Medication History    Admission medication history is complete. The information provided in this note is only as accurate as the sources available at the time of the update.    Medication reconciliation/reorder completed by provider prior to medication history? No    Information Source(s): Patient and CareEverywhere/SureScripts via in-person    Pertinent Information: None    Changes made to PTA medication list:    Added: Tylenol, Docusate    Deleted: None    Changed: None    Allergies reviewed with patient and updates made in EHR: yes    Medication History Completed By: Maryjo Miller 5/31/2023 9:28 AM    Prior to Admission medications    Medication Sig Last Dose Taking? Auth Provider Long Term End Date   acetaminophen (TYLENOL) 500 MG tablet Take 500-1,000 mg by mouth every 6 hours as needed for mild pain 5/30/2023 at PM (2 tablets taken) Yes Unknown, Entered By History No    docusate sodium (COLACE) 50 MG capsule Take 50 mg by mouth 2 times daily as needed for constipation Past Month at PRN Yes Unknown, Entered By History     escitalopram (LEXAPRO) 10 MG tablet Take 1 tablet (10 mg) by mouth daily 5/30/2023 at AM Yes Theresa Osuna APRN CNM Yes    ibuprofen (ADVIL/MOTRIN) 600 MG tablet Take 1 tablet (600 mg) by mouth every 6 hours as needed for other (cramping) 5/30/2023 at PM (2 doses taken) Yes Theresa Osuna APRN CNM     Prenatal Vit-Fe Fumarate-FA (PRENATAL VITAMIN PO) Take 1 tablet by mouth daily 5/30/2023 at AM Yes Reported, Patient

## 2023-05-31 NOTE — ED NOTES
"[facility] ED Handoff Report    ED Chief Complaint: Patient arrives from home.   C/o pain behind shoulder blades and upper abdominal area that has been ongoing for two weeks.     Five week postpartum and is concerned about ongoing pain.     Took Tylenol and Ibuprofen today but unsure of time.       ED Diagnosis:  (K85.10) Acute biliary pancreatitis without infection or necrosis  Comment:   Plan: Case Request: CHOLECYSTECTOMY, ROBOT-ASSISTED,         LAPAROSCOPIC, USING DA LINDSAY XI, POSSIBLE         INTRAOPERATIVE CHOLANGIOGRAM               PMH:    Past Medical History:   Diagnosis Date    Anxiety     Depression     Depressive disorder     no medications currently, per pt \"midwife would like me to start again after pregnancy\"    Obesity     Urinary tract infection         Code Status:  Full Code     Falls Risk: No Band: Not applicable    Current Living Situation/Residence: lives with a significant other     Elimination Status: Continent: Yes     Activity Level: Independent    Patients Preferred Language:  English     Needed: No    Vital Signs:  BP 90/56   Pulse 67   Temp 97.9  F (36.6  C) (Temporal)   Resp 18   Ht 1.524 m (5')   Wt 99.8 kg (220 lb)   LMP 07/06/2022   SpO2 97%   Breastfeeding Yes   BMI 42.97 kg/m       Cardiac Rhythm: NSR    Pain Score: 3/10    Is the Patient Confused:  No    Last Food or Drink: 5/30/23 at 2300    Focused Assessment:  Pts abdominal pain remains 3/10 today. Pt breastfeeding and has been pumping so pt does not want to use narcotics. Denies nausea, vomiting, diarrhea. Last oral intake 2300 on 5/30/23.      Tests Performed: Done: Labs and Imaging  Labs Ordered and Resulted from Time of ED Arrival to Time of ED Departure   HEPATIC FUNCTION PANEL - Abnormal       Result Value    Protein Total 6.5      Albumin 3.7      Bilirubin Total 0.7      Alkaline Phosphatase 216 (*)      (*)      (*)     Bilirubin Direct 0.32 (*)    BASIC METABOLIC PANEL - " Abnormal    Sodium 142      Potassium 3.4      Chloride 107      Carbon Dioxide (CO2) 25      Anion Gap 10      Urea Nitrogen 13.7      Creatinine 0.76      Calcium 9.1      Glucose 109 (*)     GFR Estimate >90     LIPASE - Abnormal    Lipase 2,218 (*)    CBC WITH PLATELETS AND DIFFERENTIAL - Abnormal    WBC Count 6.7      RBC Count 4.35      Hemoglobin 11.6 (*)     Hematocrit 36.8      MCV 85      MCH 26.7      MCHC 31.5      RDW 13.8      Platelet Count 261      % Neutrophils 69      % Lymphocytes 21      % Monocytes 6      % Eosinophils 3      % Basophils 1      % Immature Granulocytes 0      NRBCs per 100 WBC 0      Absolute Neutrophils 4.6      Absolute Lymphocytes 1.4      Absolute Monocytes 0.4      Absolute Eosinophils 0.2      Absolute Basophils 0.0      Absolute Immature Granulocytes 0.0      Absolute NRBCs 0.0     ROUTINE UA WITH MICROSCOPIC REFLEX TO CULTURE - Abnormal    Color Urine Yellow      Appearance Urine Clear      Glucose Urine Negative      Bilirubin Urine Negative      Ketones Urine Negative      Specific Gravity Urine 1.033 (*)     Blood Urine Negative      pH Urine 6.0      Protein Albumin Urine 20 (*)     Urobilinogen Urine <2.0      Nitrite Urine Negative      Leukocyte Esterase Urine 500 Lizet/uL (*)     Bacteria Urine Few (*)     Mucus Urine Present (*)     RBC Urine <1      WBC Urine 20 (*)     Squamous Epithelials Urine 1      Transitional Epithelials Urine <1     HEPATIC FUNCTION PANEL - Abnormal    Protein Total 6.3 (*)     Albumin 3.6      Bilirubin Total 0.8      Alkaline Phosphatase 194 (*)      (*)      (*)     Bilirubin Direct 0.24     TROPONIN T, HIGH SENSITIVITY - Normal    Troponin T, High Sensitivity <6     HCG QUALITATIVE URINE - Normal    hCG Urine Qualitative Negative     URINE CULTURE     Abdomen US, limited (RUQ only)   Final Result   IMPRESSION:   1.  Cholelithiasis with marked diffuse gallbladder wall thickening. Despite a negative sonographic Merino's  sign, findings could be seen with cholecystitis in the appropriate clinical setting. Clinical correlation.      2.  No biliary dilatation.      3.  Remainder of the right upper quadrant ultrasound is unremarkable.                  Family Dynamics/Concerns: No    Family Updated On Visitor Policy: Yes    Plan of Care Communicated to Family: Yes    Who Was Updated about Plan of Care:      Belongings Checklist Done and Signed by Patient: No     Medications sent with patient: na    Covid: asymptomatic       RN: Elke Tolbert RN   5/31/2023 2:26 PM

## 2023-05-31 NOTE — ED NOTES
Pt sleeping on cot. C/o abdominal pain 5/10. Pt remains NPO so IV meds offered. Pt given Zofran and wants to wait to see if that helps before taking narcotics.  at bedside.

## 2023-05-31 NOTE — CONSULTS
General Surgery Consultation  Amanda Miller MRN# [id number]   Age/Sex: 22 year old female YOB: 2000     Reason for consult: 1. Acute biliary pancreatitis without infection or necrosis            Requesting physician: Reinaldo Guzman MD                   Assessment and Plan:   Assessment:  1.  Gallstone pancreatitis  2.  Cholelithiasis  3.  Obesity    22-year-old female presenting with gallstone pancreatitis.  Patient is currently stable at this time with some mild ileus causing the abdominal distention.  I have discussed in detail regarding the treatment options for the patient.  We will have the patient undergo a cholecystectomy once the patient has improvement from her ileus and abdominal distention due to the pancreatitis.    Plan:  -Likely will try to have the patient undergo cholecystectomy either tomorrow or the day after depending on the resolution of her abdominal distention.  As explained to the patient, the abdominal distention would likely inhibit us performing a laparoscopic procedure.  As result we will allow her time to have resolution of this.  -Awaiting GI recommendations  -N.p.o. for now and continue with IV fluid resuscitation  -General surgery following with you.            Chief Complaint:     Chief Complaint   Patient presents with     Abdominal Pain        History is obtained from the patient    HPI:   Amanda Miller is a 22 year old female who presents to the ED with complaints of abdominal pain.  Patient was worked up and found to have gallstone pancreatitis.  General surgery team asked to evaluate this patient regarding the gallstone pancreatitis.    On general surgery team evaluation, the patient states that she has had this abdominal pain before.  She had it during pregnancy.  Since then she has not had any further surgical evaluation regarding this pain.  Patient states that this pain recently started.  It is located in the upper quadrants of the abdomen.  She is  "currently not nauseous or having any vomiting.  Patient does state that her pain is improving.  Patient does feel that her abdomen is distended.  Has not passed flatus or bowel movement yet.  No fevers or chills.  No chest pain or shortness of breath.  No further complaints at this time.          Past Medical History:     Past Medical History:   Diagnosis Date     Anxiety      Depression      Depressive disorder     no medications currently, per pt \"midwife would like me to start again after pregnancy\"     Obesity      Urinary tract infection               Past Surgical History:     Past Surgical History:   Procedure Laterality Date     TONSILLECTOMY       WISDOM TOOTH EXTRACTION               Social History:    reports that she has never smoked. She has never used smokeless tobacco. She reports that she does not currently use alcohol. She reports that she does not currently use drugs.           Family History:     Family History   Problem Relation Age of Onset     Depression Mother      Depression Father      Cancer Paternal Grandmother               Allergies:   No Known Allergies           Medications:     Prior to Admission medications    Medication Sig Start Date End Date Taking? Authorizing Provider   escitalopram (LEXAPRO) 10 MG tablet Take 1 tablet (10 mg) by mouth daily 5/2/23   Theresa Osuna APRN CNM   ibuprofen (ADVIL/MOTRIN) 600 MG tablet Take 1 tablet (600 mg) by mouth every 6 hours as needed for other (cramping) 5/1/23   Theresa Osuna APRN CNM   Prenatal Vit-Fe Fumarate-FA (PRENATAL VITAMIN PO)     Reported, Patient              Review of Systems:   The Review of Systems is negative other than noted in the HPI            Physical Exam:     Patient Vitals for the past 24 hrs:   BP Temp Temp src Pulse Resp SpO2 Height Weight   05/31/23 0658 -- -- -- 65 -- 99 % -- --   05/31/23 0537 -- -- -- 65 -- 97 % -- --   05/31/23 0427 109/57 -- -- 62 -- 98 % -- --   05/31/23 0417 101/51 -- -- 59 -- 99 % -- " --   05/31/23 0357 107/59 -- -- 63 -- 99 % -- --   05/31/23 0347 103/60 -- -- 62 -- 99 % -- --   05/31/23 0327 99/59 -- -- 65 -- 99 % -- --   05/31/23 0317 107/61 -- -- 81 -- 99 % -- --   05/31/23 0257 94/52 -- -- 70 -- 100 % -- --   05/31/23 0147 106/56 -- -- 64 -- 97 % -- --   05/31/23 0127 108/57 -- -- 60 -- 98 % -- --   05/31/23 0115 107/55 -- -- 63 -- 98 % -- --   05/31/23 0105 109/58 -- -- 62 -- 99 % -- --   05/31/23 0045 100/54 -- -- 68 -- 98 % -- --   05/31/23 0035 113/63 -- -- 75 -- 98 % -- --   05/31/23 0015 118/65 -- -- 62 -- 100 % -- --   05/31/23 0005 102/59 -- -- 65 -- 98 % -- --   05/30/23 2355 103/62 -- -- 68 -- 98 % -- --   05/30/23 2340 123/75 97.9  F (36.6  C) Temporal 67 18 97 % 1.524 m (5') 99.8 kg (220 lb)        No intake or output data in the 24 hours ending 05/31/23 0755   Constitutional:   awake, alert, cooperative, no apparent distress, and appears stated age       Eyes:   PERRL, conjunctiva/corneas clear, EOM's intact; no scleral edema or icterus noted        ENT:   Normocephalic, without obvious abnormality, atraumatic, Lips, mucosa, and tongue normal        Hematologic / Lymphatic:   No lymphadenopathy       Lungs:   Normal respiratory effort, no accessory muscle use       Cardiovascular:   Regular rate and rhythm       Abdomen:   Obese abdomen, tender to palpation in the upper quadrants of the abdomen.  Patient also has abdominal distention       Musculoskeletal:   No obvious swelling, bruising or deformity       Skin:   Skin color and texture normal for patient, no rashes or lesions              Data:        All imaging studies reviewed by me.  I personally reviewed the imagings and agree with gallstones and finding thickening of the gallbladder wall.      Dimas Cloud DO  General Surgeon  [facility]  [facility] - [address]  [place] [address]?  Office: [phone]  Employed by - [employer]  Pager:  [phone]

## 2023-05-31 NOTE — ED PROVIDER NOTES
EMERGENCY DEPARTMENT ENCOUNTER      NAME: Amanda Miller  AGE: 22 year old female  YOB: 2000  MRN: [id number]  EVALUATION DATE & TIME: 2023 11:44 PM    PCP: Jose Cloud    ED PROVIDER: Boom Lowe M.D.      Chief Complaint   Patient presents with     Abdominal Pain         FINAL IMPRESSION:  1. Acute biliary pancreatitis without infection or necrosis          ED COURSE & MEDICAL DECISION MAKIN year old female presents to the Emergency Department for evaluation of epigastric pain.  Patient is about 1 month postpartum from a vaginal delivery.  Presents with a 2-week history of intermittent upper abdominal pain, now more severe and radiating to her back.  Labs and imaging were obtained as below.  At this point labs are pointing to acute biliary pancreatitis with an elevated lipase to 2200, mildly elevated bilirubin and liver function tests.  Patient does not have a history of excessive alcohol intake.  Ultrasound of her right upper quadrant does show concern for cholecystitis although her biliary collecting system is not dilated.  Patient was started on Zosyn here.  She does not appear septic by vital signs or examination.  Will be admitted to the hospital on IV antibiotics for general surgery and GI consultations.  Discussed case with hospitalist.    12:20 AM I met with the patient, obtained history, performed an initial exam, and discussed options and plan for diagnostics and treatment here in the ED.  2:59 AM Checked in on and updated patient.      At the conclusion of the encounter I discussed the results of all of the tests and the disposition. The questions were answered. The patient or family acknowledged understanding and was agreeable with the care plan.       Medical Decision Making    History:    Supplemental history from: Documented in chart, if applicable    External Record(s) reviewed: Documented in chart, if applicable.    Work Up:    Chart documentation includes  differential considered and any EKGs or imaging independently interpreted by provider, where specified.    In additional to work up documented, I considered the following work up: Documented in chart, if applicable.    External consultation:    Discussion of management with another provider: Documented in chart, if applicable    Complicating factors:    Care impacted by chronic illness: N/A    Care affected by social determinants of health: N/A    Disposition considerations: Admit.            MEDICATIONS GIVEN IN THE EMERGENCY:  Medications   lidocaine 1 % 0.1-1 mL (has no administration in time range)   lidocaine (LMX4) cream (has no administration in time range)   sodium chloride (PF) 0.9% PF flush 3 mL (3 mLs Intracatheter Not Given 5/31/23 0404)   sodium chloride (PF) 0.9% PF flush 3 mL (has no administration in time range)   sodium chloride 0.9% infusion ( Intravenous $New Bag 5/31/23 0404)   HYDROmorphone (DILAUDID) injection 0.2 mg (has no administration in time range)   HYDROmorphone (DILAUDID) injection 0.4 mg (has no administration in time range)   ondansetron (ZOFRAN ODT) ODT tab 4 mg (has no administration in time range)     Or   ondansetron (ZOFRAN) injection 4 mg (has no administration in time range)   piperacillin-tazobactam (ZOSYN) 3.375 g vial to attach to  mL bag (has no administration in time range)   alum & mag hydroxide-simethicone (MAALOX) suspension 15 mL (15 mLs Oral $Given 5/31/23 0033)   famotidine (PEPCID) injection 20 mg (20 mg Intravenous $Given 5/31/23 0035)   ondansetron (ZOFRAN) injection 4 mg (4 mg Intravenous Not Given 5/31/23 0034)   morphine (PF) injection 4 mg (4 mg Intravenous Not Given 5/31/23 0320)   piperacillin-tazobactam (ZOSYN) 3.375 g vial to attach to  mL bag (3.375 g Intravenous $Given 5/31/23 0341)       NEW PRESCRIPTIONS STARTED AT TODAY'S ER VISIT  New Prescriptions    No medications on file       "    =================================================================    HPI    Patient information was obtained from: Patient     Use of : N/A         Amanda Miller is a 22 year old female with a pertinent history of 5 weeks postpartum and mental health diagnoses and obesity who presents to this ED by walking for evaluation of epigastric pain.     For the past two weeks patient has been having intermittent episodes of epigastric pain and pain between her shoulder blades. States that when symptoms initially started, they felt way worse; patient vomited several times then.     At present, patient states that epigastric pain and pain between her shoulder blades has remained constant all day. Endorses on and off nausea. Denies vomiting. Patient has taken ibuprofen and tylenol but states it has note helped alleviate any pain.    Otherwise patient denies fever, cough, hematuria, dysuria, vaginal bleeding or discharge, or any other complaints at this time. Patient is currently breastfeeding and denies any lumps or redness over her bilateral breast.       REVIEW OF SYSTEMS   All systems reviewed and negative except as noted in HPI.    PAST MEDICAL HISTORY:  Past Medical History:   Diagnosis Date     Anxiety      Depression      Depressive disorder     no medications currently, per pt \"midwife would like me to start again after pregnancy\"     Obesity      Urinary tract infection        PAST SURGICAL HISTORY:  Past Surgical History:   Procedure Laterality Date     TONSILLECTOMY       WISDOM TOOTH EXTRACTION             CURRENT MEDICATIONS:    Current Facility-Administered Medications   Medication     HYDROmorphone (DILAUDID) injection 0.2 mg     HYDROmorphone (DILAUDID) injection 0.4 mg     lidocaine (LMX4) cream     lidocaine 1 % 0.1-1 mL     ondansetron (ZOFRAN ODT) ODT tab 4 mg    Or     ondansetron (ZOFRAN) injection 4 mg     piperacillin-tazobactam (ZOSYN) 3.375 g vial to attach to  mL bag     sodium " chloride (PF) 0.9% PF flush 3 mL     sodium chloride (PF) 0.9% PF flush 3 mL     sodium chloride 0.9% infusion     Current Outpatient Medications   Medication     escitalopram (LEXAPRO) 10 MG tablet     ibuprofen (ADVIL/MOTRIN) 600 MG tablet     Prenatal Vit-Fe Fumarate-FA (PRENATAL VITAMIN PO)         ALLERGIES:  No Known Allergies    FAMILY HISTORY:  Family History   Problem Relation Age of Onset     Depression Mother      Depression Father      Cancer Paternal Grandmother        SOCIAL HISTORY:   Social History     Socioeconomic History     Marital status:      Spouse name: Roslyn     Number of children: 1     Highest education level: Some college, no degree   Tobacco Use     Smoking status: Never     Smokeless tobacco: Never   Vaping Use     Vaping status: Never Used   Substance and Sexual Activity     Alcohol use: Not Currently     Drug use: Not Currently     Sexual activity: Yes     Partners: Male     Birth control/protection: None       VITALS:  /57   Pulse 62   Temp 97.9  F (36.6  C) (Temporal)   Resp 18   Ht 1.524 m (5')   Wt 99.8 kg (220 lb)   LMP 07/06/2022   SpO2 98%   Breastfeeding Yes   BMI 42.97 kg/m      PHYSICAL EXAM    Constitutional: Well developed, Well nourished, NAD.  HENT: Normocephalic, Atraumatic. Neck Supple.  Eyes: EOMI, Conjunctiva normal.  Respiratory: Breathing comfortably on room air. Speaks full sentences easily. Lungs clear to ascultation.  Cardiovascular: Normal heart rate, Regular rhythm. No peripheral edema.  Abdomen: Soft, moderate midepigastric and right upper quadrant tenderness to deep palpation.  Musculoskeletal: Good range of motion in all major joints. No major deformities noted.  Integument: Warm, Dry.  Neurologic: Alert & awake, Normal motor function, Normal sensory function, No focal deficits noted.   Psychiatric: Cooperative. Affect appropriate.     LAB:  All pertinent labs reviewed and interpreted.  Labs Ordered and Resulted from Time of ED  Arrival to Time of ED Departure   HEPATIC FUNCTION PANEL - Abnormal       Result Value    Protein Total 6.5      Albumin 3.7      Bilirubin Total 0.7      Alkaline Phosphatase 216 (*)      (*)      (*)     Bilirubin Direct 0.32 (*)    BASIC METABOLIC PANEL - Abnormal    Sodium 142      Potassium 3.4      Chloride 107      Carbon Dioxide (CO2) 25      Anion Gap 10      Urea Nitrogen 13.7      Creatinine 0.76      Calcium 9.1      Glucose 109 (*)     GFR Estimate >90     LIPASE - Abnormal    Lipase 2,218 (*)    CBC WITH PLATELETS AND DIFFERENTIAL - Abnormal    WBC Count 6.7      RBC Count 4.35      Hemoglobin 11.6 (*)     Hematocrit 36.8      MCV 85      MCH 26.7      MCHC 31.5      RDW 13.8      Platelet Count 261      % Neutrophils 69      % Lymphocytes 21      % Monocytes 6      % Eosinophils 3      % Basophils 1      % Immature Granulocytes 0      NRBCs per 100 WBC 0      Absolute Neutrophils 4.6      Absolute Lymphocytes 1.4      Absolute Monocytes 0.4      Absolute Eosinophils 0.2      Absolute Basophils 0.0      Absolute Immature Granulocytes 0.0      Absolute NRBCs 0.0     TROPONIN T, HIGH SENSITIVITY - Normal    Troponin T, High Sensitivity <6     ROUTINE UA WITH MICROSCOPIC REFLEX TO CULTURE   HCG QUALITATIVE URINE       RADIOLOGY:  Reviewed all pertinent imaging. Please see official radiology report.  Abdomen US, limited (RUQ only)   Final Result   IMPRESSION:   1.  Cholelithiasis with marked diffuse gallbladder wall thickening. Despite a negative sonographic Merino's sign, findings could be seen with cholecystitis in the appropriate clinical setting. Clinical correlation.      2.  No biliary dilatation.      3.  Remainder of the right upper quadrant ultrasound is unremarkable.                EKG:    Performed at: 31-MAY-2023 (00:29:43)     Impression: Sinus bradycardia with short NY, otherwise normal ECG     Rate: 59 BPM   Rhythm: Sinus bradycardia   Axis: 4  74  53   NY Interval: 108 ms    QRS Interval: 88 ms   QTc Interval: 411 ms   ST Changes: NA   Comparison: No previous ECGs     I have independently reviewed and interpreted the EKG(s) documented above.      I, Jazzmine Krishnan, am serving as a scribe to document services personally performed by Dr. Boom Lowe, based on my observation and the provider's statements to me. I, Boom Lowe MD attest that Jazzmine Krishnan is acting in a scribe capacity, has observed my performance of the services and has documented them in accordance with my direction.    Boom Lowe M.D.  Emergency Medicine  [facility] EMERGENCY DEPARTMENT  [address] [place] [address] [id number] [phone]  Dept: [phone]     Boom Lowe MD  05/31/23 0444

## 2023-05-31 NOTE — PROGRESS NOTES
[facility]    PROGRESS NOTE - Hospitalist Service  patient seen and examined, chart is reviewed  Patient is admitted early this morning by , please refer to H&P for details.  22 years old female who is 5 weeks s/p normal spontaneous vaginal delivery who presented to ER for nausea and vomiting with abdominal pain and found to have acute pancreatitis secondary to cholelithiasis  GI and surgery consult, appreciate input  Keep patient n.p.o., continue IV fluid and pain control  Continue to monitor LFTs.  Medication reconciliation is done.      Og Bustillos MD  [facility] Medicine Service  [phone]

## 2023-05-31 NOTE — H&P
[facility]    History and Physical - Hospitalist Service       Date of Admission:  2023    Assessment & Plan      Amanda Miller is a 22 year old female admitted on 2023.     Principal Problem:    Acute biliary pancreatitis without infection or necrosis  Active Problems:    Cholelithiasis    Cholecystitis  Plan: Admit, IV fluids, n.p.o., IV Zosyn, GI and general surgery consult.        Diet: NPO per Anesthesia Guidelines for Procedure/Surgery Except for: Meds    DVT Prophylaxis: Low Risk/Ambulatory with no VTE prophylaxis indicated  Keys Catheter: Not present  Lines: None     Cardiac Monitoring: None  Code Status: Full Code      Clinically Significant Risk Factors Present on Admission                       # Severe Obesity: Estimated body mass index is 42.97 kg/m  as calculated from the following:    Height as of this encounter: 1.524 m (5').    Weight as of this encounter: 99.8 kg (220 lb).            Disposition Plan      Expected Discharge Date: 2023                  Reinaldo Guzman MD  Hospitalist Service  [facility]  Securely message with [employer] (more info)  Text page via [employer] Paging/Directory     ______________________________________________________________________    Chief Complaint   Abdominal and back pain    History is obtained from the patient and ED provider    History of Present Illness   Amanda Miller is a 22 year old female who is 5 weeks status post  of a healthy female, developed intermittent epigastric abdominal pain on and off for the past 2 weeks.  Sometimes associated with eating.  She was initially nauseated and vomited 1 time but has not vomited since.  The pain today however increased.  It does radiate into her back.  She denies any change in bowel habits no fever chills or respiratory symptoms.  She came to the ED where white blood count was 6700 hemoglobin is 11.6 basic metabolic panel was normal hepatic  "function panel showed alk phosphatase 216   direct bili 0.32 lipase was 2,218.  Right upper quadrant abdominal ultrasound demonstrated cholelithiasis with gallbladder wall thickening consistent with cholecystitis although the sonographic Merino sign was negative.  No biliary dilatation was seen.  Patient was started on IV Zosyn, she has not needed pain medication yet, and will now be admitted for further management.      Past Medical History    Past Medical History:   Diagnosis Date     Anxiety      Depression      Depressive disorder     no medications currently, per pt \"midwife would like me to start again after pregnancy\"     Obesity      Urinary tract infection        Past Surgical History   Past Surgical History:   Procedure Laterality Date     TONSILLECTOMY       WISDOM TOOTH EXTRACTION         Prior to Admission Medications   Prior to Admission Medications   Prescriptions Last Dose Informant Patient Reported? Taking?   Prenatal Vit-Fe Fumarate-FA (PRENATAL VITAMIN PO)   Yes No   escitalopram (LEXAPRO) 10 MG tablet   No No   Sig: Take 1 tablet (10 mg) by mouth daily   ibuprofen (ADVIL/MOTRIN) 600 MG tablet   No No   Sig: Take 1 tablet (600 mg) by mouth every 6 hours as needed for other (cramping)      Facility-Administered Medications: None        CONSTITUTIONAL: NEGATIVE for fever, chills, change in weight  INTEGUMENTARY/SKIN: NEGATIVE for worrisome rashes, moles or lesions  EYES: NEGATIVE for vision changes or irritation  ENT/MOUTH: NEGATIVE for ear, mouth and throat problems  RESP: NEGATIVE for cough or shortness of breath  BREAST: NEGATIVE for masses, tenderness or discharge.  Patient is  breast-feeding and is currently pumping.  CV: NEGATIVE for  chest pain,palpitations or peripheral edema.    GI: Please see HPI  : NEGATIVE for frequency, dysuria, or hematuria  MUSCULOSKELETAL: NEGATIVE for significant arthralgias or myalgia  NEURO: NEGATIVE for weakness, dizziness or " paresthesias  ENDOCRINE: NEGATIVE for temperature intolerance, skin/hair changes  HEME: NEGATIVE for bleeding problems  PSYCHIATRIC: NEGATIVE for changes in mood or affect     Physical Exam   Vital Signs: Temp: 97.9  F (36.6  C) Temp src: Temporal BP: 103/60 Pulse: 62   Resp: 18 SpO2: 99 % O2 Device: None (Room air)    Weight: 220 lbs 0 oz    General Appearance: A,O x3 NAD  Eyes: Sclera nonicteric  HEENT: NCAT  Respiratory: Clear to auscultation  Cardiovascular: Regular rate and rhythm no murmur no JVD nor edema  GI: Abdomen soft mild bilateral upper quadrant tenderness no organomegaly or masses  Lymph/Hematologic: Nonpalpable no significant bruising  Skin: Grossly normal no lesions seen  Musculoskeletal: Good range of motion  Neurologic: Alert oriented x3 speech and thought processes intact 5/ 5 muscle strength 2/4 DTRs  Psychiatric: Mood appropriate affect is full     Medical Decision Making           Data

## 2023-05-31 NOTE — ED TRIAGE NOTES
Patient arrives from home.   C/o pain behind shoulder blades and upper abdominal area that has been ongoing for two weeks.     Five week postpartum and is concerned about ongoing pain.     Took Tylenol and Ibuprofen today but unsure of time.

## 2023-06-01 ENCOUNTER — ANESTHESIA EVENT (OUTPATIENT)
Dept: SURGERY | Facility: HOSPITAL | Age: 23
End: 2023-06-01
Payer: COMMERCIAL

## 2023-06-01 ENCOUNTER — ANESTHESIA (OUTPATIENT)
Dept: SURGERY | Facility: HOSPITAL | Age: 23
End: 2023-06-01
Payer: COMMERCIAL

## 2023-06-01 ENCOUNTER — APPOINTMENT (OUTPATIENT)
Dept: RADIOLOGY | Facility: HOSPITAL | Age: 23
End: 2023-06-01
Attending: SURGERY
Payer: COMMERCIAL

## 2023-06-01 ENCOUNTER — MEDICAL CORRESPONDENCE (OUTPATIENT)
Dept: HEALTH INFORMATION MANAGEMENT | Facility: CLINIC | Age: 23
End: 2023-06-01
Payer: COMMERCIAL

## 2023-06-01 LAB
ALBUMIN SERPL BCG-MCNC: 3.7 G/DL (ref 3.5–5.2)
ALP SERPL-CCNC: 202 U/L (ref 35–104)
ALT SERPL W P-5'-P-CCNC: 164 U/L (ref 10–35)
ANION GAP SERPL CALCULATED.3IONS-SCNC: 15 MMOL/L (ref 7–15)
AST SERPL W P-5'-P-CCNC: 63 U/L (ref 10–35)
BACTERIA UR CULT: NORMAL
BILIRUB DIRECT SERPL-MCNC: <0.2 MG/DL (ref 0–0.3)
BILIRUB SERPL-MCNC: 0.7 MG/DL
BUN SERPL-MCNC: 8.3 MG/DL (ref 6–20)
CALCIUM SERPL-MCNC: 9.3 MG/DL (ref 8.6–10)
CHLORIDE SERPL-SCNC: 106 MMOL/L (ref 98–107)
CREAT SERPL-MCNC: 0.63 MG/DL (ref 0.51–0.95)
DEPRECATED HCO3 PLAS-SCNC: 21 MMOL/L (ref 22–29)
ERYTHROCYTE [DISTWIDTH] IN BLOOD BY AUTOMATED COUNT: 13.8 % (ref 10–15)
GFR SERPL CREATININE-BSD FRML MDRD: >90 ML/MIN/1.73M2
GLUCOSE SERPL-MCNC: 75 MG/DL (ref 70–99)
HCT VFR BLD AUTO: 39.8 % (ref 35–47)
HGB BLD-MCNC: 12.4 G/DL (ref 11.7–15.7)
HOLD SPECIMEN: NORMAL
INR PPP: 1.01 (ref 0.85–1.15)
LIPASE SERPL-CCNC: 163 U/L (ref 13–60)
MCH RBC QN AUTO: 26.8 PG (ref 26.5–33)
MCHC RBC AUTO-ENTMCNC: 31.2 G/DL (ref 31.5–36.5)
MCV RBC AUTO: 86 FL (ref 78–100)
PLATELET # BLD AUTO: 274 10E3/UL (ref 150–450)
PLATELET # BLD AUTO: 276 10E3/UL (ref 150–450)
POTASSIUM SERPL-SCNC: 4.2 MMOL/L (ref 3.4–5.3)
PROT SERPL-MCNC: 6.8 G/DL (ref 6.4–8.3)
RBC # BLD AUTO: 4.63 10E6/UL (ref 3.8–5.2)
SODIUM SERPL-SCNC: 142 MMOL/L (ref 136–145)
WBC # BLD AUTO: 8.7 10E3/UL (ref 4–11)

## 2023-06-01 PROCEDURE — 99231 SBSQ HOSP IP/OBS SF/LOW 25: CPT | Mod: 57 | Performed by: SURGERY

## 2023-06-01 PROCEDURE — 80053 COMPREHEN METABOLIC PANEL: CPT | Performed by: INTERNAL MEDICINE

## 2023-06-01 PROCEDURE — 255N000002 HC RX 255 OP 636: Performed by: SURGERY

## 2023-06-01 PROCEDURE — 250N000009 HC RX 250: Performed by: NURSE ANESTHETIST, CERTIFIED REGISTERED

## 2023-06-01 PROCEDURE — 250N000013 HC RX MED GY IP 250 OP 250 PS 637: Performed by: INTERNAL MEDICINE

## 2023-06-01 PROCEDURE — 258N000003 HC RX IP 258 OP 636: Performed by: SURGERY

## 2023-06-01 PROCEDURE — 96366 THER/PROPH/DIAG IV INF ADDON: CPT

## 2023-06-01 PROCEDURE — 250N000011 HC RX IP 250 OP 636: Performed by: NURSE ANESTHETIST, CERTIFIED REGISTERED

## 2023-06-01 PROCEDURE — 250N000011 HC RX IP 250 OP 636: Performed by: FAMILY MEDICINE

## 2023-06-01 PROCEDURE — 47563 LAPARO CHOLECYSTECTOMY/GRAPH: CPT | Performed by: SURGERY

## 2023-06-01 PROCEDURE — 85027 COMPLETE CBC AUTOMATED: CPT | Performed by: INTERNAL MEDICINE

## 2023-06-01 PROCEDURE — 250N000025 HC SEVOFLURANE, PER MIN: Performed by: SURGERY

## 2023-06-01 PROCEDURE — 8E0W4CZ ROBOTIC ASSISTED PROCEDURE OF TRUNK REGION, PERCUTANEOUS ENDOSCOPIC APPROACH: ICD-10-PCS | Performed by: SURGERY

## 2023-06-01 PROCEDURE — 258N000003 HC RX IP 258 OP 636: Performed by: FAMILY MEDICINE

## 2023-06-01 PROCEDURE — 258N000001 HC RX 258: Performed by: SURGERY

## 2023-06-01 PROCEDURE — 82248 BILIRUBIN DIRECT: CPT | Performed by: INTERNAL MEDICINE

## 2023-06-01 PROCEDURE — 999N000180 XR SURGERY CARM FLUORO LESS THAN 5 MIN

## 2023-06-01 PROCEDURE — 999N000141 HC STATISTIC PRE-PROCEDURE NURSING ASSESSMENT: Performed by: SURGERY

## 2023-06-01 PROCEDURE — 88304 TISSUE EXAM BY PATHOLOGIST: CPT | Mod: TC | Performed by: SURGERY

## 2023-06-01 PROCEDURE — S2900 ROBOTIC SURGICAL SYSTEM: HCPCS | Performed by: SURGERY

## 2023-06-01 PROCEDURE — C9113 INJ PANTOPRAZOLE SODIUM, VIA: HCPCS | Performed by: INTERNAL MEDICINE

## 2023-06-01 PROCEDURE — 250N000011 HC RX IP 250 OP 636: Performed by: INTERNAL MEDICINE

## 2023-06-01 PROCEDURE — 710N000009 HC RECOVERY PHASE 1, LEVEL 1, PER MIN: Performed by: SURGERY

## 2023-06-01 PROCEDURE — 250N000013 HC RX MED GY IP 250 OP 250 PS 637: Performed by: SURGERY

## 2023-06-01 PROCEDURE — 258N000003 HC RX IP 258 OP 636: Performed by: ANESTHESIOLOGY

## 2023-06-01 PROCEDURE — 120N000001 HC R&B MED SURG/OB

## 2023-06-01 PROCEDURE — 360N000087 HC SURGERY LEVEL 7 W/ FLUORO, PER MIN: Performed by: SURGERY

## 2023-06-01 PROCEDURE — 83690 ASSAY OF LIPASE: CPT | Performed by: INTERNAL MEDICINE

## 2023-06-01 PROCEDURE — 250N000009 HC RX 250: Performed by: SURGERY

## 2023-06-01 PROCEDURE — 370N000017 HC ANESTHESIA TECHNICAL FEE, PER MIN: Performed by: SURGERY

## 2023-06-01 PROCEDURE — 85610 PROTHROMBIN TIME: CPT | Performed by: INTERNAL MEDICINE

## 2023-06-01 PROCEDURE — 272N000001 HC OR GENERAL SUPPLY STERILE: Performed by: SURGERY

## 2023-06-01 PROCEDURE — G0378 HOSPITAL OBSERVATION PER HR: HCPCS

## 2023-06-01 PROCEDURE — 250N000011 HC RX IP 250 OP 636: Performed by: SURGERY

## 2023-06-01 PROCEDURE — 96376 TX/PRO/DX INJ SAME DRUG ADON: CPT | Mod: 59

## 2023-06-01 PROCEDURE — BF121ZZ FLUOROSCOPY OF GALLBLADDER USING LOW OSMOLAR CONTRAST: ICD-10-PCS | Performed by: SURGERY

## 2023-06-01 PROCEDURE — 36415 COLL VENOUS BLD VENIPUNCTURE: CPT | Performed by: INTERNAL MEDICINE

## 2023-06-01 PROCEDURE — 85049 AUTOMATED PLATELET COUNT: CPT | Mod: 91 | Performed by: SURGERY

## 2023-06-01 PROCEDURE — 36415 COLL VENOUS BLD VENIPUNCTURE: CPT | Performed by: SURGERY

## 2023-06-01 PROCEDURE — 96361 HYDRATE IV INFUSION ADD-ON: CPT | Mod: 59

## 2023-06-01 PROCEDURE — 0FT44ZZ RESECTION OF GALLBLADDER, PERCUTANEOUS ENDOSCOPIC APPROACH: ICD-10-PCS | Performed by: SURGERY

## 2023-06-01 PROCEDURE — 99233 SBSQ HOSP IP/OBS HIGH 50: CPT | Performed by: INTERNAL MEDICINE

## 2023-06-01 RX ORDER — ONDANSETRON 4 MG/1
4 TABLET, ORALLY DISINTEGRATING ORAL EVERY 6 HOURS PRN
Status: DISCONTINUED | OUTPATIENT
Start: 2023-06-01 | End: 2023-06-02 | Stop reason: HOSPADM

## 2023-06-01 RX ORDER — KETOROLAC TROMETHAMINE 30 MG/ML
INJECTION, SOLUTION INTRAMUSCULAR; INTRAVENOUS PRN
Status: DISCONTINUED | OUTPATIENT
Start: 2023-06-01 | End: 2023-06-01

## 2023-06-01 RX ORDER — FENTANYL CITRATE 50 UG/ML
50 INJECTION, SOLUTION INTRAMUSCULAR; INTRAVENOUS EVERY 5 MIN PRN
Status: DISCONTINUED | OUTPATIENT
Start: 2023-06-01 | End: 2023-06-01 | Stop reason: HOSPADM

## 2023-06-01 RX ORDER — HYDROMORPHONE HCL IN WATER/PF 6 MG/30 ML
0.2 PATIENT CONTROLLED ANALGESIA SYRINGE INTRAVENOUS
Status: DISCONTINUED | OUTPATIENT
Start: 2023-06-01 | End: 2023-06-02 | Stop reason: HOSPADM

## 2023-06-01 RX ORDER — PROPOFOL 10 MG/ML
INJECTION, EMULSION INTRAVENOUS PRN
Status: DISCONTINUED | OUTPATIENT
Start: 2023-06-01 | End: 2023-06-01

## 2023-06-01 RX ORDER — INDOCYANINE GREEN AND WATER 25 MG
2.5 KIT INJECTION ONCE
Status: COMPLETED | OUTPATIENT
Start: 2023-06-01 | End: 2023-06-01

## 2023-06-01 RX ORDER — ONDANSETRON 2 MG/ML
4 INJECTION INTRAMUSCULAR; INTRAVENOUS EVERY 30 MIN PRN
Status: DISCONTINUED | OUTPATIENT
Start: 2023-06-01 | End: 2023-06-01 | Stop reason: HOSPADM

## 2023-06-01 RX ORDER — ONDANSETRON 2 MG/ML
INJECTION INTRAMUSCULAR; INTRAVENOUS PRN
Status: DISCONTINUED | OUTPATIENT
Start: 2023-06-01 | End: 2023-06-01

## 2023-06-01 RX ORDER — IBUPROFEN 600 MG/1
600 TABLET, FILM COATED ORAL ONCE
Status: COMPLETED | OUTPATIENT
Start: 2023-06-01 | End: 2023-06-01

## 2023-06-01 RX ORDER — LIDOCAINE HYDROCHLORIDE AND EPINEPHRINE 10; 10 MG/ML; UG/ML
INJECTION, SOLUTION INFILTRATION; PERINEURAL PRN
Status: DISCONTINUED | OUTPATIENT
Start: 2023-06-01 | End: 2023-06-01 | Stop reason: HOSPADM

## 2023-06-01 RX ORDER — HEPARIN SODIUM 5000 [USP'U]/.5ML
5000 INJECTION, SOLUTION INTRAVENOUS; SUBCUTANEOUS
Status: COMPLETED | OUTPATIENT
Start: 2023-06-01 | End: 2023-06-01

## 2023-06-01 RX ORDER — OXYCODONE HYDROCHLORIDE 5 MG/1
10 TABLET ORAL EVERY 4 HOURS PRN
Status: DISCONTINUED | OUTPATIENT
Start: 2023-06-01 | End: 2023-06-02 | Stop reason: HOSPADM

## 2023-06-01 RX ORDER — CEFAZOLIN SODIUM/WATER 2 G/20 ML
2 SYRINGE (ML) INTRAVENOUS SEE ADMIN INSTRUCTIONS
Status: DISCONTINUED | OUTPATIENT
Start: 2023-06-01 | End: 2023-06-01 | Stop reason: HOSPADM

## 2023-06-01 RX ORDER — LIDOCAINE 40 MG/G
CREAM TOPICAL
Status: DISCONTINUED | OUTPATIENT
Start: 2023-06-01 | End: 2023-06-02 | Stop reason: HOSPADM

## 2023-06-01 RX ORDER — ACETAMINOPHEN 325 MG/1
650 TABLET ORAL EVERY 4 HOURS PRN
Status: DISCONTINUED | OUTPATIENT
Start: 2023-06-04 | End: 2023-06-02 | Stop reason: HOSPADM

## 2023-06-01 RX ORDER — OXYCODONE HYDROCHLORIDE 5 MG/1
5 TABLET ORAL EVERY 4 HOURS PRN
Status: DISCONTINUED | OUTPATIENT
Start: 2023-06-01 | End: 2023-06-02 | Stop reason: HOSPADM

## 2023-06-01 RX ORDER — SODIUM CHLORIDE, SODIUM LACTATE, POTASSIUM CHLORIDE, CALCIUM CHLORIDE 600; 310; 30; 20 MG/100ML; MG/100ML; MG/100ML; MG/100ML
INJECTION, SOLUTION INTRAVENOUS CONTINUOUS
Status: DISCONTINUED | OUTPATIENT
Start: 2023-06-01 | End: 2023-06-01 | Stop reason: HOSPADM

## 2023-06-01 RX ORDER — HYDROMORPHONE HCL IN WATER/PF 6 MG/30 ML
0.4 PATIENT CONTROLLED ANALGESIA SYRINGE INTRAVENOUS
Status: DISCONTINUED | OUTPATIENT
Start: 2023-06-01 | End: 2023-06-02 | Stop reason: HOSPADM

## 2023-06-01 RX ORDER — POLYETHYLENE GLYCOL 3350 17 G/17G
17 POWDER, FOR SOLUTION ORAL DAILY
Status: DISCONTINUED | OUTPATIENT
Start: 2023-06-02 | End: 2023-06-02 | Stop reason: HOSPADM

## 2023-06-01 RX ORDER — ONDANSETRON 4 MG/1
4 TABLET, ORALLY DISINTEGRATING ORAL EVERY 30 MIN PRN
Status: DISCONTINUED | OUTPATIENT
Start: 2023-06-01 | End: 2023-06-01 | Stop reason: HOSPADM

## 2023-06-01 RX ORDER — HYDROMORPHONE HYDROCHLORIDE 1 MG/ML
0.2 INJECTION, SOLUTION INTRAMUSCULAR; INTRAVENOUS; SUBCUTANEOUS EVERY 5 MIN PRN
Status: DISCONTINUED | OUTPATIENT
Start: 2023-06-01 | End: 2023-06-01 | Stop reason: HOSPADM

## 2023-06-01 RX ORDER — LIDOCAINE HYDROCHLORIDE 10 MG/ML
INJECTION, SOLUTION INFILTRATION; PERINEURAL PRN
Status: DISCONTINUED | OUTPATIENT
Start: 2023-06-01 | End: 2023-06-01

## 2023-06-01 RX ORDER — LIDOCAINE 40 MG/G
CREAM TOPICAL
Status: DISCONTINUED | OUTPATIENT
Start: 2023-06-01 | End: 2023-06-01 | Stop reason: HOSPADM

## 2023-06-01 RX ORDER — OXYCODONE HYDROCHLORIDE 5 MG/1
5 TABLET ORAL
Status: DISCONTINUED | OUTPATIENT
Start: 2023-06-01 | End: 2023-06-01 | Stop reason: HOSPADM

## 2023-06-01 RX ORDER — ONDANSETRON 2 MG/ML
4 INJECTION INTRAMUSCULAR; INTRAVENOUS EVERY 6 HOURS PRN
Status: DISCONTINUED | OUTPATIENT
Start: 2023-06-01 | End: 2023-06-02 | Stop reason: HOSPADM

## 2023-06-01 RX ORDER — ACETAMINOPHEN 325 MG/1
975 TABLET ORAL EVERY 8 HOURS
Status: DISCONTINUED | OUTPATIENT
Start: 2023-06-01 | End: 2023-06-02 | Stop reason: HOSPADM

## 2023-06-01 RX ORDER — HYDROMORPHONE HYDROCHLORIDE 1 MG/ML
0.4 INJECTION, SOLUTION INTRAMUSCULAR; INTRAVENOUS; SUBCUTANEOUS EVERY 5 MIN PRN
Status: DISCONTINUED | OUTPATIENT
Start: 2023-06-01 | End: 2023-06-01 | Stop reason: HOSPADM

## 2023-06-01 RX ORDER — HEPARIN SODIUM 5000 [USP'U]/.5ML
5000 INJECTION, SOLUTION INTRAVENOUS; SUBCUTANEOUS EVERY 8 HOURS
Status: DISCONTINUED | OUTPATIENT
Start: 2023-06-02 | End: 2023-06-02 | Stop reason: HOSPADM

## 2023-06-01 RX ORDER — OXYCODONE HYDROCHLORIDE 5 MG/1
10 TABLET ORAL
Status: DISCONTINUED | OUTPATIENT
Start: 2023-06-01 | End: 2023-06-01 | Stop reason: HOSPADM

## 2023-06-01 RX ORDER — FENTANYL CITRATE 50 UG/ML
INJECTION, SOLUTION INTRAMUSCULAR; INTRAVENOUS PRN
Status: DISCONTINUED | OUTPATIENT
Start: 2023-06-01 | End: 2023-06-01

## 2023-06-01 RX ORDER — PROPOFOL 10 MG/ML
INJECTION, EMULSION INTRAVENOUS CONTINUOUS PRN
Status: DISCONTINUED | OUTPATIENT
Start: 2023-06-01 | End: 2023-06-01

## 2023-06-01 RX ORDER — AMOXICILLIN 250 MG
1 CAPSULE ORAL 2 TIMES DAILY
Status: DISCONTINUED | OUTPATIENT
Start: 2023-06-01 | End: 2023-06-02 | Stop reason: HOSPADM

## 2023-06-01 RX ORDER — PROCHLORPERAZINE MALEATE 10 MG
10 TABLET ORAL EVERY 6 HOURS PRN
Status: DISCONTINUED | OUTPATIENT
Start: 2023-06-01 | End: 2023-06-02 | Stop reason: HOSPADM

## 2023-06-01 RX ORDER — DEXAMETHASONE SODIUM PHOSPHATE 10 MG/ML
INJECTION, SOLUTION INTRAMUSCULAR; INTRAVENOUS PRN
Status: DISCONTINUED | OUTPATIENT
Start: 2023-06-01 | End: 2023-06-01

## 2023-06-01 RX ORDER — BISACODYL 10 MG
10 SUPPOSITORY, RECTAL RECTAL DAILY PRN
Status: DISCONTINUED | OUTPATIENT
Start: 2023-06-01 | End: 2023-06-02 | Stop reason: HOSPADM

## 2023-06-01 RX ORDER — FENTANYL CITRATE 50 UG/ML
25 INJECTION, SOLUTION INTRAMUSCULAR; INTRAVENOUS EVERY 5 MIN PRN
Status: DISCONTINUED | OUTPATIENT
Start: 2023-06-01 | End: 2023-06-01 | Stop reason: HOSPADM

## 2023-06-01 RX ORDER — CEFAZOLIN SODIUM/WATER 2 G/20 ML
2 SYRINGE (ML) INTRAVENOUS
Status: DISCONTINUED | OUTPATIENT
Start: 2023-06-01 | End: 2023-06-01 | Stop reason: HOSPADM

## 2023-06-01 RX ADMIN — ROCURONIUM BROMIDE 50 MG: 50 INJECTION, SOLUTION INTRAVENOUS at 14:39

## 2023-06-01 RX ADMIN — HYDROMORPHONE HYDROCHLORIDE 1 MG: 1 INJECTION, SOLUTION INTRAMUSCULAR; INTRAVENOUS; SUBCUTANEOUS at 15:02

## 2023-06-01 RX ADMIN — PROPOFOL 200 MCG/KG/MIN: 10 INJECTION, EMULSION INTRAVENOUS at 14:39

## 2023-06-01 RX ADMIN — ONDANSETRON 2 MG: 2 INJECTION INTRAMUSCULAR; INTRAVENOUS at 16:26

## 2023-06-01 RX ADMIN — IBUPROFEN 600 MG: 600 TABLET, FILM COATED ORAL at 23:34

## 2023-06-01 RX ADMIN — ACETAMINOPHEN 975 MG: 325 TABLET ORAL at 20:05

## 2023-06-01 RX ADMIN — SODIUM CHLORIDE: 9 INJECTION, SOLUTION INTRAVENOUS at 00:13

## 2023-06-01 RX ADMIN — SODIUM CHLORIDE, POTASSIUM CHLORIDE, SODIUM LACTATE AND CALCIUM CHLORIDE: 600; 310; 30; 20 INJECTION, SOLUTION INTRAVENOUS at 14:28

## 2023-06-01 RX ADMIN — PROPOFOL 200 MG: 10 INJECTION, EMULSION INTRAVENOUS at 14:39

## 2023-06-01 RX ADMIN — PIPERACILLIN AND TAZOBACTAM 3.38 G: 3; .375 INJECTION, POWDER, LYOPHILIZED, FOR SOLUTION INTRAVENOUS at 08:34

## 2023-06-01 RX ADMIN — MIDAZOLAM 2 MG: 1 INJECTION INTRAMUSCULAR; INTRAVENOUS at 14:36

## 2023-06-01 RX ADMIN — FENTANYL CITRATE 100 MCG: 50 INJECTION, SOLUTION INTRAMUSCULAR; INTRAVENOUS at 14:39

## 2023-06-01 RX ADMIN — SODIUM CHLORIDE: 9 INJECTION, SOLUTION INTRAVENOUS at 23:35

## 2023-06-01 RX ADMIN — DEXAMETHASONE SODIUM PHOSPHATE 10 MG: 10 INJECTION, SOLUTION INTRAMUSCULAR; INTRAVENOUS at 14:53

## 2023-06-01 RX ADMIN — SENNOSIDES AND DOCUSATE SODIUM 1 TABLET: 50; 8.6 TABLET ORAL at 23:33

## 2023-06-01 RX ADMIN — SODIUM CHLORIDE: 9 INJECTION, SOLUTION INTRAVENOUS at 08:45

## 2023-06-01 RX ADMIN — PANTOPRAZOLE SODIUM 40 MG: 40 INJECTION, POWDER, FOR SOLUTION INTRAVENOUS at 08:34

## 2023-06-01 RX ADMIN — KETOROLAC TROMETHAMINE 15 MG: 30 INJECTION, SOLUTION INTRAMUSCULAR at 16:29

## 2023-06-01 RX ADMIN — ONDANSETRON 2 MG: 2 INJECTION INTRAMUSCULAR; INTRAVENOUS at 14:40

## 2023-06-01 RX ADMIN — HEPARIN SODIUM 5000 UNITS: 10000 INJECTION, SOLUTION INTRAVENOUS; SUBCUTANEOUS at 14:24

## 2023-06-01 RX ADMIN — PIPERACILLIN AND TAZOBACTAM 3.38 G: 3; .375 INJECTION, POWDER, LYOPHILIZED, FOR SOLUTION INTRAVENOUS at 00:13

## 2023-06-01 RX ADMIN — SUGAMMADEX 200 MG: 100 INJECTION, SOLUTION INTRAVENOUS at 16:36

## 2023-06-01 RX ADMIN — LIDOCAINE HYDROCHLORIDE 3 ML: 10 INJECTION, SOLUTION INFILTRATION; PERINEURAL at 14:39

## 2023-06-01 RX ADMIN — INDOCYANINE GREEN AND WATER 2.5 MG: KIT at 14:24

## 2023-06-01 RX ADMIN — ROCURONIUM BROMIDE 10 MG: 50 INJECTION, SOLUTION INTRAVENOUS at 15:39

## 2023-06-01 ASSESSMENT — ACTIVITIES OF DAILY LIVING (ADL)
ADLS_ACUITY_SCORE: 20
ADLS_ACUITY_SCORE: 22
ADLS_ACUITY_SCORE: 20
ADLS_ACUITY_SCORE: 22
ADLS_ACUITY_SCORE: 20
ADLS_ACUITY_SCORE: 22

## 2023-06-01 NOTE — OP NOTE
[facility]    Operative Note    Pre-operative diagnosis: Acute biliary pancreatitis without infection or necrosis [K85.10]  Post-operative diagnosis Same as pre-operative diagnosis    Procedure: Procedure(s):  CHOLECYSTECTOMY, ROBOT-ASSISTED, LAPAROSCOPIC, USING DA LINDSAY XI,  INTRAOPERATIVE CHOLANGIOGRAMS  Surgeon: Surgeon(s) and Role:     * Dimas Cloud DO - Primary  Anesthesia: General   Estimated Blood Loss: 5 mL from 6/1/2023  2:35 PM to 6/1/2023  4:46 PM      Drains: None  Specimens:   ID Type Source Tests Collected by Time Destination   1 : Gallbladder with Stones Tissue Gallbladder with Stone(s) SURGICAL PATHOLOGY EXAM Dimas Cloud DO 6/1/2023  3:24 PM      Findings:     -Acute on chronic cholecystitis.  -Gallstones  -Negative intraoperative cholangiogram    Complications: None.  Implants: * No implants in log *    Indication: 22-year-old female presenting with abdominal pain and elevated LFTs.  Patient was diagnosed with gallstone pancreatitis.  As result we offered the patient a procedure of robotic assisted laparoscopic cholecystectomy with intraoperative cholangiogram. The risks and benefits of the procedure were explained detail to the patient. The risks include infection, bleeding, damage to the surrounding structures. Patient verbalized understanding provided consent to undergo the procedure above.      Procedure: Patient was brought back to the operating room and was placed on the operating table in the supine position.  The patient's extremities were padded and positioned in the usual fashion.  The patient then underwent anesthesia sedation and intubation.  The patient's abdomen was prepped and draped in the usual sterile fashion.  Prior to initiating the procedure, a timeout was completed.  All present were in agreement.    1% lidocaine with epinephrine was instilled at Arriaga's point in the left upper quadrant.  A 15 blade was used to make a skin knick incision at  Arriaga's point.  A Veress needle was inserted into the abdomen and insufflation was initiated. An 8 mm trocar was inserted at the infraumbilical port site.  Insufflation was then initiated.  Once insufflation was completed, a general survey was completed.  I could identify that the patient had no injury of the abdominal contents upon entering the abdomen.  On evaluation of the gallbladder, the patient had significant omentum that was adherent around the gallbladder indicating that the patient did have acute on chronic cholecystitis.    An 8 mm port was placed to the right of the umbilicus.  Two 8 mm ports were placed in the left abdominal quadrant.  These ports were placed under direct visualization.  The robot was then docked and the robotic instruments were then inserted into the abdomen under direct visualization.  The gallbladder was then grasped with tip up graspers and retracted cephalad.  The cystic duct and cystic artery were then carefully dissected and isolated with a combination of blunt dissection with the hook electrocautery.  Once the cystic artery and cystic duct were isolated the critical view was obtained.        Weck clips were used to clip across the cystic artery.  The hook cautery was used to transect between the Weck clips of the cystic artery.  The Weck clip was also placed on the distal portion of the gallbladder.  A small hole was created in the cystic duct in order to feed the catheter for intraoperative cholangiogram.  An intraoperative cholangiogram was then completed.  The intraoperative cholangiogram was negative.  The Weck clip was then placed proximal to where we made a hole on the cystic duct.  The rest of the cystic duct was then transected using the electrocautery hook. The gallbladder was removed off of the liver bed using electrocautery.  The gallbladder was then removed from the abdomen using an Endo Catch bag through the 8 mm left lower quadrant port.      Inspection of the  liver bed revealed good hemostasis.  The fascia of the gallbladder extraction site was then closed using an 0 Vicryl suture with a suture passer.  A 3-0 Vicryl suture was used to perform deep dermal sutures at the 12 mm port site.  All surgical sites were then closed with a 4-0 Vicryl suture in a subcuticular fashion.  Surgical glue was used to reinforce all surgical skin incisions.  At the end of the procedure, a final count was completed.  I was told all sharps, sponges, instruments were accounted for.  The patient tolerated the procedure with no complications.  The patient was then extubated and brought back to the PACU in stable condition.          Dimas Cloud DO  General Surgeon  [facility]  Surgery [address] [address]?  Office: [phone]  Employed by - [facility]  Pager: [phone]

## 2023-06-01 NOTE — ANESTHESIA PROCEDURE NOTES
Airway         Procedure Start/Stop Times: 6/1/2023 2:42 PM  Staff -        Anesthesiologist:  Juanito Albert MD       CRNA: Dorothy York APRN CRNA       Performed By: CRNAIndications and Patient Condition       Indications for airway management: ivonne-procedural       Induction type:intravenous       Mask difficulty assessment: 1 - vent by mask    Final Airway Details       Final airway type: endotracheal airway       Successful airway: ETT - single  Endotracheal Airway Details        ETT size (mm): 7.0       Cuffed: yes       Successful intubation technique: direct laryngoscopy       DL Blade Type: Carlton 2       Grade View of Cords: 1       Adjucts: stylet       Position: Right       Measured from: lips       Secured at (cm): 21       Bite block used: None    Post intubation assessment        Placement verified by: capnometry, equal breath sounds and chest rise        Number of attempts at approach: 1       Secured with: silk tape       Ease of procedure: easy       Dentition: Intact and Unchanged       Dental guard used and removed. Dental Guard Type: Proguard Red.    Medication(s) Administered   Medication Administration Time: 6/1/2023 2:42 PM

## 2023-06-01 NOTE — PROGRESS NOTES
General Surgery Progress Note:    Hospital Day # 1    ASSESSMENT:   1. Acute biliary pancreatitis without infection or necrosis        Amanda Miller is a 22 year old female admitted with gallstone pancreatitis. She looks better clinically today with a soft less distended abdomen and no tenderness. Will await lab results and anticipate lap leonid with IOC later today. If LFTs increase, may need a combo case with GI for ERCP as well. Will touch base with surgeon.    PLAN:   NPO  Possible lap leonid later today      SUBJECTIVE:   Amanda Miller is feeling better. Her abdominal pain is better and she does not feel bloated anymore. She was passing a good amount of gas yesterday but has not passed flatus this morning. No nausea or vomiting. No BM.    Patient Vitals for the past 24 hrs:   BP Temp Temp src Pulse Resp SpO2   06/01/23 0729 110/62 97.9  F (36.6  C) Oral 65 16 97 %   05/31/23 2335 107/59 98.1  F (36.7  C) Oral 65 16 99 %   05/31/23 1912 122/77 98.3  F (36.8  C) Oral 67 18 --   05/31/23 1726 123/70 98.8  F (37.1  C) Oral 74 18 98 %   05/31/23 1400 90/56 -- -- 67 -- 97 %   05/31/23 1245 -- -- -- 64 -- 94 %   05/31/23 1230 -- -- -- 65 -- 95 %   05/31/23 1200 108/64 -- -- 66 -- 99 %   05/31/23 1145 113/66 -- -- 73 -- 97 %   05/31/23 1100 -- -- -- 70 -- 99 %   05/31/23 1000 -- -- -- 65 -- 98 %   05/31/23 0930 -- -- -- 68 -- 98 %   05/31/23 0915 -- -- -- 67 -- 98 %   05/31/23 0800 -- -- -- 70 -- 98 %       Physical Exam:  General: NAD, pleasant  CV:RRR  LUNGS:CTA bilaterally  ABD: soft, obese, not tender  EXT:no CCE    Admission on 05/30/2023   Component Date Value     Protein Total 05/31/2023 6.5      Albumin 05/31/2023 3.7      Bilirubin Total 05/31/2023 0.7      Alkaline Phosphatase 05/31/2023 216 (H)      AST 05/31/2023 202 (H)      ALT 05/31/2023 253 (H)      Bilirubin Direct 05/31/2023 0.32 (H)      Sodium 05/31/2023 142      Potassium 05/31/2023 3.4      Chloride 05/31/2023 107      Carbon Dioxide (CO2)  05/31/2023 25      Anion Gap 05/31/2023 10      Urea Nitrogen 05/31/2023 13.7      Creatinine 05/31/2023 0.76      Calcium 05/31/2023 9.1      Glucose 05/31/2023 109 (H)      GFR Estimate 05/31/2023 >90      Lipase 05/31/2023 2,218 (H)      Troponin T, High Sensiti* 05/31/2023 <6      WBC Count 05/31/2023 6.7      RBC Count 05/31/2023 4.35      Hemoglobin 05/31/2023 11.6 (L)      Hematocrit 05/31/2023 36.8      MCV 05/31/2023 85      MCH 05/31/2023 26.7      MCHC 05/31/2023 31.5      RDW 05/31/2023 13.8      Platelet Count 05/31/2023 261      % Neutrophils 05/31/2023 69      % Lymphocytes 05/31/2023 21      % Monocytes 05/31/2023 6      % Eosinophils 05/31/2023 3      % Basophils 05/31/2023 1      % Immature Granulocytes 05/31/2023 0      NRBCs per 100 WBC 05/31/2023 0      Absolute Neutrophils 05/31/2023 4.6      Absolute Lymphocytes 05/31/2023 1.4      Absolute Monocytes 05/31/2023 0.4      Absolute Eosinophils 05/31/2023 0.2      Absolute Basophils 05/31/2023 0.0      Absolute Immature Granul* 05/31/2023 0.0      Absolute NRBCs 05/31/2023 0.0      Color Urine 05/31/2023 Yellow      Appearance Urine 05/31/2023 Clear      Glucose Urine 05/31/2023 Negative      Bilirubin Urine 05/31/2023 Negative      Ketones Urine 05/31/2023 Negative      Specific Gravity Urine 05/31/2023 1.033 (H)      Blood Urine 05/31/2023 Negative      pH Urine 05/31/2023 6.0      Protein Albumin Urine 05/31/2023 20 (A)      Urobilinogen Urine 05/31/2023 <2.0      Nitrite Urine 05/31/2023 Negative      Leukocyte Esterase Urine 05/31/2023 500 Lizet/uL (A)      Bacteria Urine 05/31/2023 Few (A)      Mucus Urine 05/31/2023 Present (A)      RBC Urine 05/31/2023 <1      WBC Urine 05/31/2023 20 (H)      Squamous Epithelials Uri* 05/31/2023 1      Transitional Epithelials* 05/31/2023 <1      hCG Urine Qualitative 05/31/2023 Negative      Culture 05/31/2023 <10,000 CFU/mL Mixture of urogenital gia      Protein Total 05/31/2023 6.3 (L)      Albumin  05/31/2023 3.6      Bilirubin Total 05/31/2023 0.8      Alkaline Phosphatase 05/31/2023 194 (H)      AST 05/31/2023 111 (H)      ALT 05/31/2023 209 (H)      Bilirubin Direct 05/31/2023 0.24         Nevaeh Emmanuel APRN CNP

## 2023-06-01 NOTE — ANESTHESIA CARE TRANSFER NOTE
Patient: Amanda Miller    Procedure: Procedure(s):  CHOLECYSTECTOMY, ROBOT-ASSISTED, LAPAROSCOPIC, USING DA LINDSAY XI,  INTRAOPERATIVE CHOLANGIOGRAMS       Diagnosis: Acute biliary pancreatitis without infection or necrosis [K85.10]  Diagnosis Additional Information: No value filed.    Anesthesia Type:   General     Note:    Oropharynx: oropharynx clear of all foreign objects  Level of Consciousness: awake  Oxygen Supplementation: face mask  Level of Supplemental Oxygen (L/min / FiO2): 8  Independent Airway: airway patency satisfactory and stable  Dentition: dentition unchanged  Vital Signs Stable: post-procedure vital signs reviewed and stable  Report to RN Given: handoff report given  Patient transferred to: PACU    Handoff Report: Identifed the Patient, Identified the Reponsible Provider, Reviewed the pertinent medical history, Discussed the surgical course, Reviewed Intra-OP anesthesia mangement and issues during anesthesia, Set expectations for post-procedure period and Allowed opportunity for questions and acknowledgement of understanding      Vitals:  Vitals Value Taken Time   /60 06/01/23 1650   Temp     Pulse 67 06/01/23 1651   Resp 18 06/01/23 1651   SpO2 93 % 06/01/23 1651   Vitals shown include unvalidated device data.    Electronically Signed By: BLAYNE Krishnamurthy CRNA  June 1, 2023  4:52 PM

## 2023-06-01 NOTE — ANESTHESIA POSTPROCEDURE EVALUATION
Patient: Amanda Miller    Procedure: Procedure(s):  CHOLECYSTECTOMY, ROBOT-ASSISTED, LAPAROSCOPIC, USING DA LINDSAY XI,  INTRAOPERATIVE CHOLANGIOGRAMS       Anesthesia Type:  General    Note:  Disposition: Outpatient   Postop Pain Control: Uneventful            Sign Out: Well controlled pain   PONV: No   Neuro/Psych: Uneventful            Sign Out: Acceptable/Baseline neuro status   Airway/Respiratory: Uneventful            Sign Out: Acceptable/Baseline resp. status   CV/Hemodynamics: Uneventful            Sign Out: Acceptable CV status; No obvious hypovolemia; No obvious fluid overload   Other NRE: NONE   DID A NON-ROUTINE EVENT OCCUR? No           Last vitals:  Vitals Value Taken Time   /50 06/01/23 1702   Temp 36.3  C (97.3  F) 06/01/23 1650   Pulse 63 06/01/23 1708   Resp 18 06/01/23 1707   SpO2 97 % 06/01/23 1708   Vitals shown include unvalidated device data.    Electronically Signed By: Juanito Albert MD  June 1, 2023  5:09 PM

## 2023-06-01 NOTE — PROGRESS NOTES
[facility]    PROGRESS NOTE - Hospitalist Service    Assessment and Plan  22 years old female who is 5 weeks s/p normal spontaneous vaginal delivery who presented to ER for nausea and vomiting with abdominal pain and found to have acute pancreatitis secondary to cholelithiasis    Acute calculus cholecystitis  - Surgical consult appreciate input  - Continue IV Zosyn   - Continue fluid support plan for surgery  - Postoperative orders as per surgery    Acute pancreatitis  - Secondary to cholelithiasis  - Presented with lipase of 2200  - Improving significantly today  - GI consult, appreciate input  - Continue IV fluid  - Pain control with IV Dilaudid  - Keep patient n.p.o.    Acute hepatitis  - Secondary to cholelithiasis  - GI consult, appreciate input  - Patient may need MRCP/ERCP, defer to GI  - Continue to monitor LFTs    Nausea and vomiting  - Secondary to the above  - Improving  - Keep patient n.p.o. because of surgery  - Continue Zofran as needed    Abdominal pain  -Secondary to above  -Improving with IV  Dilaudid    50 MINUTES SPENT BY ME on the date of service doing chart review, history, exam, documentation & further activities per the note    Principal Problem:    Acute biliary pancreatitis without infection or necrosis  Active Problems:    Cholelithiasis    Cholecystitis      VTE prophylaxis:  Pneumatic Compression Devices  DIET: Orders Placed This Encounter      NPO per Anesthesia Guidelines for Procedure/Surgery Except for: Meds      Disposition/Barriers to discharge: Monitor LFTs, IV antibiotic, surgery  Code Status: Full Code    Subjective:  Amanda is feeling better today, improving nausea and vomiting, improved abdominal pain    PHYSICAL EXAM  Vitals:    05/30/23 2340   Weight: 99.8 kg (220 lb)     B/P:117/70 T:99.3 P:73 R:20     Intake/Output Summary (Last 24 hours) at 6/1/2023 1420  Last data filed at 5/31/2023 2300  Gross per 24 hour   Intake 1893.33 ml   Output --   Net  1893.33 ml      Body mass index is 42.97 kg/m .    Constitutional: awake, alert, cooperative, no apparent distress, and appears stated age  Eyes: Lids and lashes normal, pupils equal, round and reactive to light, extra ocular muscles intact, sclera clear, conjunctiva normal  ENT: Normocephalic, without obvious abnormality, atraumatic, sinuses nontender on palpation, external ears without lesions, oral pharynx with moist mucous membranes, tonsils without erythema or exudates, gums normal and good dentition.  Respiratory: No increased work of breathing, good air exchange, clear to auscultation bilaterally, no crackles or wheezing  Cardiovascular: Normal apical impulse, regular rate and rhythm, normal S1 and S2, no S3 or S4, and no murmur noted  GI: No scars, normal bowel sounds, soft, non-distended, non-tender, no masses palpated, no hepatosplenomegally  Skin: no bruising or bleeding and normal skin color, texture, turgor  Musculoskeletal: There is no redness, warmth, or swelling of the joints.  Full range of motion noted.  no lower extremity pitting edema present  Neurologic: Awake, alert, oriented to name, place and time.  Cranial nerves II-XII are grossly intact.  Motor is 5 out of 5 bilaterally.   Sensory is intact.    Neuropsychiatric: Appropriate with examiner      PERTINENT LABS/IMAGING:    I have personally reviewed the following data over the past 24 hrs:    8.7  \   12.4   / 274     142 106 8.3 /  75   4.2 21 (L) 0.63 \       ALT: 164 (H) AST: 63 (H) AP: 202 (H) TBILI: 0.7   ALB: 3.7 TOT PROTEIN: 6.8 LIPASE: 163 (H)       INR:  1.01 PTT:  N/A   D-dimer:  N/A Fibrinogen:  N/A       Imaging results reviewed over the past 24 hrs:   No results found for this or any previous visit (from the past 24 hour(s)).    Discussed with patient, family, surgery, GI, nursing staff and discharge planner    Og Bustillos MD  [facility]  [phone]

## 2023-06-01 NOTE — ANESTHESIA PREPROCEDURE EVALUATION
"Anesthesia Pre-Procedure Evaluation    Patient: Amanda Miller   MRN: [id number] : 2000        Procedure : Procedure(s):  CHOLECYSTECTOMY, ROBOT-ASSISTED, LAPAROSCOPIC, USING DA LINDSAY XI,  POSSIBLE INTRAOPERATIVE CHOLANGIOGRAMS          Past Medical History:   Diagnosis Date     Anxiety      Depression      Depressive disorder     no medications currently, per pt \"midwife would like me to start again after pregnancy\"     Obesity      Urinary tract infection       Past Surgical History:   Procedure Laterality Date     TONSILLECTOMY       WISDOM TOOTH EXTRACTION        No Known Allergies   Social History     Tobacco Use     Smoking status: Never     Smokeless tobacco: Never   Vaping Use     Vaping status: Never Used   Substance Use Topics     Alcohol use: Not Currently     Comment: occas      Wt Readings from Last 1 Encounters:   23 99.8 kg (220 lb)        Anesthesia Evaluation            ROS/MED HX  ENT/Pulmonary:  - neg pulmonary ROS   (+) MARGIE risk factors, obese,     Neurologic:  - neg neurologic ROS     Cardiovascular:  - neg cardiovascular ROS     METS/Exercise Tolerance:     Hematologic:  - neg hematologic  ROS     Musculoskeletal:  - neg musculoskeletal ROS     GI/Hepatic:     (+) cholecystitis/cholelithiasis,     Renal/Genitourinary:  - neg Renal ROS     Endo:     (+) Obesity,     Psychiatric/Substance Use:     (+) psychiatric history depression and anxiety     Infectious Disease:  - neg infectious disease ROS     Malignancy:  - neg malignancy ROS     Other:            Physical Exam    Airway  airway exam normal      Mallampati: III   TM distance: > 3 FB   Neck ROM: full   Mouth opening: > 3 cm    Respiratory Devices and Support         Dental       (+) Minor Abnormalities - some fillings, tiny chips      Cardiovascular   cardiovascular exam normal       Rhythm and rate: regular and normal     Pulmonary   pulmonary exam normal        breath sounds clear to auscultation           OUTSIDE " LABS:  CBC:   Lab Results   Component Value Date    WBC 8.7 06/01/2023    WBC 6.7 05/31/2023    HGB 12.4 06/01/2023    HGB 11.6 (L) 05/31/2023    HCT 39.8 06/01/2023    HCT 36.8 05/31/2023     06/01/2023     05/31/2023     BMP:   Lab Results   Component Value Date     06/01/2023     05/31/2023    POTASSIUM 4.2 06/01/2023    POTASSIUM 3.4 05/31/2023    CHLORIDE 106 06/01/2023    CHLORIDE 107 05/31/2023    CO2 21 (L) 06/01/2023    CO2 25 05/31/2023    BUN 8.3 06/01/2023    BUN 13.7 05/31/2023    CR 0.63 06/01/2023    CR 0.76 05/31/2023    GLC 75 06/01/2023     (H) 05/31/2023     COAGS:   Lab Results   Component Value Date    INR 1.01 06/01/2023     POC:   Lab Results   Component Value Date    HCG Negative 05/31/2023     HEPATIC:   Lab Results   Component Value Date    ALBUMIN 3.7 06/01/2023    PROTTOTAL 6.8 06/01/2023     (H) 06/01/2023    AST 63 (H) 06/01/2023    ALKPHOS 202 (H) 06/01/2023    BILITOTAL 0.7 06/01/2023     OTHER:   Lab Results   Component Value Date    TINA 9.3 06/01/2023    LIPASE 163 (H) 06/01/2023       Anesthesia Plan    ASA Status:  3   NPO Status:  NPO Appropriate    Anesthesia Type: General.     - Airway: ETT   Induction: Intravenous, Propofol.   Maintenance: TIVA.        Consents    Anesthesia Plan(s) and associated risks, benefits, and realistic alternatives discussed. Questions answered and patient/representative(s) expressed understanding.    - Discussed:     - Discussed with:  Patient, Spouse      - Extended Intubation/Ventilatory Support Discussed: No.         Postoperative Care    Pain management: IV analgesics, Oral pain medications, Multi-modal analgesia.   PONV prophylaxis: Ondansetron (or other 5HT-3), Dexamethasone or Solumedrol     Comments:    Other Comments: GETA, TIVA, mod.RSI, toradol            Juanito Castilloell, MD

## 2023-06-01 NOTE — PLAN OF CARE
Problem: Pain Acute  Goal: Optimal Pain Control and Function  Intervention: Develop Pain Management Plan  Recent Flowsheet Documentation  Taken 5/31/2023 1700 by Jaycee Alvares RN  Pain Management Interventions: declines   Goal Outcome Evaluation:       ED admit during change of shift, complains of slight abdominal pain, declines prn pain med, is alert and oriented, vitals stable, independent in room, is pumping milk, has milk in med room fridge, remains NPO, is scheduled for surgery tomorrom

## 2023-06-01 NOTE — PLAN OF CARE
Problem: Pain Acute  Goal: Optimal Pain Control and Function  Intervention: Prevent or Manage Pain  Recent Flowsheet Documentation  Taken 6/1/2023 1332 by Naomi Mccollum, RN  Medication Review/Management: medications reviewed  Taken 6/1/2023 0840 by Naomi Mccollum, RN  Medication Review/Management: medications reviewed  Pt denies pain throughout shift.     Problem: Plan of Care - These are the overarching goals to be used throughout the patient stay.    Goal: Optimal Comfort and Wellbeing  Outcome: Progressing  Pt is pumping breasts and recently pumped and sent breast milk on with her parents for baby.     Problem: Plan of Care - These are the overarching goals to be used throughout the patient stay.    Goal: Readiness for Transition of Care  Outcome: Progressing   Goal Outcome Evaluation:  VS taken.  Oral temp 99.3 at time of JESUS ALBERTO pickup from room.  Pt independent with all cares.

## 2023-06-01 NOTE — PROGRESS NOTES
"Care Management Follow Up    Length of Stay (days): 1    Expected Discharge Date: 2023     Concerns to be Addressed: no discharge needs identified     Patient plan of care discussed at interdisciplinary rounds: Yes    Anticipated Discharge Disposition: Home     Anticipated Discharge Services: None  Anticipated Discharge DME: None    Patient/family educated on Medicare website which has current facility and service quality ratings: no  Education Provided on the Discharge Plan: Yes  Patient/Family in Agreement with the Plan: yes    Referrals Placed by CM/SW:  NA  Private pay costs discussed: Not applicable    Additional Information:  RNCM reviewed chart.  Patient admitted for gallstone pancreatitis. Patient having  cholecystectomy with intraoperative cholangiogram  surgical procedure today.     Social History:  \"Pt is 5 weeks post partum, she lives at home with her spouse and  and is independent without supplies or equipment.\"     Discharge goal to return home with family transport.    CM will follow medical progression and aide in discharge planning as needed.    Kayla Mehta RN        "

## 2023-06-02 VITALS
RESPIRATION RATE: 18 BRPM | HEART RATE: 65 BPM | SYSTOLIC BLOOD PRESSURE: 115 MMHG | OXYGEN SATURATION: 94 % | BODY MASS INDEX: 43.19 KG/M2 | DIASTOLIC BLOOD PRESSURE: 64 MMHG | WEIGHT: 220 LBS | HEIGHT: 60 IN | TEMPERATURE: 98.4 F

## 2023-06-02 LAB
ALBUMIN SERPL BCG-MCNC: 3.6 G/DL (ref 3.5–5.2)
ALP SERPL-CCNC: 171 U/L (ref 35–104)
ALT SERPL W P-5'-P-CCNC: 119 U/L (ref 10–35)
ANION GAP SERPL CALCULATED.3IONS-SCNC: 13 MMOL/L (ref 7–15)
AST SERPL W P-5'-P-CCNC: 41 U/L (ref 10–35)
BILIRUB SERPL-MCNC: 0.5 MG/DL
BUN SERPL-MCNC: 7.2 MG/DL (ref 6–20)
CALCIUM SERPL-MCNC: 9.3 MG/DL (ref 8.6–10)
CHLORIDE SERPL-SCNC: 105 MMOL/L (ref 98–107)
CREAT SERPL-MCNC: 0.69 MG/DL (ref 0.51–0.95)
DEPRECATED HCO3 PLAS-SCNC: 22 MMOL/L (ref 22–29)
ERYTHROCYTE [DISTWIDTH] IN BLOOD BY AUTOMATED COUNT: 13.8 % (ref 10–15)
GFR SERPL CREATININE-BSD FRML MDRD: >90 ML/MIN/1.73M2
GLUCOSE SERPL-MCNC: 100 MG/DL (ref 70–99)
HCT VFR BLD AUTO: 35.7 % (ref 35–47)
HGB BLD-MCNC: 11.4 G/DL (ref 11.7–15.7)
MCH RBC QN AUTO: 26.9 PG (ref 26.5–33)
MCHC RBC AUTO-ENTMCNC: 31.9 G/DL (ref 31.5–36.5)
MCV RBC AUTO: 84 FL (ref 78–100)
PLATELET # BLD AUTO: 283 10E3/UL (ref 150–450)
POTASSIUM SERPL-SCNC: 4.1 MMOL/L (ref 3.4–5.3)
PROT SERPL-MCNC: 6.6 G/DL (ref 6.4–8.3)
RBC # BLD AUTO: 4.24 10E6/UL (ref 3.8–5.2)
SODIUM SERPL-SCNC: 140 MMOL/L (ref 136–145)
WBC # BLD AUTO: 8 10E3/UL (ref 4–11)

## 2023-06-02 PROCEDURE — 36415 COLL VENOUS BLD VENIPUNCTURE: CPT | Performed by: SURGERY

## 2023-06-02 PROCEDURE — G0378 HOSPITAL OBSERVATION PER HR: HCPCS

## 2023-06-02 PROCEDURE — 85027 COMPLETE CBC AUTOMATED: CPT | Performed by: SURGERY

## 2023-06-02 PROCEDURE — 96376 TX/PRO/DX INJ SAME DRUG ADON: CPT

## 2023-06-02 PROCEDURE — C9113 INJ PANTOPRAZOLE SODIUM, VIA: HCPCS | Performed by: SURGERY

## 2023-06-02 PROCEDURE — 250N000013 HC RX MED GY IP 250 OP 250 PS 637: Performed by: SURGERY

## 2023-06-02 PROCEDURE — 250N000011 HC RX IP 250 OP 636: Performed by: SURGERY

## 2023-06-02 PROCEDURE — 99239 HOSP IP/OBS DSCHRG MGMT >30: CPT | Performed by: INTERNAL MEDICINE

## 2023-06-02 PROCEDURE — 80053 COMPREHEN METABOLIC PANEL: CPT | Performed by: SURGERY

## 2023-06-02 PROCEDURE — 96361 HYDRATE IV INFUSION ADD-ON: CPT

## 2023-06-02 RX ORDER — DOCUSATE SODIUM 100 MG/1
100 CAPSULE, LIQUID FILLED ORAL 2 TIMES DAILY
Qty: 30 CAPSULE | Refills: 0 | Status: SHIPPED | OUTPATIENT
Start: 2023-06-02 | End: 2023-06-07

## 2023-06-02 RX ORDER — HYDROCODONE BITARTRATE AND ACETAMINOPHEN 5; 325 MG/1; MG/1
1 TABLET ORAL EVERY 6 HOURS PRN
Qty: 10 TABLET | Refills: 0 | Status: SHIPPED | OUTPATIENT
Start: 2023-06-02 | End: 2023-06-05

## 2023-06-02 RX ADMIN — ACETAMINOPHEN 975 MG: 325 TABLET ORAL at 11:52

## 2023-06-02 RX ADMIN — POLYETHYLENE GLYCOL 3350 17 G: 17 POWDER, FOR SOLUTION ORAL at 08:00

## 2023-06-02 RX ADMIN — PIPERACILLIN AND TAZOBACTAM 3.38 G: 3; .375 INJECTION, POWDER, LYOPHILIZED, FOR SOLUTION INTRAVENOUS at 01:42

## 2023-06-02 RX ADMIN — SENNOSIDES AND DOCUSATE SODIUM 1 TABLET: 50; 8.6 TABLET ORAL at 08:00

## 2023-06-02 RX ADMIN — ACETAMINOPHEN 975 MG: 325 TABLET ORAL at 04:46

## 2023-06-02 RX ADMIN — PIPERACILLIN AND TAZOBACTAM 3.38 G: 3; .375 INJECTION, POWDER, LYOPHILIZED, FOR SOLUTION INTRAVENOUS at 08:00

## 2023-06-02 RX ADMIN — PANTOPRAZOLE SODIUM 40 MG: 40 INJECTION, POWDER, FOR SOLUTION INTRAVENOUS at 08:00

## 2023-06-02 ASSESSMENT — ACTIVITIES OF DAILY LIVING (ADL)
ADLS_ACUITY_SCORE: 22

## 2023-06-02 NOTE — DISCHARGE INSTRUCTIONS
Follow up: please call us at [phone] to schedule an appointment at your convenience.  If you would prefer to follow up with us by phone please let us know so that we may contact you 2-3 weeks following your procedure.         Diet: Regular diet. Patients can have difficulty with constipation following surgery, due in part to the administration of narcotic medications.  If you are suffering with constipation, you should avoid foods such as hard cheeses or red meat.  Foods high infiber are recommended.       Activity: You should continue to be active at home, including ambulating frequently.  If possible try to limit the amount of time spent in bed.     Restrictions: You have no liftingrestrictions post operatively, but may wish to avoid strenuous physical activity for 1-2 weeks.  You should limit your physical activity if it causes you discomfort; however, this should resolve within 1-2 weeks.   Walking does not count as strenuous physical activity.  You are safe to walk up and down stairs.  Following 2 weeks you may resume all normal physical activity.     Wound / drain care: Your incisions are closed using absorbale sutures.  The skin is sealed with a surgical glue.  Do not peal the glue off.  Please allow the glue to peal off on its own.      It is normal to have a smallrim of red present around the incisions. This should not, however, extend beyond 1/4 inch from the incision.  If your incisions become increasingly tender, red, or draining, please contact us.        Bathing: Youmay shower after 24 hours from surgery.  It is ok to get your incisions wet, but avoid rubbing them.  Avoid soaking in bath tubs, or swimming in lakes, pools, or streams for 4 weeks following surgery.

## 2023-06-02 NOTE — DISCHARGE SUMMARY
[facility]  Hospitalist Discharge Summary      Date of Admission:  5/30/2023  Date of Discharge:  6/2/2023  Discharging Provider: Og Bustillos MD  Discharge Service: Hospitalist Service    Discharge Diagnoses   Acute Cholecystitis Status Post Lap Cholecystectomy  Acute calculus pancreatitis, improving  Acute hepatitis, improving  Nausea and vomiting, resolved  Abdominal pain, improving    Clinically Significant Risk Factors     # Severe Obesity: Estimated body mass index is 42.97 kg/m  as calculated from the following:    Height as of this encounter: 1.524 m (5').    Weight as of this encounter: 99.8 kg (220 lb).       Follow-ups Needed After Discharge   Follow-up Appointments     Follow-up and recommended labs and tests       None             Unresulted Labs Ordered in the Past 30 Days of this Admission     Date and Time Order Name Status Description    6/1/2023  4:26 PM Surgical Pathology Exam In process       These results will be followed up by Surgery     Discharge Disposition   Discharged to home  Condition at discharge: Stable    Hospital Course   22 years old female who is 5 weeks s/p normal spontaneous vaginal delivery who presented to ER for nausea and vomiting with abdominal pain and found to have acute pancreatitis secondary to cholelithiasis, please refer to H&P for details     Acute calculus cholecystitis  - Surgical consult appreciate input  - Received IV Zosyn   - S/P lap cholecystectomy on 6/1/2023  - POD#1  - Postoperative orders as per surgery  -Stable for home discharge today as per surgery     Acute pancreatitis  - Secondary to cholelithiasis  - Presented with lipase of 2200  - Improving significantly   - GI consult, appreciate input  - Continue IV fluid  - Pain control with IV Dilaudid, resolved  - Advance diet as tolerated.     Acute hepatitis  - Secondary to cholelithiasis  - GI consult, appreciate input  - Patient may need MRCP/ERCP, defer to GI  - Patient had  negative intraoperative cholangiogram  - Continue to monitor LFTs as outpatient     Nausea and vomiting  - Secondary to the above  - Improving  - Keep patient n.p.o. because of surgery  - Continue Zofran as needed     Abdominal pain  -Secondary to above  -Improving with IV  Dilaudid  -Switch to oral medications on discharge as per surgery    Discussed with patient, GI, surgery, nursing staff and discharge planner.    Consultations This Hospital Stay   GASTROENTEROLOGY IP CONSULT  SURGERY GENERAL IP CONSULT  CARE MANAGEMENT / SOCIAL WORK IP CONSULT    Code Status   Full Code    Time Spent on this Encounter   I, Og Bustillos MD, personally saw the patient today and spent greater than 30 minutes discharging this patient.       Og Bustillos MD  [address] [address]  Phone: [phone]  Fax: [phone]  ______________________________________________________________________    Physical Exam   Vital Signs: Temp: (P) 98.2  F (36.8  C) Temp src: (P) Oral BP: (P) 116/60 Pulse: (P) 51   Resp: (P) 16 SpO2: (P) 96 % O2 Device: (P) None (Room air) Oxygen Delivery: 6 LPM  Weight: 220 lbs 0 oz  Constitutional: awake, alert, cooperative, no apparent distress, and appears stated age  Respiratory: No increased work of breathing, good air exchange, clear to auscultation bilaterally, no crackles or wheezing  Cardiovascular: Normal apical impulse, regular rate and rhythm, normal S1 and S2, no S3 or S4, and no murmur noted  GI: No scars, normal bowel sounds, soft, non-distended, non-tender, no masses palpated, no hepatosplenomegally  Skin: no bruising or bleeding and normal skin color, texture, turgor  Musculoskeletal: There is no redness, warmth, or swelling of the joints.  Full range of motion noted.  no lower extremity pitting edema present  Neurologic: Awake, alert, oriented to name, place and time.  Cranial nerves II-XII are grossly intact.  Motor is 5 out of 5  bilaterally.   Sensory is intact.    Neuropsychiatric: Appropriate with examiner       Primary Care Physician   [facility]    Discharge Orders      Reason for your hospital stay    Gallstone pancreatitis     Follow-up and recommended labs and tests     None     Activity    Your activity upon discharge: {As tolerated     Diet    Follow this diet upon discharge: regular       Significant Results and Procedures   Most Recent 3 CBC's:Recent Labs   Lab Test 06/02/23  0549 06/01/23  1925 06/01/23  0711 05/31/23  0020   WBC 8.0  --  8.7 6.7   HGB 11.4*  --  12.4 11.6*   MCV 84  --  86 85    276 274 261     Most Recent 3 BMP's:Recent Labs   Lab Test 06/02/23  0549 06/01/23  0711 05/31/23  0020    142 142   POTASSIUM 4.1 4.2 3.4   CHLORIDE 105 106 107   CO2 22 21* 25   BUN 7.2 8.3 13.7   CR 0.69 0.63 0.76   ANIONGAP 13 15 10   TINA 9.3 9.3 9.1   * 75 109*   ,   Results for orders placed or performed during the hospital encounter of 05/30/23   Abdomen US, limited (RUQ only)    Narrative    EXAM: US ABDOMEN LIMITED  LOCATION: [facility]  DATE/TIME: 5/31/2023 2:25 AM CDT    INDICATION: Epigastric pain, episodic.  COMPARISON: None.  TECHNIQUE: Limited abdominal ultrasound.    FINDINGS:    GALLBLADDER: Abnormal appearance of the gallbladder. There are multiple gallstones in the gallbladder. There is prominent diffuse gallbladder wall thickening measuring up to 9 mm. Despite a negative sonographic Merino's sign, findings could be seen with   acute cholecystitis in the appropriate setting and clinical correlation is needed.    BILE DUCTS: No biliary dilatation. The common duct measures 4 mm.    LIVER: Normal parenchyma with smooth contour. No focal mass.    RIGHT KIDNEY: No hydronephrosis.    PANCREAS: The visualized portions are normal.    No ascites.      Impression    IMPRESSION:  1.  Cholelithiasis with marked diffuse gallbladder wall thickening. Despite a negative  sonographic Merino's sign, findings could be seen with cholecystitis in the appropriate clinical setting. Clinical correlation.    2.  No biliary dilatation.    3.  Remainder of the right upper quadrant ultrasound is unremarkable.       XR Surgery SUDHA L/T 5 Min Fluoro    Narrative    This exam was marked as non-reportable because it will not be read by a   radiologist or a [facility] non-radiologist provider.               Discharge Medications   Current Discharge Medication List      START taking these medications    Details   !! docusate sodium (COLACE) 100 MG capsule Take 1 capsule (100 mg) by mouth 2 times daily  Qty: 30 capsule, Refills: 0    Associated Diagnoses: Acute biliary pancreatitis without infection or necrosis; Cholecystitis      HYDROcodone-acetaminophen (NORCO) 5-325 MG tablet Take 1 tablet by mouth every 6 hours as needed for pain  Qty: 10 tablet, Refills: 0    Associated Diagnoses: Acute biliary pancreatitis without infection or necrosis; Cholecystitis       !! - Potential duplicate medications found. Please discuss with provider.      CONTINUE these medications which have NOT CHANGED    Details   acetaminophen (TYLENOL) 500 MG tablet Take 500-1,000 mg by mouth every 6 hours as needed for mild pain      !! docusate sodium (COLACE) 50 MG capsule Take 50 mg by mouth 2 times daily as needed for constipation      escitalopram (LEXAPRO) 10 MG tablet Take 1 tablet (10 mg) by mouth daily  Qty: 30 tablet, Refills: 0    Associated Diagnoses:  (normal spontaneous vaginal delivery); Positive depression screening      ibuprofen (ADVIL/MOTRIN) 600 MG tablet Take 1 tablet (600 mg) by mouth every 6 hours as needed for other (cramping)    Associated Diagnoses:  (normal spontaneous vaginal delivery)      Prenatal Vit-Fe Fumarate-FA (PRENATAL VITAMIN PO) Take 1 tablet by mouth daily       !! - Potential duplicate medications found. Please discuss with provider.        Allergies   No Known Allergies

## 2023-06-02 NOTE — PROGRESS NOTES
General Surgery Progress Note:    Hospital Day # 2    ASSESSMENT:   1. Acute biliary pancreatitis without infection or necrosis    2. Cholecystitis        Amanda Miller is a 22 year old female presenting with possible pancreatitis status post robotic assisted cholecystectomy with IOC on 6/1/2023.  Patient is doing well.  LFTs are decreasing.    PLAN:   -Patient is cleared for discharge from surgical standpoint.  Pain prescriptions have been provided.  Follow-up information has been provided.  -Regular diet      SUBJECTIVE:   Amanda Miller was seen on rounds.  Patient doing well with no new complaints.    Patient Vitals for the past 24 hrs:   BP Temp Temp src Pulse Resp SpO2   06/02/23 0716 (P) 116/60 (P) 98.2  F (36.8  C) (P) Oral (P) 51 (P) 16 (P) 96 %   06/02/23 0400 115/64 98.4  F (36.9  C) Oral 65 18 94 %   06/01/23 2254 121/70 98.4  F (36.9  C) Oral 78 20 94 %   06/01/23 2000 101/55 98  F (36.7  C) Oral 86 22 94 %   06/01/23 1905 97/52 98.1  F (36.7  C) Oral 108 22 94 %   06/01/23 1845 99/53 98.2  F (36.8  C) Oral 97 22 94 %   06/01/23 1815 104/51 98  F (36.7  C) Oral 90 22 94 %   06/01/23 1800 97/53 -- -- 92 19 94 %   06/01/23 1745 92/54 -- -- 83 24 99 %   06/01/23 1730 103/56 -- -- 90 19 96 %   06/01/23 1715 95/50 -- -- 82 17 94 %   06/01/23 1700 92/50 -- -- 85 18 94 %   06/01/23 1650 131/74 97.3  F (36.3  C) Temporal 67 24 93 %   06/01/23 1332 117/70 99.3  F (37.4  C) Oral 73 20 99 %   06/01/23 1101 96/64 98  F (36.7  C) Oral 65 20 99 %       Physical Exam:  General: NAD, pleasant  CV:RRR  LUNGS:Normal respiratory effort, no accessory muscle use  ABD: Soft, minimally distended.  Surgical incisions well approximated.  Appropriate tenderness to palpation  EXT:no CCE    Admission on 05/30/2023   Component Date Value     Protein Total 05/31/2023 6.5      Albumin 05/31/2023 3.7      Bilirubin Total 05/31/2023 0.7      Alkaline Phosphatase 05/31/2023 216 (H)      AST 05/31/2023 202 (H)      ALT 05/31/2023 253  (H)      Bilirubin Direct 05/31/2023 0.32 (H)      Sodium 05/31/2023 142      Potassium 05/31/2023 3.4      Chloride 05/31/2023 107      Carbon Dioxide (CO2) 05/31/2023 25      Anion Gap 05/31/2023 10      Urea Nitrogen 05/31/2023 13.7      Creatinine 05/31/2023 0.76      Calcium 05/31/2023 9.1      Glucose 05/31/2023 109 (H)      GFR Estimate 05/31/2023 >90      Lipase 05/31/2023 2,218 (H)      Troponin T, High Sensiti* 05/31/2023 <6      WBC Count 05/31/2023 6.7      RBC Count 05/31/2023 4.35      Hemoglobin 05/31/2023 11.6 (L)      Hematocrit 05/31/2023 36.8      MCV 05/31/2023 85      MCH 05/31/2023 26.7      MCHC 05/31/2023 31.5      RDW 05/31/2023 13.8      Platelet Count 05/31/2023 261      % Neutrophils 05/31/2023 69      % Lymphocytes 05/31/2023 21      % Monocytes 05/31/2023 6      % Eosinophils 05/31/2023 3      % Basophils 05/31/2023 1      % Immature Granulocytes 05/31/2023 0      NRBCs per 100 WBC 05/31/2023 0      Absolute Neutrophils 05/31/2023 4.6      Absolute Lymphocytes 05/31/2023 1.4      Absolute Monocytes 05/31/2023 0.4      Absolute Eosinophils 05/31/2023 0.2      Absolute Basophils 05/31/2023 0.0      Absolute Immature Granul* 05/31/2023 0.0      Absolute NRBCs 05/31/2023 0.0      Color Urine 05/31/2023 Yellow      Appearance Urine 05/31/2023 Clear      Glucose Urine 05/31/2023 Negative      Bilirubin Urine 05/31/2023 Negative      Ketones Urine 05/31/2023 Negative      Specific Gravity Urine 05/31/2023 1.033 (H)      Blood Urine 05/31/2023 Negative      pH Urine 05/31/2023 6.0      Protein Albumin Urine 05/31/2023 20 (A)      Urobilinogen Urine 05/31/2023 <2.0      Nitrite Urine 05/31/2023 Negative      Leukocyte Esterase Urine 05/31/2023 500 Lizet/uL (A)      Bacteria Urine 05/31/2023 Few (A)      Mucus Urine 05/31/2023 Present (A)      RBC Urine 05/31/2023 <1      WBC Urine 05/31/2023 20 (H)      Squamous Epithelials Uri* 05/31/2023 1      Transitional Epithelials* 05/31/2023 <1      hCG  Urine Qualitative 05/31/2023 Negative      Culture 05/31/2023 <10,000 CFU/mL Mixture of urogenital gia      Protein Total 05/31/2023 6.3 (L)      Albumin 05/31/2023 3.6      Bilirubin Total 05/31/2023 0.8      Alkaline Phosphatase 05/31/2023 194 (H)      AST 05/31/2023 111 (H)      ALT 05/31/2023 209 (H)      Bilirubin Direct 05/31/2023 0.24      WBC Count 06/01/2023 8.7      RBC Count 06/01/2023 4.63      Hemoglobin 06/01/2023 12.4      Hematocrit 06/01/2023 39.8      MCV 06/01/2023 86      MCH 06/01/2023 26.8      MCHC 06/01/2023 31.2 (L)      RDW 06/01/2023 13.8      Platelet Count 06/01/2023 274      Sodium 06/01/2023 142      Potassium 06/01/2023 4.2      Chloride 06/01/2023 106      Carbon Dioxide (CO2) 06/01/2023 21 (L)      Anion Gap 06/01/2023 15      Urea Nitrogen 06/01/2023 8.3      Creatinine 06/01/2023 0.63      Calcium 06/01/2023 9.3      Glucose 06/01/2023 75      Alkaline Phosphatase 06/01/2023 202 (H)      AST 06/01/2023 63 (H)      ALT 06/01/2023 164 (H)      Protein Total 06/01/2023 6.8      Albumin 06/01/2023 3.7      Bilirubin Total 06/01/2023 0.7      GFR Estimate 06/01/2023 >90      INR 06/01/2023 1.01      Lipase 06/01/2023 163 (H)      Bilirubin Direct 06/01/2023 <0.20      Platelet Count 06/01/2023 276      Hold Specimen 06/01/2023 JIC      WBC Count 06/02/2023 8.0      RBC Count 06/02/2023 4.24      Hemoglobin 06/02/2023 11.4 (L)      Hematocrit 06/02/2023 35.7      MCV 06/02/2023 84      MCH 06/02/2023 26.9      MCHC 06/02/2023 31.9      RDW 06/02/2023 13.8      Platelet Count 06/02/2023 283      Sodium 06/02/2023 140      Potassium 06/02/2023 4.1      Chloride 06/02/2023 105      Carbon Dioxide (CO2) 06/02/2023 22      Anion Gap 06/02/2023 13      Urea Nitrogen 06/02/2023 7.2      Creatinine 06/02/2023 0.69      Calcium 06/02/2023 9.3      Glucose 06/02/2023 100 (H)      Alkaline Phosphatase 06/02/2023 171 (H)      AST 06/02/2023 41 (H)      ALT 06/02/2023 119 (H)      Protein Total  06/02/2023 6.6      Albumin 06/02/2023 3.6      Bilirubin Total 06/02/2023 0.5      GFR Estimate 06/02/2023 >90         DO Dimas Jennings DO  General Surgeon  [facility]  Surgery [address] [address]?  Office: [phone]  Employed by - [employer]  Pager: [phone]

## 2023-06-02 NOTE — PROGRESS NOTES
Care Management Discharge Note    Discharge Date: 06/02/2023       Discharge Disposition: Home    Discharge Services: None    Discharge DME: None    Discharge Transportation: family or friend will provide    Private pay costs discussed: Not applicable    Does the patient's insurance plan have a 3 day qualifying hospital stay waiver?  No    PAS Confirmation Code:  NA  Patient/family educated on Medicare website which has current facility and service quality ratings: no    Education Provided on the Discharge Plan: Yes (per team)  Persons Notified of Discharge Plans: Per team  Patient/Family in Agreement with the Plan: yes    Handoff Referral Completed: Yes    Additional Information:  Patient discharging home today.  No needs from CM identified.  Patient to arrange transportation.         Kayla Mehta RN

## 2023-06-02 NOTE — PLAN OF CARE
[id number]    Patient walked from cart to bed upon return from PACU. Patient settled in room. Clear liquids. Pain 3/10. Ice pack in place. NS continuous fluids restarted per orders. Family at bedside.

## 2023-06-02 NOTE — PROGRESS NOTES
Called pharmacist to verify medications are compatible with breastfeeding. Recommends to discard pumped milk for at least 24 hours.    Meds mentioned with caution:   Fentanyl  mcg 1439  Hydromorphone IV 1mg 1502    Relayed information to patient and spouse.    Marlon Avila RN

## 2023-06-02 NOTE — PLAN OF CARE
Problem: Plan of Care - These are the overarching goals to be used throughout the patient stay.    Goal: Optimal Comfort and Wellbeing  Outcome: Adequate for Care Transition  Intervention: Monitor Pain and Promote Comfort  Recent Flowsheet Documentation  Taken 6/2/2023 0800 by Polo Parahm RN  Pain Management Interventions: repositioned   Goal Outcome Evaluation:    Discharge orders written and reviewed with patient.  Stated she understood and signed papers.  Discharge medication given to patient.  States she has all her belongings.  Spouse to transport home.

## 2023-06-04 ENCOUNTER — HEALTH MAINTENANCE LETTER (OUTPATIENT)
Age: 23
End: 2023-06-04

## 2023-06-05 LAB
PATH REPORT.COMMENTS IMP SPEC: NORMAL
PATH REPORT.COMMENTS IMP SPEC: NORMAL
PATH REPORT.FINAL DX SPEC: NORMAL
PATH REPORT.GROSS SPEC: NORMAL
PATH REPORT.MICROSCOPIC SPEC OTHER STN: NORMAL
PATH REPORT.RELEVANT HX SPEC: NORMAL
PHOTO IMAGE: NORMAL

## 2023-06-05 PROCEDURE — 88304 TISSUE EXAM BY PATHOLOGIST: CPT | Mod: 26 | Performed by: PATHOLOGY

## 2023-06-06 LAB
ATRIAL RATE - MUSE: 59 BPM
DIASTOLIC BLOOD PRESSURE - MUSE: 65 MMHG
INTERPRETATION ECG - MUSE: NORMAL
P AXIS - MUSE: 4 DEGREES
PR INTERVAL - MUSE: 108 MS
QRS DURATION - MUSE: 88 MS
QT - MUSE: 416 MS
QTC - MUSE: 411 MS
R AXIS - MUSE: 74 DEGREES
SYSTOLIC BLOOD PRESSURE - MUSE: 118 MMHG
T AXIS - MUSE: 53 DEGREES
VENTRICULAR RATE- MUSE: 59 BPM

## 2023-06-07 ENCOUNTER — APPOINTMENT (OUTPATIENT)
Dept: CT IMAGING | Facility: HOSPITAL | Age: 23
End: 2023-06-07
Attending: EMERGENCY MEDICINE
Payer: COMMERCIAL

## 2023-06-07 ENCOUNTER — NURSE TRIAGE (OUTPATIENT)
Dept: NURSING | Facility: CLINIC | Age: 23
End: 2023-06-07
Payer: COMMERCIAL

## 2023-06-07 ENCOUNTER — APPOINTMENT (OUTPATIENT)
Dept: MRI IMAGING | Facility: HOSPITAL | Age: 23
End: 2023-06-07
Attending: EMERGENCY MEDICINE
Payer: COMMERCIAL

## 2023-06-07 ENCOUNTER — HOSPITAL ENCOUNTER (INPATIENT)
Facility: HOSPITAL | Age: 23
LOS: 2 days | Discharge: HOME | End: 2023-06-09
Attending: EMERGENCY MEDICINE | Admitting: INTERNAL MEDICINE
Payer: COMMERCIAL

## 2023-06-07 DIAGNOSIS — R10.13 EPIGASTRIC PAIN: ICD-10-CM

## 2023-06-07 LAB
ALBUMIN SERPL BCG-MCNC: 4.3 G/DL (ref 3.5–5.2)
ALBUMIN UR-MCNC: NEGATIVE MG/DL
ALP SERPL-CCNC: 328 U/L (ref 35–104)
ALT SERPL W P-5'-P-CCNC: 76 U/L (ref 10–35)
ANION GAP SERPL CALCULATED.3IONS-SCNC: 12 MMOL/L (ref 7–15)
APPEARANCE UR: CLEAR
AST SERPL W P-5'-P-CCNC: 99 U/L (ref 10–35)
BASOPHILS # BLD AUTO: 0 10E3/UL (ref 0–0.2)
BASOPHILS NFR BLD AUTO: 0 %
BILIRUB DIRECT SERPL-MCNC: 0.35 MG/DL (ref 0–0.3)
BILIRUB SERPL-MCNC: 0.7 MG/DL
BILIRUB UR QL STRIP: NEGATIVE
BUN SERPL-MCNC: 11.4 MG/DL (ref 6–20)
CALCIUM SERPL-MCNC: 10.2 MG/DL (ref 8.6–10)
CHLORIDE SERPL-SCNC: 103 MMOL/L (ref 98–107)
COLOR UR AUTO: ABNORMAL
CREAT SERPL-MCNC: 0.73 MG/DL (ref 0.51–0.95)
DEPRECATED HCO3 PLAS-SCNC: 26 MMOL/L (ref 22–29)
EOSINOPHIL # BLD AUTO: 0.1 10E3/UL (ref 0–0.7)
EOSINOPHIL NFR BLD AUTO: 1 %
ERYTHROCYTE [DISTWIDTH] IN BLOOD BY AUTOMATED COUNT: 13.3 % (ref 10–15)
GFR SERPL CREATININE-BSD FRML MDRD: >90 ML/MIN/1.73M2
GLUCOSE SERPL-MCNC: 110 MG/DL (ref 70–99)
GLUCOSE UR STRIP-MCNC: NEGATIVE MG/DL
HCG SERPL QL: NEGATIVE
HCT VFR BLD AUTO: 42.3 % (ref 35–47)
HGB BLD-MCNC: 13.3 G/DL (ref 11.7–15.7)
HGB UR QL STRIP: NEGATIVE
IMM GRANULOCYTES # BLD: 0.1 10E3/UL
IMM GRANULOCYTES NFR BLD: 1 %
KETONES UR STRIP-MCNC: NEGATIVE MG/DL
LEUKOCYTE ESTERASE UR QL STRIP: NEGATIVE
LIPASE SERPL-CCNC: 108 U/L (ref 13–60)
LYMPHOCYTES # BLD AUTO: 1.4 10E3/UL (ref 0.8–5.3)
LYMPHOCYTES NFR BLD AUTO: 11 %
MCH RBC QN AUTO: 26.5 PG (ref 26.5–33)
MCHC RBC AUTO-ENTMCNC: 31.4 G/DL (ref 31.5–36.5)
MCV RBC AUTO: 84 FL (ref 78–100)
MONOCYTES # BLD AUTO: 0.7 10E3/UL (ref 0–1.3)
MONOCYTES NFR BLD AUTO: 5 %
NEUTROPHILS # BLD AUTO: 11 10E3/UL (ref 1.6–8.3)
NEUTROPHILS NFR BLD AUTO: 82 %
NITRATE UR QL: NEGATIVE
NRBC # BLD AUTO: 0 10E3/UL
NRBC BLD AUTO-RTO: 0 /100
PH UR STRIP: 7.5 [PH] (ref 5–7)
PLATELET # BLD AUTO: 331 10E3/UL (ref 150–450)
POTASSIUM SERPL-SCNC: 4.5 MMOL/L (ref 3.4–5.3)
PROT SERPL-MCNC: 7.7 G/DL (ref 6.4–8.3)
RBC # BLD AUTO: 5.02 10E6/UL (ref 3.8–5.2)
RBC URINE: 1 /HPF
SODIUM SERPL-SCNC: 141 MMOL/L (ref 136–145)
SP GR UR STRIP: 1.01 (ref 1–1.03)
SQUAMOUS EPITHELIAL: 2 /HPF
UROBILINOGEN UR STRIP-MCNC: <2 MG/DL
WBC # BLD AUTO: 13.3 10E3/UL (ref 4–11)
WBC URINE: 3 /HPF

## 2023-06-07 PROCEDURE — 99285 EMERGENCY DEPT VISIT HI MDM: CPT | Mod: 25

## 2023-06-07 PROCEDURE — 82248 BILIRUBIN DIRECT: CPT | Performed by: EMERGENCY MEDICINE

## 2023-06-07 PROCEDURE — 96361 HYDRATE IV INFUSION ADD-ON: CPT

## 2023-06-07 PROCEDURE — 96374 THER/PROPH/DIAG INJ IV PUSH: CPT | Mod: 59

## 2023-06-07 PROCEDURE — 81001 URINALYSIS AUTO W/SCOPE: CPT | Performed by: EMERGENCY MEDICINE

## 2023-06-07 PROCEDURE — 85048 AUTOMATED LEUKOCYTE COUNT: CPT | Performed by: EMERGENCY MEDICINE

## 2023-06-07 PROCEDURE — G0378 HOSPITAL OBSERVATION PER HR: HCPCS

## 2023-06-07 PROCEDURE — 255N000002 HC RX 255 OP 636: Performed by: EMERGENCY MEDICINE

## 2023-06-07 PROCEDURE — 80053 COMPREHEN METABOLIC PANEL: CPT | Performed by: EMERGENCY MEDICINE

## 2023-06-07 PROCEDURE — 74183 MRI ABD W/O CNTR FLWD CNTR: CPT

## 2023-06-07 PROCEDURE — 83690 ASSAY OF LIPASE: CPT | Performed by: EMERGENCY MEDICINE

## 2023-06-07 PROCEDURE — A9585 GADOBUTROL INJECTION: HCPCS | Performed by: EMERGENCY MEDICINE

## 2023-06-07 PROCEDURE — 84703 CHORIONIC GONADOTROPIN ASSAY: CPT | Performed by: EMERGENCY MEDICINE

## 2023-06-07 PROCEDURE — 74177 CT ABD & PELVIS W/CONTRAST: CPT

## 2023-06-07 PROCEDURE — 258N000003 HC RX IP 258 OP 636: Performed by: EMERGENCY MEDICINE

## 2023-06-07 PROCEDURE — 99223 1ST HOSP IP/OBS HIGH 75: CPT | Performed by: EMERGENCY MEDICINE

## 2023-06-07 PROCEDURE — 250N000011 HC RX IP 250 OP 636: Performed by: EMERGENCY MEDICINE

## 2023-06-07 PROCEDURE — 120N000001 HC R&B MED SURG/OB

## 2023-06-07 PROCEDURE — 36415 COLL VENOUS BLD VENIPUNCTURE: CPT | Performed by: EMERGENCY MEDICINE

## 2023-06-07 RX ORDER — MAGNESIUM OXIDE 400 MG/1
400 TABLET ORAL DAILY
COMMUNITY
End: 2024-01-02

## 2023-06-07 RX ORDER — ONDANSETRON 4 MG/1
4 TABLET, ORALLY DISINTEGRATING ORAL EVERY 6 HOURS PRN
Status: DISCONTINUED | OUTPATIENT
Start: 2023-06-07 | End: 2023-06-09 | Stop reason: HOSPADM

## 2023-06-07 RX ORDER — ACETAMINOPHEN 650 MG/1
650 SUPPOSITORY RECTAL EVERY 6 HOURS PRN
Status: DISCONTINUED | OUTPATIENT
Start: 2023-06-07 | End: 2023-06-09 | Stop reason: HOSPADM

## 2023-06-07 RX ORDER — LIDOCAINE 40 MG/G
CREAM TOPICAL
Status: DISCONTINUED | OUTPATIENT
Start: 2023-06-07 | End: 2023-06-09 | Stop reason: HOSPADM

## 2023-06-07 RX ORDER — ONDANSETRON 2 MG/ML
4 INJECTION INTRAMUSCULAR; INTRAVENOUS EVERY 6 HOURS PRN
Status: DISCONTINUED | OUTPATIENT
Start: 2023-06-07 | End: 2023-06-09 | Stop reason: HOSPADM

## 2023-06-07 RX ORDER — MORPHINE SULFATE 4 MG/ML
6 INJECTION, SOLUTION INTRAMUSCULAR; INTRAVENOUS ONCE
Status: COMPLETED | OUTPATIENT
Start: 2023-06-07 | End: 2023-06-07

## 2023-06-07 RX ORDER — GADOBUTROL 604.72 MG/ML
10 INJECTION INTRAVENOUS ONCE
Status: COMPLETED | OUTPATIENT
Start: 2023-06-07 | End: 2023-06-07

## 2023-06-07 RX ORDER — SODIUM CHLORIDE 9 MG/ML
INJECTION, SOLUTION INTRAVENOUS CONTINUOUS
Status: DISCONTINUED | OUTPATIENT
Start: 2023-06-07 | End: 2023-06-08

## 2023-06-07 RX ORDER — ACETAMINOPHEN 325 MG/1
650 TABLET ORAL EVERY 6 HOURS PRN
Status: DISCONTINUED | OUTPATIENT
Start: 2023-06-07 | End: 2023-06-09 | Stop reason: HOSPADM

## 2023-06-07 RX ORDER — ONDANSETRON 2 MG/ML
4 INJECTION INTRAMUSCULAR; INTRAVENOUS ONCE
Status: COMPLETED | OUTPATIENT
Start: 2023-06-07 | End: 2023-06-07

## 2023-06-07 RX ORDER — B-COMPLEX WITH VITAMIN C
250 TABLET ORAL DAILY
COMMUNITY
End: 2024-01-02

## 2023-06-07 RX ORDER — IOPAMIDOL 755 MG/ML
90 INJECTION, SOLUTION INTRAVASCULAR ONCE
Status: COMPLETED | OUTPATIENT
Start: 2023-06-07 | End: 2023-06-07

## 2023-06-07 RX ADMIN — IOPAMIDOL 90 ML: 755 INJECTION, SOLUTION INTRAVENOUS at 16:36

## 2023-06-07 RX ADMIN — GADOBUTROL 10 ML: 604.72 INJECTION INTRAVENOUS at 21:51

## 2023-06-07 RX ADMIN — SODIUM CHLORIDE: 9 INJECTION, SOLUTION INTRAVENOUS at 20:55

## 2023-06-07 RX ADMIN — ONDANSETRON 4 MG: 2 INJECTION INTRAMUSCULAR; INTRAVENOUS at 15:21

## 2023-06-07 RX ADMIN — SODIUM CHLORIDE 1000 ML: 9 INJECTION, SOLUTION INTRAVENOUS at 15:29

## 2023-06-07 ASSESSMENT — ACTIVITIES OF DAILY LIVING (ADL)
DIFFICULTY_EATING/SWALLOWING: NO
TOILETING_ISSUES: NO
ADLS_ACUITY_SCORE: 35
WALKING_OR_CLIMBING_STAIRS_DIFFICULTY: NO
DRESSING/BATHING_DIFFICULTY: NO
VISION_MANAGEMENT: GLASSES
WEAR_GLASSES_OR_BLIND: YES
ADLS_ACUITY_SCORE: 35
ADLS_ACUITY_SCORE: 35
DOING_ERRANDS_INDEPENDENTLY_DIFFICULTY: NO
CONCENTRATING,_REMEMBERING_OR_MAKING_DECISIONS_DIFFICULTY: NO
ADLS_ACUITY_SCORE: 20
CHANGE_IN_FUNCTIONAL_STATUS_SINCE_ONSET_OF_CURRENT_ILLNESS/INJURY: NO
FALL_HISTORY_WITHIN_LAST_SIX_MONTHS: NO

## 2023-06-07 NOTE — TELEPHONE ENCOUNTER
"Triage Call:     Pt calling to report that she recently had her gallbladder removed and she is now feeling pain in her upper stomach, chest pain, and shoulder blade area.     She rates her pain a 6-7/10. The pain is making it \"hard for her to breathe\" but is able to speak in full sentences and his not short of breath    Disposition: ED. Pt was given the advisement to return to the ED    Reason for Disposition    Chest pain    Pain also in shoulder(s) or arm(s) or jaw    Additional Information    Negative: Passed out (i.e., fainted, collapsed and was not responding)    Negative: Shock suspected (e.g., cold/pale/clammy skin, too weak to stand, low BP, rapid pulse)    Negative: Visible sweat on face or sweat is dripping down    Negative: SEVERE difficulty breathing (e.g., struggling for each breath, speaks in single words)    Negative: Passed out (i.e., fainted, collapsed and was not responding)    Negative: Difficult to awaken or acting confused (e.g., disoriented, slurred speech)    Negative: Shock suspected (e.g., cold/pale/clammy skin, too weak to stand, low BP, rapid pulse)    Negative: Chest pain lasting longer than 5 minutes and ANY of the following:* Over 44 years old* Over 30 years old and at least one cardiac risk factor (e.g., diabetes mellitus, high blood pressure, high cholesterol, smoker, or strong family history of heart disease)* History of heart disease (i.e., angina, heart attack, heart failure, bypass surgery, takes nitroglycerin)* Pain is crushing, pressure-like, or heavy    Negative: Visible sweat on face or sweat dripping down face    Negative: Sounds like a life-threatening emergency to the triager    Negative: Heart beating < 50 beats per minute OR > 140 beats per minute    Negative: Followed an injury to chest    Negative: SEVERE chest pain    Protocols used: ABDOMINAL PAIN - UPPER-A-OH, CHEST PAIN-A-OH    Meenu Gomez RN  [facility] Nurse Advisor 1:42 PM 6/7/2023  "

## 2023-06-07 NOTE — ED PROVIDER NOTES
EMERGENCY DEPARTMENT ENCOUNTER      NAME: Amanda Miller  AGE: 22 year old female  YOB: 2000  MRN: [id number]  EVALUATION DATE & TIME: No admission date for patient encounter.    PCP: Bety Zepeda    ED PROVIDER: Boom Lowe M.D.      Chief Complaint   Patient presents with     Abdominal Pain         FINAL IMPRESSION:  1. Epigastric pain          ED COURSE & MEDICAL DECISION MAKIN:45 PM I introduced myself to the patient, obtained patient history, performed a physical exam, and discussed plan for ED workup including potential diagnostic laboratory/imaging studies and interventions.  6:04 PM I spoke with Surgery Dr. Almazan regarding patient.   6:15 PM I spoke with the hospitalist, Dr. Miguel.       22 year old female presents to the Emergency Department for evaluation of epigastric pain and back pain.  Patient has a history of recent admission for gallstone pancreatitis and is status postcholecystectomy during that admission.  She presents with sudden onset of epigastric and upper back pain reminiscent of her previous admission.  She arrives to the emergency department vitally stable and afebrile.  She underwent a work-up as below.  This included CT without any evidence of postsurgical complication.  Labs were notable for mildly elevated white blood cell count, normal lipase, and mildly elevated liver function test.  Of note her alkaline phosphatase level has significantly risen since discharge, AST is somewhat higher as well.  I think based on her symptoms there is a relatively high clinical concern for something like retained gallstone.  We discussed keeping her in the hospital to pursue MRI imaging, keep her n.p.o. at midnight and we will trend liver function test with possible GI consultation especially if persistent symptoms or other abnormalities on further testing.  Patient was in agreement with this approach.  I also confirmed the plan with Dr Almazan of general  surgery.  Discussed case with hospitalist    At the conclusion of the encounter I discussed the results of all of the tests and the disposition. The questions were answered. The patient or family acknowledged understanding and was agreeable with the care plan.       Medical Decision Making    History:    Supplemental history from: Documented in chart, if applicable    External Record(s) reviewed: Documented in chart, if applicable.    Work Up:    Chart documentation includes differential considered and any EKGs or imaging independently interpreted by provider, where specified.    In additional to work up documented, I considered the following work up: Documented in chart, if applicable.    External consultation:    Discussion of management with another provider: Documented in chart, if applicable    Complicating factors:    Care impacted by chronic illness: N/A    Care affected by social determinants of health: N/A    Disposition considerations: Admit.            MEDICATIONS GIVEN IN THE EMERGENCY:  Medications   sodium chloride 0.9% infusion ( Intravenous $New Bag 6/7/23 2055)   lidocaine 1 % 0.1-1 mL (has no administration in time range)   lidocaine (LMX4) cream (has no administration in time range)   sodium chloride (PF) 0.9% PF flush 3 mL (3 mLs Intracatheter $Given 6/7/23 2056)   sodium chloride (PF) 0.9% PF flush 3 mL (has no administration in time range)   melatonin tablet 1 mg (has no administration in time range)   acetaminophen (TYLENOL) tablet 650 mg (has no administration in time range)     Or   acetaminophen (TYLENOL) Suppository 650 mg (has no administration in time range)   ondansetron (ZOFRAN ODT) ODT tab 4 mg (has no administration in time range)     Or   ondansetron (ZOFRAN) injection 4 mg (has no administration in time range)   morphine (PF) injection 6 mg (6 mg Intravenous Not Given 6/7/23 1751)   ondansetron (ZOFRAN) injection 4 mg (4 mg Intravenous $Given 6/7/23 1521)   0.9% sodium chloride  "BOLUS (0 mLs Intravenous Stopped 6/7/23 1623)   iopamidol (ISOVUE-370) solution 90 mL (90 mLs Intravenous $Given 6/7/23 1636)   gadobutrol (GADAVIST) injection 10 mL (10 mLs Intravenous $Given 6/7/23 2151)       NEW PRESCRIPTIONS STARTED AT TODAY'S ER VISIT  Current Discharge Medication List             =================================================================    HPI    Patient information was obtained from: Patient     Use of : N/A         Amanda Miller is a 22 year old female with a pertinent history of cholecystitis, cholelithiasis and laparoscopic cholecystectomy, who presents to this ED via walk in for evaluation of abdominal pain.    Per chart review, the patient was admitted to [facility] from 5/30/2023 to 6/2/2023 (3 days). The patient initially presented to the ED for evaluation of abdominal pain. While in the ED Labs were pointing to acute biliary pancreatitis with an elevated lipase to 2200, mildly elevated bilirubin and liver function tests. Ultrasound of her right upper quadrant showed concern for cholecystitis. Patient was started on Zosyn in the ED and admitted. While admitted patient had a s/p lap cholecystectomy on 6/1/2023. She received Zosyn and was continued on IV fluids along with IV dilaudid. After being medically stable patient was discharged.     Patient reports sudden onset epigastric pain that began earlier today after eating. States that the pain radiates to her back between her shoulder blades. She was recently admitted for the same pain and had a cholecystectomy. Her incision is healing fine and she was doing well with no symptoms until today. Denies any fever.     REVIEW OF SYSTEMS   All systems reviewed and negative except as noted in HPI.    PAST MEDICAL HISTORY:  Past Medical History:   Diagnosis Date     Anxiety      Depression      Depressive disorder     no medications currently, per pt \"midwife would like me to start again after pregnancy\"     " Obesity      Urinary tract infection        PAST SURGICAL HISTORY:  Past Surgical History:   Procedure Laterality Date     CHOLANGIOGRAM N/A 6/1/2023    Procedure: INTRAOPERATIVE CHOLANGIOGRAMS;  Surgeon: Dimas Cloud DO;  [place] [facility]     LAPAROSCOPIC CHOLECYSTECTOMY N/A 6/1/2023    Procedure: CHOLECYSTECTOMY, ROBOT-ASSISTED, LAPAROSCOPIC, USING DA LINDSAY XI,;  Surgeon: Dimas Cloud DO;  [place] [facility] [place]     TONSILLECTOMY       WISDOM TOOTH EXTRACTION             CURRENT MEDICATIONS:    Current Facility-Administered Medications   Medication     acetaminophen (TYLENOL) tablet 650 mg    Or     acetaminophen (TYLENOL) Suppository 650 mg     lidocaine (LMX4) cream     lidocaine 1 % 0.1-1 mL     melatonin tablet 1 mg     ondansetron (ZOFRAN ODT) ODT tab 4 mg    Or     ondansetron (ZOFRAN) injection 4 mg     sodium chloride (PF) 0.9% PF flush 3 mL     sodium chloride (PF) 0.9% PF flush 3 mL     sodium chloride 0.9% infusion         ALLERGIES:  No Known Allergies    FAMILY HISTORY:  Family History   Problem Relation Age of Onset     Depression Mother      Depression Father      Cancer Paternal Grandmother        SOCIAL HISTORY:   Social History     Socioeconomic History     Marital status:      Spouse name: Roslyn     Number of children: 1     Highest education level: Some college, no degree   Tobacco Use     Smoking status: Never     Smokeless tobacco: Never   Vaping Use     Vaping status: Never Used   Substance and Sexual Activity     Alcohol use: Not Currently     Comment: occas     Drug use: Not Currently     Sexual activity: Yes     Partners: Male     Birth control/protection: None       VITALS:  /71 (BP Location: Right arm)   Pulse 53   Temp 97.6  F (36.4  C) (Oral)   Resp 16   Wt 97.6 kg (215 lb 2.7 oz)   LMP 07/06/2022   SpO2 99%   BMI 42.02 kg/m      PHYSICAL EXAM    Constitutional: Well developed, Well nourished, NAD.  HENT: Normocephalic, Atraumatic. Neck  Supple.  Eyes: EOMI, Conjunctiva normal.  Respiratory: Breathing comfortably on room air. Speaks full sentences easily. Lungs clear to ascultation.  Cardiovascular: Normal heart rate, Regular rhythm. No peripheral edema.  Abdomen: Soft, mild tenderness to deep palpation in the epigastric and right upper quadrant area.  Laparoscopic incisions are well-healing  Musculoskeletal: Good range of motion in all major joints. No major deformities noted.  Integument: Warm, Dry.  Neurologic: Alert & awake, Normal motor function, Normal sensory function, No focal deficits noted.   Psychiatric: Cooperative. Affect appropriate.     LAB:  All pertinent labs reviewed and interpreted.  Labs Ordered and Resulted from Time of ED Arrival to Time of ED Departure   BASIC METABOLIC PANEL - Abnormal       Result Value    Sodium 141      Potassium 4.5      Chloride 103      Carbon Dioxide (CO2) 26      Anion Gap 12      Urea Nitrogen 11.4      Creatinine 0.73      Calcium 10.2 (*)     Glucose 110 (*)     GFR Estimate >90     LIPASE - Abnormal    Lipase 108 (*)    ROUTINE UA WITH MICROSCOPIC REFLEX TO CULTURE - Abnormal    Color Urine Light Yellow      Appearance Urine Clear      Glucose Urine Negative      Bilirubin Urine Negative      Ketones Urine Negative      Specific Gravity Urine 1.010      Blood Urine Negative      pH Urine 7.5 (*)     Protein Albumin Urine Negative      Urobilinogen Urine <2.0      Nitrite Urine Negative      Leukocyte Esterase Urine Negative      RBC Urine 1      WBC Urine 3      Squamous Epithelials Urine 2 (*)    CBC WITH PLATELETS AND DIFFERENTIAL - Abnormal    WBC Count 13.3 (*)     RBC Count 5.02      Hemoglobin 13.3      Hematocrit 42.3      MCV 84      MCH 26.5      MCHC 31.4 (*)     RDW 13.3      Platelet Count 331      % Neutrophils 82      % Lymphocytes 11      % Monocytes 5      % Eosinophils 1      % Basophils 0      % Immature Granulocytes 1      NRBCs per 100 WBC 0      Absolute Neutrophils 11.0 (*)      Absolute Lymphocytes 1.4      Absolute Monocytes 0.7      Absolute Eosinophils 0.1      Absolute Basophils 0.0      Absolute Immature Granulocytes 0.1      Absolute NRBCs 0.0     HEPATIC FUNCTION PANEL - Abnormal    Protein Total 7.7      Albumin 4.3      Bilirubin Total 0.7      Alkaline Phosphatase 328 (*)     AST 99 (*)     ALT 76 (*)     Bilirubin Direct 0.35 (*)    HCG QUALITATIVE PREGNANCY - Normal    hCG Serum Qualitative Negative         RADIOLOGY:  Reviewed all pertinent imaging. Please see official radiology report.  MR Abdomen MRCP w/o & w Contrast   Final Result   IMPRESSION:   1.  No evidence of retained stones post cholecystectomy.      CT Abdomen Pelvis w Contrast   Final Result   IMPRESSION:    1.  Expected postoperative appearance after cholecystectomy with mild amount of soft tissue stranding and minimal fluid density at gallbladder bed but no concerning collection.   2.  Liver, bile ducts and pancreas otherwise appear normal.            I, Ursula Ulloa, am serving as a scribe to document services personally performed by Dr. Boom Lowe, based on my observation and the provider's statements to me. I, Boom Lowe MD attest that Ursula Ulloa is acting in a scribe capacity, has observed my performance of the services and has documented them in accordance with my direction.    Boom Lowe M.D.  Emergency Medicine  [facility] EMERGENCY DEPARTMENT  [facility] [facility] [id number] [phone]  Dept: [phone]     Boom Lowe MD  06/08/23 0007

## 2023-06-07 NOTE — PHARMACY-ADMISSION MEDICATION HISTORY
Pharmacist Admission Medication History    Admission medication history is complete. The information provided in this note is only as accurate as the sources available at the time of the update.    Medication reconciliation/reorder completed by provider prior to medication history? No    Information Source(s): Patient and Patient's pharmacy via in-person    Pertinent Information:     Changes made to PTA medication list:    Added: Magnesium, Vit B1    Deleted: Doxusate 100 mg and 50 mg    Changed: None    Medication Affordability:       Allergies reviewed with patient and updates made in EHR: yes    Medication History Completed By: Shiva Long PharmD 6/7/2023 6:54 PM    Prior to Admission medications    Medication Sig Last Dose Taking? Auth Provider Long Term End Date   acetaminophen (TYLENOL) 500 MG tablet Take 500-1,000 mg by mouth every 6 hours as needed for mild pain 6/7/2023 at 1400 Yes Unknown, Entered By History No    escitalopram (LEXAPRO) 10 MG tablet Take 1 tablet (10 mg) by mouth daily 6/6/2023 at noon Yes Theresa Osuna APRN CNM Yes    ibuprofen (ADVIL/MOTRIN) 600 MG tablet Take 1 tablet (600 mg) by mouth every 6 hours as needed for other (cramping) 6/6/2023 Yes Theresa Osuna APRN CNM     magnesium oxide (MAG-OX) 400 MG tablet Take 400 mg by mouth daily 6/6/2023 at noon Yes Unknown, Entered By History     Prenatal Vit-Fe Fumarate-FA (PRENATAL VITAMIN PO) Take 1 tablet by mouth daily 6/6/2023 at 1200 Yes Reported, Patient     vitamin B1 (THIAMINE) 250 MG tablet Take 250 mg by mouth daily 6/6/2023 at noon Yes Unknown, Entered By History

## 2023-06-07 NOTE — PROGRESS NOTES
Chart reviewed, 6 weeks postpartum, no svcs and no needs. Supportive spouse. CM to follow pending post surgery needs.

## 2023-06-07 NOTE — ED TRIAGE NOTES
Pt presents to triage ambulatory for abdominal pain. Pt is s/p shama jaquez on 6/01. Today after pt ate some cereal she started having epigastric pain radiating to back. +Nausea no vomiting.

## 2023-06-07 NOTE — ED NOTES
".[facility] ED Handoff Report    ED Chief Complaint: Pt presents to triage ambulatory for abdominal pain. Pt is s/p shama jaquez on 6/01. Today after pt ate some cereal she started having epigastric pain radiating to back. +Nausea no vomiting.     ED Diagnosis:  (R10.13) Epigastric pain  Comment: CT unremarkable, LFTs elevated  Plan: MRCP       PMH:    Past Medical History:   Diagnosis Date    Anxiety     Depression     Depressive disorder     no medications currently, per pt \"midwife would like me to start again after pregnancy\"    Obesity     Urinary tract infection         Code Status:  Prior     Falls Risk: No Band: Not applicable    Current Living Situation/Residence: lives with a significant other     Elimination Status: Continent: Yes     Activity Level: SBA    Patients Preferred Language:  English     Needed: No    Vital Signs:  /52   Pulse 73   Temp 98  F (36.7  C) (Oral)   Resp 16   Wt 99.8 kg (220 lb)   LMP 07/06/2022   SpO2 94%   BMI 42.97 kg/m       Cardiac Rhythm: N/A    Pain Score: 5/10    Is the Patient Confused:  No    Last Food or Drink: 06/07/23 at 1400    Focused Assessment:  GI    Tests Performed: Done: Labs and Imaging    Treatments Provided:  Supportive    Family Dynamics/Concerns: No    Family Updated On Visitor Policy: Yes    Plan of Care Communicated to Family: Yes    Who Was Updated about Plan of Care: Spouse at bedside    Belongings Checklist Done and Signed by Patient: No    Medications sent with patient: None to send at this time    Covid: asymptomatic , Not tested    Additional Information: Patient is currently breastfeeding and pumping. Patient has refused narcotics in ED due to nursing.    RN: Kacie yCr RN 6/7/2023 6:38 PM      "

## 2023-06-08 ENCOUNTER — ANESTHESIA (OUTPATIENT)
Dept: SURGERY | Facility: HOSPITAL | Age: 23
End: 2023-06-08
Payer: COMMERCIAL

## 2023-06-08 ENCOUNTER — ANESTHESIA EVENT (OUTPATIENT)
Dept: SURGERY | Facility: HOSPITAL | Age: 23
End: 2023-06-08
Payer: COMMERCIAL

## 2023-06-08 LAB
ALBUMIN SERPL BCG-MCNC: 4 G/DL (ref 3.5–5.2)
ALP SERPL-CCNC: 308 U/L (ref 35–104)
ALT SERPL W P-5'-P-CCNC: 189 U/L (ref 10–35)
ANION GAP SERPL CALCULATED.3IONS-SCNC: 11 MMOL/L (ref 7–15)
AST SERPL W P-5'-P-CCNC: 147 U/L (ref 10–35)
BILIRUB SERPL-MCNC: 0.6 MG/DL
BUN SERPL-MCNC: 8.2 MG/DL (ref 6–20)
CALCIUM SERPL-MCNC: 9.4 MG/DL (ref 8.6–10)
CHLORIDE SERPL-SCNC: 105 MMOL/L (ref 98–107)
CREAT SERPL-MCNC: 0.67 MG/DL (ref 0.51–0.95)
DEPRECATED HCO3 PLAS-SCNC: 23 MMOL/L (ref 22–29)
ERYTHROCYTE [DISTWIDTH] IN BLOOD BY AUTOMATED COUNT: 13.6 % (ref 10–15)
GFR SERPL CREATININE-BSD FRML MDRD: >90 ML/MIN/1.73M2
GLUCOSE SERPL-MCNC: 82 MG/DL (ref 70–99)
HCT VFR BLD AUTO: 40.6 % (ref 35–47)
HGB BLD-MCNC: 12.8 G/DL (ref 11.7–15.7)
LIPASE SERPL-CCNC: 676 U/L (ref 13–60)
MCH RBC QN AUTO: 26.5 PG (ref 26.5–33)
MCHC RBC AUTO-ENTMCNC: 31.5 G/DL (ref 31.5–36.5)
MCV RBC AUTO: 84 FL (ref 78–100)
PLATELET # BLD AUTO: 290 10E3/UL (ref 150–450)
POTASSIUM SERPL-SCNC: 4.2 MMOL/L (ref 3.4–5.3)
PROT SERPL-MCNC: 7 G/DL (ref 6.4–8.3)
RBC # BLD AUTO: 4.83 10E6/UL (ref 3.8–5.2)
SODIUM SERPL-SCNC: 139 MMOL/L (ref 136–145)
UPPER EUS: NORMAL
WBC # BLD AUTO: 8.2 10E3/UL (ref 4–11)

## 2023-06-08 PROCEDURE — 360N000076 HC SURGERY LEVEL 3, PER MIN: Performed by: INTERNAL MEDICINE

## 2023-06-08 PROCEDURE — 0DJ08ZZ INSPECTION OF UPPER INTESTINAL TRACT, VIA NATURAL OR ARTIFICIAL OPENING ENDOSCOPIC: ICD-10-PCS | Performed by: INTERNAL MEDICINE

## 2023-06-08 PROCEDURE — 120N000001 HC R&B MED SURG/OB

## 2023-06-08 PROCEDURE — 99222 1ST HOSP IP/OBS MODERATE 55: CPT | Performed by: SURGERY

## 2023-06-08 PROCEDURE — 36415 COLL VENOUS BLD VENIPUNCTURE: CPT | Performed by: EMERGENCY MEDICINE

## 2023-06-08 PROCEDURE — 272N000001 HC OR GENERAL SUPPLY STERILE: Performed by: INTERNAL MEDICINE

## 2023-06-08 PROCEDURE — 250N000009 HC RX 250: Performed by: ANESTHESIOLOGY

## 2023-06-08 PROCEDURE — G0378 HOSPITAL OBSERVATION PER HR: HCPCS

## 2023-06-08 PROCEDURE — 83690 ASSAY OF LIPASE: CPT | Performed by: EMERGENCY MEDICINE

## 2023-06-08 PROCEDURE — 258N000003 HC RX IP 258 OP 636: Performed by: INTERNAL MEDICINE

## 2023-06-08 PROCEDURE — 258N000003 HC RX IP 258 OP 636: Performed by: ANESTHESIOLOGY

## 2023-06-08 PROCEDURE — 250N000011 HC RX IP 250 OP 636: Performed by: ANESTHESIOLOGY

## 2023-06-08 PROCEDURE — 250N000011 HC RX IP 250 OP 636

## 2023-06-08 PROCEDURE — 370N000017 HC ANESTHESIA TECHNICAL FEE, PER MIN: Performed by: INTERNAL MEDICINE

## 2023-06-08 PROCEDURE — 710N000010 HC RECOVERY PHASE 1, LEVEL 2, PER MIN: Performed by: INTERNAL MEDICINE

## 2023-06-08 PROCEDURE — 80053 COMPREHEN METABOLIC PANEL: CPT | Performed by: EMERGENCY MEDICINE

## 2023-06-08 PROCEDURE — 250N000009 HC RX 250

## 2023-06-08 PROCEDURE — 85014 HEMATOCRIT: CPT | Performed by: EMERGENCY MEDICINE

## 2023-06-08 PROCEDURE — 258N000003 HC RX IP 258 OP 636: Performed by: EMERGENCY MEDICINE

## 2023-06-08 PROCEDURE — 99233 SBSQ HOSP IP/OBS HIGH 50: CPT | Performed by: EMERGENCY MEDICINE

## 2023-06-08 PROCEDURE — 999N000141 HC STATISTIC PRE-PROCEDURE NURSING ASSESSMENT: Performed by: INTERNAL MEDICINE

## 2023-06-08 RX ORDER — ONDANSETRON 2 MG/ML
4 INJECTION INTRAMUSCULAR; INTRAVENOUS EVERY 30 MIN PRN
Status: DISCONTINUED | OUTPATIENT
Start: 2023-06-08 | End: 2023-06-08 | Stop reason: HOSPADM

## 2023-06-08 RX ORDER — PROPOFOL 10 MG/ML
INJECTION, EMULSION INTRAVENOUS PRN
Status: DISCONTINUED | OUTPATIENT
Start: 2023-06-08 | End: 2023-06-08

## 2023-06-08 RX ORDER — NALOXONE HYDROCHLORIDE 0.4 MG/ML
0.2 INJECTION, SOLUTION INTRAMUSCULAR; INTRAVENOUS; SUBCUTANEOUS
Status: DISCONTINUED | OUTPATIENT
Start: 2023-06-08 | End: 2023-06-09 | Stop reason: HOSPADM

## 2023-06-08 RX ORDER — SODIUM CHLORIDE, SODIUM LACTATE, POTASSIUM CHLORIDE, CALCIUM CHLORIDE 600; 310; 30; 20 MG/100ML; MG/100ML; MG/100ML; MG/100ML
INJECTION, SOLUTION INTRAVENOUS CONTINUOUS
Status: DISCONTINUED | OUTPATIENT
Start: 2023-06-08 | End: 2023-06-08 | Stop reason: HOSPADM

## 2023-06-08 RX ORDER — HYDROMORPHONE HYDROCHLORIDE 1 MG/ML
0.2 INJECTION, SOLUTION INTRAMUSCULAR; INTRAVENOUS; SUBCUTANEOUS EVERY 5 MIN PRN
Status: DISCONTINUED | OUTPATIENT
Start: 2023-06-08 | End: 2023-06-08 | Stop reason: HOSPADM

## 2023-06-08 RX ORDER — MAGNESIUM OXIDE 400 MG/1
400 TABLET ORAL DAILY
Status: DISCONTINUED | OUTPATIENT
Start: 2023-06-08 | End: 2023-06-09 | Stop reason: HOSPADM

## 2023-06-08 RX ORDER — FENTANYL CITRATE 50 UG/ML
INJECTION, SOLUTION INTRAMUSCULAR; INTRAVENOUS PRN
Status: DISCONTINUED | OUTPATIENT
Start: 2023-06-08 | End: 2023-06-08

## 2023-06-08 RX ORDER — ONDANSETRON 2 MG/ML
INJECTION INTRAMUSCULAR; INTRAVENOUS PRN
Status: DISCONTINUED | OUTPATIENT
Start: 2023-06-08 | End: 2023-06-08

## 2023-06-08 RX ORDER — OXYCODONE HYDROCHLORIDE 5 MG/1
10 TABLET ORAL
Status: DISCONTINUED | OUTPATIENT
Start: 2023-06-08 | End: 2023-06-08 | Stop reason: HOSPADM

## 2023-06-08 RX ORDER — LIDOCAINE HYDROCHLORIDE 10 MG/ML
INJECTION, SOLUTION INFILTRATION; PERINEURAL PRN
Status: DISCONTINUED | OUTPATIENT
Start: 2023-06-08 | End: 2023-06-08

## 2023-06-08 RX ORDER — HYDROMORPHONE HYDROCHLORIDE 1 MG/ML
0.4 INJECTION, SOLUTION INTRAMUSCULAR; INTRAVENOUS; SUBCUTANEOUS EVERY 5 MIN PRN
Status: DISCONTINUED | OUTPATIENT
Start: 2023-06-08 | End: 2023-06-08 | Stop reason: HOSPADM

## 2023-06-08 RX ORDER — FENTANYL CITRATE 50 UG/ML
50 INJECTION, SOLUTION INTRAMUSCULAR; INTRAVENOUS EVERY 5 MIN PRN
Status: DISCONTINUED | OUTPATIENT
Start: 2023-06-08 | End: 2023-06-08 | Stop reason: HOSPADM

## 2023-06-08 RX ORDER — ONDANSETRON 4 MG/1
4 TABLET, ORALLY DISINTEGRATING ORAL EVERY 30 MIN PRN
Status: DISCONTINUED | OUTPATIENT
Start: 2023-06-08 | End: 2023-06-08 | Stop reason: HOSPADM

## 2023-06-08 RX ORDER — PROPOFOL 10 MG/ML
INJECTION, EMULSION INTRAVENOUS CONTINUOUS PRN
Status: DISCONTINUED | OUTPATIENT
Start: 2023-06-08 | End: 2023-06-08

## 2023-06-08 RX ORDER — NALOXONE HYDROCHLORIDE 0.4 MG/ML
0.4 INJECTION, SOLUTION INTRAMUSCULAR; INTRAVENOUS; SUBCUTANEOUS
Status: DISCONTINUED | OUTPATIENT
Start: 2023-06-08 | End: 2023-06-09 | Stop reason: HOSPADM

## 2023-06-08 RX ORDER — FENTANYL CITRATE 50 UG/ML
25 INJECTION, SOLUTION INTRAMUSCULAR; INTRAVENOUS EVERY 5 MIN PRN
Status: DISCONTINUED | OUTPATIENT
Start: 2023-06-08 | End: 2023-06-08 | Stop reason: HOSPADM

## 2023-06-08 RX ORDER — LIDOCAINE 40 MG/G
CREAM TOPICAL
Status: DISCONTINUED | OUTPATIENT
Start: 2023-06-08 | End: 2023-06-08 | Stop reason: HOSPADM

## 2023-06-08 RX ORDER — FLUMAZENIL 0.1 MG/ML
0.2 INJECTION, SOLUTION INTRAVENOUS
Status: ACTIVE | OUTPATIENT
Start: 2023-06-08 | End: 2023-06-09

## 2023-06-08 RX ORDER — ESCITALOPRAM OXALATE 10 MG/1
10 TABLET ORAL DAILY
Status: DISCONTINUED | OUTPATIENT
Start: 2023-06-08 | End: 2023-06-09 | Stop reason: HOSPADM

## 2023-06-08 RX ORDER — PRENATAL VIT/IRON FUM/FOLIC AC 27MG-0.8MG
TABLET ORAL DAILY
Status: DISCONTINUED | OUTPATIENT
Start: 2023-06-08 | End: 2023-06-09 | Stop reason: HOSPADM

## 2023-06-08 RX ORDER — GLYCOPYRROLATE 0.2 MG/ML
INJECTION, SOLUTION INTRAMUSCULAR; INTRAVENOUS PRN
Status: DISCONTINUED | OUTPATIENT
Start: 2023-06-08 | End: 2023-06-08

## 2023-06-08 RX ORDER — OXYCODONE HYDROCHLORIDE 5 MG/1
5 TABLET ORAL
Status: DISCONTINUED | OUTPATIENT
Start: 2023-06-08 | End: 2023-06-08 | Stop reason: HOSPADM

## 2023-06-08 RX ORDER — DEXAMETHASONE SODIUM PHOSPHATE 10 MG/ML
INJECTION, SOLUTION INTRAMUSCULAR; INTRAVENOUS PRN
Status: DISCONTINUED | OUTPATIENT
Start: 2023-06-08 | End: 2023-06-08

## 2023-06-08 RX ADMIN — ONDANSETRON 4 MG: 2 INJECTION INTRAMUSCULAR; INTRAVENOUS at 14:41

## 2023-06-08 RX ADMIN — SODIUM CHLORIDE: 9 INJECTION, SOLUTION INTRAVENOUS at 08:38

## 2023-06-08 RX ADMIN — GLYCOPYRROLATE 0.2 MG: 0.2 INJECTION INTRAMUSCULAR; INTRAVENOUS at 14:09

## 2023-06-08 RX ADMIN — SODIUM CHLORIDE, POTASSIUM CHLORIDE, SODIUM LACTATE AND CALCIUM CHLORIDE: 600; 310; 30; 20 INJECTION, SOLUTION INTRAVENOUS at 13:58

## 2023-06-08 RX ADMIN — SODIUM CHLORIDE: 9 INJECTION, SOLUTION INTRAVENOUS at 18:10

## 2023-06-08 RX ADMIN — FENTANYL CITRATE 100 MCG: 50 INJECTION, SOLUTION INTRAMUSCULAR; INTRAVENOUS at 14:07

## 2023-06-08 RX ADMIN — SUGAMMADEX 200 MG: 100 INJECTION, SOLUTION INTRAVENOUS at 14:51

## 2023-06-08 RX ADMIN — LIDOCAINE HYDROCHLORIDE 50 MG: 10 INJECTION, SOLUTION INFILTRATION; PERINEURAL at 14:26

## 2023-06-08 RX ADMIN — PROPOFOL 150 MCG/KG/MIN: 10 INJECTION, EMULSION INTRAVENOUS at 14:09

## 2023-06-08 RX ADMIN — LIDOCAINE HYDROCHLORIDE 50 MG: 10 INJECTION, SOLUTION INFILTRATION; PERINEURAL at 14:07

## 2023-06-08 RX ADMIN — ROCURONIUM BROMIDE 50 MG: 50 INJECTION, SOLUTION INTRAVENOUS at 14:09

## 2023-06-08 RX ADMIN — DEXAMETHASONE SODIUM PHOSPHATE 10 MG: 10 INJECTION, SOLUTION INTRAMUSCULAR; INTRAVENOUS at 14:35

## 2023-06-08 RX ADMIN — MIDAZOLAM 2 MG: 1 INJECTION INTRAMUSCULAR; INTRAVENOUS at 13:58

## 2023-06-08 RX ADMIN — PROPOFOL 140 MG: 10 INJECTION, EMULSION INTRAVENOUS at 14:07

## 2023-06-08 ASSESSMENT — ACTIVITIES OF DAILY LIVING (ADL)
ADLS_ACUITY_SCORE: 20

## 2023-06-08 NOTE — H&P
GENERAL PRE-PROCEDURE:   Procedure:  EUS/ERCP - Rule out choledocholithiasis  Date/Time:  6/8/2023 1:49 PM    Verbal consent obtained?: Yes    Written consent obtained?: Yes    Risks and benefits: Risks, benefits and alternatives were discussed    Consent given by:  Patient  Patient states understanding of procedure being performed: Yes    Patient's understanding of procedure matches consent: Yes    Procedure consent matches procedure scheduled: Yes    Expected level of sedation:  Deep  Appropriately NPO:  Yes  ASA Class:  3  Mallampati  :  Grade 3- soft palate visible, posterior pharyngeal wall not visible  Lungs:  Lungs clear with good breath sounds bilaterally  Heart:  Normal heart sounds and rate  History & Physical reviewed:  History and physical reviewed and no updates needed  Statement of review:  I have reviewed the lab findings, diagnostic data, medications, and the plan for sedation

## 2023-06-08 NOTE — PLAN OF CARE
Pt denied pain this shift. No prn medication needed/given.  Pt reported feeling tired and slept on/off.  present and supportive.  GI consulted. An EGD/ERCP was ordered. Pt left unit at 1300 to have this done.

## 2023-06-08 NOTE — PLAN OF CARE
Problem: Pain Acute  Goal: Optimal Pain Control and Function  Outcome: Progressing   Goal Outcome Evaluation:       Denies pain or discomfort. MRCP completed this evening.  at bedside.

## 2023-06-08 NOTE — CONSULTS
"  GI CONSULT NOTE      Name: Amanda Miller  Medical Record #: [id number]  YOB: 2000  Date of Admission: 6/7/2023  Date/Time: 6/8/2023/11:31 AM     CHIEF COMPLAINT: Epigastric abdominal pain, elevated LFTs    HISTORY OF PRESENT ILLNESS: This is a 22 year old year old  patient seen at the request of Dr. Miguel with a history of depression, obesity, recent vaginal delivery April 29.  She had intermittent abdominal pain previously and was admitted May 31 with gallstone pancreatitis.  Ultrasound showed gallstones and normal common bile duct.  Laparoscopic cholecystectomy was done June 1 with a negative IOC.  Her pain improved and she was discharged June 2.    Yesterday her pain recurred after eating, described as pain in the epigastrium with some radiation into her back.  She had nausea but no vomiting.  Prior to yesterday she was eating well without any symptoms.  She denies starting any new medications recently and has not taken antibiotics.  Today her pain is significantly improved however LFTs remain elevated.    PAST MEDICAL HISTORY:  Past Medical History:   Diagnosis Date     Anxiety      Depression      Depressive disorder     no medications currently, per pt \"midwife would like me to start again after pregnancy\"     Obesity      Urinary tract infection        FAMILY HISTORY:  Family History   Problem Relation Age of Onset     Depression Mother      Depression Father      Cancer Paternal Grandmother        SOCIAL HISTORY:  Social History     Socioeconomic History     Marital status:      Spouse name: Roslyn     Number of children: 1     Years of education: Not on file     Highest education level: Some college, no degree   Occupational History     Not on file   Tobacco Use     Smoking status: Never     Smokeless tobacco: Never   Vaping Use     Vaping status: Never Used   Substance and Sexual Activity     Alcohol use: Not Currently     Comment: occas     Drug use: Not Currently     " Sexual activity: Yes     Partners: Male     Birth control/protection: None   Other Topics Concern     Not on file   Social History Narrative     Not on file     Social Determinants of Health     Financial Resource Strain: Not on file   Food Insecurity: Not on file   Transportation Needs: Not on file   Physical Activity: Not on file   Stress: Not on file   Social Connections: Not on file   Intimate Partner Violence: Not on file   Housing Stability: Not on file       MEDICATIONS PRIOR TO ADMISSION:   Medications Prior to Admission   Medication Sig Dispense Refill Last Dose     acetaminophen (TYLENOL) 500 MG tablet Take 500-1,000 mg by mouth every 6 hours as needed for mild pain   6/7/2023 at 1400     escitalopram (LEXAPRO) 10 MG tablet Take 1 tablet (10 mg) by mouth daily 30 tablet 0 6/6/2023 at noon     ibuprofen (ADVIL/MOTRIN) 600 MG tablet Take 1 tablet (600 mg) by mouth every 6 hours as needed for other (cramping)   6/6/2023     magnesium oxide (MAG-OX) 400 MG tablet Take 400 mg by mouth daily   6/6/2023 at noon     Prenatal Vit-Fe Fumarate-FA (PRENATAL VITAMIN PO) Take 1 tablet by mouth daily   6/6/2023 at 1200     vitamin B1 (THIAMINE) 250 MG tablet Take 250 mg by mouth daily   6/6/2023 at noon          ALLERGIES: Patient has no known allergies.    REVIEW OF SYSTEMS (ROS): Complete review of systems negative other than listed in HPI.    PHYSICAL EXAM:    /58 (BP Location: Right arm)   Pulse 52   Temp 98  F (36.7  C) (Oral)   Resp 16   Wt 97.6 kg (215 lb 2.7 oz)   LMP 07/06/2022   SpO2 98%   BMI 42.02 kg/m      GENERAL: Pleasant, no obvious distress,  at bedside    SKIN: No jaundice    NECK: Supple without adenopathy    EYES: No scleral icterus    LUNGS: Clear to auscultation bilaterally    HEART: Regular rate and rhythm, S1 and S2 present, no lower extremity edema    ABDOMEN: Soft, non-distended, minimally tender epigastrium, no guarding/rebound/mass, bowel sounds normal, no obvious  organomegaly    MUSKULOSKELETAL:  Warm and well perfused, moves all extremities well    NEUROLOGIC: Alert and oriented    PSYCHIATRIC: Normal affect    LAB DATA:  Recent Labs   Lab Test 06/08/23  0644 06/07/23  1519 06/02/23  0549 06/01/23  1925 06/01/23  0711   WBC 8.2 13.3* 8.0  --  8.7   HGB 12.8 13.3 11.4*  --  12.4   MCV 84 84 84  --  86    331 283   < > 274   INR  --   --   --   --  1.01    < > = values in this interval not displayed.     Recent Labs   Lab Test 06/08/23 0644 06/07/23  1519 06/02/23  0549    141 140   POTASSIUM 4.2 4.5 4.1   CHLORIDE 105 103 105   CO2 23 26 22   BUN 8.2 11.4 7.2   CR 0.67 0.73 0.69   ANIONGAP 11 12 13   TINA 9.4 10.2* 9.3   GLC 82 110* 100*     Recent Labs   Lab Test 06/08/23 0644 06/07/23  1748 06/07/23  1519 06/02/23  0549 06/01/23  0711 05/31/23  1157 05/31/23  0526   ALBUMIN 4.0  --  4.3 3.6 3.7   < >  --    BILITOTAL 0.6  --  0.7 0.5 0.7   < >  --    *  --  76* 119* 164*   < >  --    *  --  99* 41* 63*   < >  --    ALKPHOS 308*  --  328* 171* 202*   < >  --    PROTEIN  --  Negative  --   --   --   --  20*   LIPASE 676*  --  108*  --  163*  --   --     < > = values in this interval not displayed.       IMAGING:  MR Abdomen MRCP w/o & w Contrast    Result Date: 6/7/2023  EXAM: MR ABDOMEN MRCP W/O and W CONTRAST LOCATION: [facility] DATE/TIME: 6/7/2023 10:18 PM CDT INDICATION: Epigastric and right upper quadrant pain, slightly increased liver function tests and alk phos, cholecystectomy about 1 week ago, eval retained stone COMPARISON: CT 06/07/2023. TECHNIQUE: Routine MR liver/pancreas protocol including axial and coronal MRCP sequences. 2D and 3D reconstruction performed by MR technologist including MIP reconstruction and slab cholangiograms. If performed with contrast, additional dynamic T1 post IV contrast images. CONTRAST: 10ml Gadavist FINDINGS: MRCP: No intrahepatic or extrahepatic biliary ductal dilatation  postcholecystectomy. Common bile duct 4 mm. No stenosis or suspicious filling defects. Trace fat edema in surgical bed gallbladder fossa. LIVER: Normal size, morphology, signal intensity, and enhancement. No suspicious or focal lesion. PANCREAS: Normal morphology, signal intensity, and enhancement. No suspicious or focal lesion. ADDITIONAL FINDINGS: Subcentimeter simple/benign appearing cyst right kidney. Aorta patent normal course and caliber. Portal vein patent.     IMPRESSION: 1.  No evidence of retained stones post cholecystectomy.    CT Abdomen Pelvis w Contrast    Result Date: 6/7/2023  EXAM: CT ABDOMEN PELVIS W CONTRAST LOCATION: [facility] DATE/TIME: 6/7/2023 4:32 PM CDT INDICATION: Epigastric pain, right upper quadrant pain, radiation to back, recent admission for gallstone pancreatitis and status post cholecystectomy COMPARISON: None. TECHNIQUE: CT scan of the abdomen and pelvis was performed following injection of IV contrast. Multiplanar reformats were obtained. Dose reduction techniques were used. CONTRAST: 90ml isovue 370 FINDINGS: LOWER CHEST: Normal. HEPATOBILIARY: Minimal low density edema and soft tissue stranding at gallbladder bed. A few linear hyperdensities likely suture material. No concerning fluid collection. Liver and bile ducts normal. PANCREAS: Normal. SPLEEN: Normal. ADRENAL GLANDS: Normal. KIDNEYS/BLADDER: Tiny right renal cyst needs no follow-up, kidneys otherwise negative. BOWEL: No obstruction or inflammatory change. Appendix normal. LYMPH NODES: Normal. VASCULATURE: Unremarkable. PELVIC ORGANS: No adnexal lesion. No free fluid. MUSCULOSKELETAL: Mild soft tissue stranding related to linear port entry sites across anterior abdominal wall. No abdominal wall hematoma.     IMPRESSION: 1.  Expected postoperative appearance after cholecystectomy with mild amount of soft tissue stranding and minimal fluid density at gallbladder bed but no concerning collection. 2.   Liver, bile ducts and pancreas otherwise appear normal.      ASSESSMENT:  Epigastric abdominal pain with elevated LFTs, suspicious for possible retained biliary sludge or stones.  Interestingly she had a negative intraoperative cholangiogram with laparoscopic cholecystectomy on June 1.  Also MRCP yesterday did not show ductal dilation, however LFTs remain elevated including alkaline phosphatase.    Principal Problem:    Epigastric pain    PLAN:  Discussed with Dr. Gustavo Mckeon and Eleuterio Kline.  35 minutes of total time was spent providing patient care, including patient evaluation, reviewing documentation/test results, and .    1.  N.p.o. for EUS and possible ERCP today.  2.  Trend labs.  3.  Further recommendations pending findings.                                                 Theresa Madrid PA-C  Thank you for the opportunity to participate in the care of this patient.   Please feel free to call me with any questions or concerns.  Phone number [phone].                    CC: [facility], [facility], Bety Painter

## 2023-06-08 NOTE — ANESTHESIA PREPROCEDURE EVALUATION
"Anesthesia Pre-Procedure Evaluation    Patient: Amanda Miller   MRN: [id number] : 2000        Procedure : Procedure(s):  ESOPHAGOGASTRODUODENOSCOPY, WITH ENDOSCOPIC US  ENDOSCOPIC RETROGRADE CHOLANGIOPANCREATOGRAPHY          Past Medical History:   Diagnosis Date     Anxiety      Depression      Depressive disorder     no medications currently, per pt \"midwife would like me to start again after pregnancy\"     Obesity      Urinary tract infection       Past Surgical History:   Procedure Laterality Date     CHOLANGIOGRAM N/A 2023    Procedure: INTRAOPERATIVE CHOLANGIOGRAMS;  Surgeon: Dimas Cloud DO;  [place] [facility] [place]     LAPAROSCOPIC CHOLECYSTECTOMY N/A 2023    Procedure: CHOLECYSTECTOMY, ROBOT-ASSISTED, LAPAROSCOPIC, USING DA LINDSAY XI,;  Surgeon: Dimas Cloud DO;  [place] [facility] [place]     TONSILLECTOMY       WISDOM TOOTH EXTRACTION        No Known Allergies   Social History     Tobacco Use     Smoking status: Never     Smokeless tobacco: Never   Vaping Use     Vaping status: Never Used   Substance Use Topics     Alcohol use: Not Currently     Comment: occas      Wt Readings from Last 1 Encounters:   23 97.6 kg (215 lb 2.7 oz)        Anesthesia Evaluation            ROS/MED HX  ENT/Pulmonary:  - neg pulmonary ROS     Neurologic:  - neg neurologic ROS     Cardiovascular:  - neg cardiovascular ROS     METS/Exercise Tolerance: >4 METS    Hematologic:  - neg hematologic  ROS     Musculoskeletal:  - neg musculoskeletal ROS     GI/Hepatic:  - neg GI/hepatic ROS     Renal/Genitourinary:  - neg Renal ROS     Endo:     (+) Obesity,     Psychiatric/Substance Use:     (+) psychiatric history depression     Infectious Disease:  - neg infectious disease ROS     Malignancy:  - neg malignancy ROS     Other:  - neg other ROS          Physical Exam    Airway  airway exam normal      Mallampati: II       Respiratory Devices and Support         Dental           Cardiovascular   cardiovascular " exam normal       Rhythm and rate: regular and normal     Pulmonary   pulmonary exam normal        breath sounds clear to auscultation           OUTSIDE LABS:  CBC:   Lab Results   Component Value Date    WBC 8.2 06/08/2023    WBC 13.3 (H) 06/07/2023    HGB 12.8 06/08/2023    HGB 13.3 06/07/2023    HCT 40.6 06/08/2023    HCT 42.3 06/07/2023     06/08/2023     06/07/2023     BMP:   Lab Results   Component Value Date     06/08/2023     06/07/2023    POTASSIUM 4.2 06/08/2023    POTASSIUM 4.5 06/07/2023    CHLORIDE 105 06/08/2023    CHLORIDE 103 06/07/2023    CO2 23 06/08/2023    CO2 26 06/07/2023    BUN 8.2 06/08/2023    BUN 11.4 06/07/2023    CR 0.67 06/08/2023    CR 0.73 06/07/2023    GLC 82 06/08/2023     (H) 06/07/2023     COAGS:   Lab Results   Component Value Date    INR 1.01 06/01/2023     POC:   Lab Results   Component Value Date    HCG Negative 05/31/2023    HCGS Negative 06/07/2023     HEPATIC:   Lab Results   Component Value Date    ALBUMIN 4.0 06/08/2023    PROTTOTAL 7.0 06/08/2023     (H) 06/08/2023     (H) 06/08/2023    ALKPHOS 308 (H) 06/08/2023    BILITOTAL 0.6 06/08/2023     OTHER:   Lab Results   Component Value Date    TINA 9.4 06/08/2023    LIPASE 676 (H) 06/08/2023       Anesthesia Plan    ASA Status:  3      Anesthesia Type: General.     - Airway: ETT   Induction: Intravenous.   Maintenance: TIVA.        Consents    Anesthesia Plan(s) and associated risks, benefits, and realistic alternatives discussed. Questions answered and patient/representative(s) expressed understanding.    - Discussed:     - Discussed with:  Patient      - Extended Intubation/Ventilatory Support Discussed: No.      - Patient is DNR/DNI Status: No    Use of blood products discussed: No .     Postoperative Care    Pain management: IV analgesics.   PONV prophylaxis: Ondansetron (or other 5HT-3), Dexamethasone or Solumedrol     Comments:                Johnny Khoury MD

## 2023-06-08 NOTE — PROGRESS NOTES
Daily Progress Note    Assessment/Plan:  22 year old female 6 weeks status post  and status postcholecystectomy  presents with abdominal pain and abnormal LFTs/lipase.  MRCP shows no evidence no ductal dilatation or stenosis or suspicious filling defects and no evidence of retained stone.  LFTs and lipase continue to rise today     1.    Pancreatitis/abnormal LFTs status post lap leonid: Surgery consulted and no surgical procedure planned.  Recommending continued n.p.o.  Lipase today increased to 676 and transaminases also increased.  Alk phos remains high.  Will consult GI for further recommendations.  Continue IV fluids and continue n.p.o. until seen by GI.     2.  6 weeks postpartum: Her pain has been controlled without narcotics and she declines narcotics.  We will have as needed Tylenol available with IV fluids.     Barriers to discharge:  Worsening LFTs, pancreatitis    Code status:Full Code        Disposition: Anticipate discharge in 1 to 2 days    Subjective:  Her abdominal pain is much better today, no fevers or chills or nausea vomiting or diarrhea.  She is starting to feel a little bit hungry.        Current Medications Reviewed via EHR List    Objective:  Vital signs in last 24 hours:  [unfilled]  .prog  Weight:   @THISENCWEIGHTS(1)@  Weight change:   Body mass index is 42.02 kg/m .    Intake/Output last 3 shifts:  No intake/output data recorded.  Intake/Output this shift:  No intake/output data recorded.    Physical Exam:  General: No apparent distress  CV:  Lungs: No wheezing  Abdomen: Nontender        Imaging:  Personally Reviewed.  MR Abdomen MRCP w/o & w Contrast    Result Date: 2023  EXAM: MR ABDOMEN MRCP W/O and W CONTRAST LOCATION: [facility] DATE/TIME: 2023 10:18 PM CDT INDICATION: Epigastric and right upper quadrant pain, slightly increased liver function tests and alk phos, cholecystectomy about 1 week ago, eval retained stone COMPARISON: CT  06/07/2023. TECHNIQUE: Routine MR liver/pancreas protocol including axial and coronal MRCP sequences. 2D and 3D reconstruction performed by MR technologist including MIP reconstruction and slab cholangiograms. If performed with contrast, additional dynamic T1 post IV contrast images. CONTRAST: 10ml Gadavist FINDINGS: MRCP: No intrahepatic or extrahepatic biliary ductal dilatation postcholecystectomy. Common bile duct 4 mm. No stenosis or suspicious filling defects. Trace fat edema in surgical bed gallbladder fossa. LIVER: Normal size, morphology, signal intensity, and enhancement. No suspicious or focal lesion. PANCREAS: Normal morphology, signal intensity, and enhancement. No suspicious or focal lesion. ADDITIONAL FINDINGS: Subcentimeter simple/benign appearing cyst right kidney. Aorta patent normal course and caliber. Portal vein patent.     IMPRESSION: 1.  No evidence of retained stones post cholecystectomy.    CT Abdomen Pelvis w Contrast    Result Date: 6/7/2023  EXAM: CT ABDOMEN PELVIS W CONTRAST LOCATION: [facility] DATE/TIME: 6/7/2023 4:32 PM CDT INDICATION: Epigastric pain, right upper quadrant pain, radiation to back, recent admission for gallstone pancreatitis and status post cholecystectomy COMPARISON: None. TECHNIQUE: CT scan of the abdomen and pelvis was performed following injection of IV contrast. Multiplanar reformats were obtained. Dose reduction techniques were used. CONTRAST: 90ml isovue 370 FINDINGS: LOWER CHEST: Normal. HEPATOBILIARY: Minimal low density edema and soft tissue stranding at gallbladder bed. A few linear hyperdensities likely suture material. No concerning fluid collection. Liver and bile ducts normal. PANCREAS: Normal. SPLEEN: Normal. ADRENAL GLANDS: Normal. KIDNEYS/BLADDER: Tiny right renal cyst needs no follow-up, kidneys otherwise negative. BOWEL: No obstruction or inflammatory change. Appendix normal. LYMPH NODES: Normal. VASCULATURE: Unremarkable.  PELVIC ORGANS: No adnexal lesion. No free fluid. MUSCULOSKELETAL: Mild soft tissue stranding related to linear port entry sites across anterior abdominal wall. No abdominal wall hematoma.     IMPRESSION: 1.  Expected postoperative appearance after cholecystectomy with mild amount of soft tissue stranding and minimal fluid density at gallbladder bed but no concerning collection. 2.  Liver, bile ducts and pancreas otherwise appear normal.    XR Surgery SUDHA L/T 5 Min Fluoro    Result Date: 6/1/2023  This exam was marked as non-reportable because it will not be read by a radiologist or a [place] non-radiologist provider.     Abdomen US, limited (RUQ only)    Result Date: 5/31/2023  EXAM: US ABDOMEN LIMITED LOCATION: [facility] DATE/TIME: 5/31/2023 2:25 AM CDT INDICATION: Epigastric pain, episodic. COMPARISON: None. TECHNIQUE: Limited abdominal ultrasound. FINDINGS: GALLBLADDER: Abnormal appearance of the gallbladder. There are multiple gallstones in the gallbladder. There is prominent diffuse gallbladder wall thickening measuring up to 9 mm. Despite a negative sonographic Merino's sign, findings could be seen with acute cholecystitis in the appropriate setting and clinical correlation is needed. BILE DUCTS: No biliary dilatation. The common duct measures 4 mm. LIVER: Normal parenchyma with smooth contour. No focal mass. RIGHT KIDNEY: No hydronephrosis. PANCREAS: The visualized portions are normal. No ascites.     IMPRESSION: 1.  Cholelithiasis with marked diffuse gallbladder wall thickening. Despite a negative sonographic Merino's sign, findings could be seen with cholecystitis in the appropriate clinical setting. Clinical correlation. 2.  No biliary dilatation. 3.  Remainder of the right upper quadrant ultrasound is unremarkable.       Lab Results:  Personally Reviewed.   Fingerstick Blood Glucose: @[id number](POCGLUFGR:10)@    Last Hbg A1C: No results found for: HGBA1C   Lab Results    Component Value Date    INR 1.01 06/01/2023     Recent Results (from the past 24 hour(s))   Basic metabolic panel    Collection Time: 06/07/23  3:19 PM   Result Value Ref Range    Sodium 141 136 - 145 mmol/L    Potassium 4.5 3.4 - 5.3 mmol/L    Chloride 103 98 - 107 mmol/L    Carbon Dioxide (CO2) 26 22 - 29 mmol/L    Anion Gap 12 7 - 15 mmol/L    Urea Nitrogen 11.4 6.0 - 20.0 mg/dL    Creatinine 0.73 0.51 - 0.95 mg/dL    Calcium 10.2 (H) 8.6 - 10.0 mg/dL    Glucose 110 (H) 70 - 99 mg/dL    GFR Estimate >90 >60 mL/min/1.73m2   Lipase    Collection Time: 06/07/23  3:19 PM   Result Value Ref Range    Lipase 108 (H) 13 - 60 U/L   HCG QUALitative pregnancy (blood)    Collection Time: 06/07/23  3:19 PM   Result Value Ref Range    hCG Serum Qualitative Negative Negative   CBC with platelets and differential    Collection Time: 06/07/23  3:19 PM   Result Value Ref Range    WBC Count 13.3 (H) 4.0 - 11.0 10e3/uL    RBC Count 5.02 3.80 - 5.20 10e6/uL    Hemoglobin 13.3 11.7 - 15.7 g/dL    Hematocrit 42.3 35.0 - 47.0 %    MCV 84 78 - 100 fL    MCH 26.5 26.5 - 33.0 pg    MCHC 31.4 (L) 31.5 - 36.5 g/dL    RDW 13.3 10.0 - 15.0 %    Platelet Count 331 150 - 450 10e3/uL    % Neutrophils 82 %    % Lymphocytes 11 %    % Monocytes 5 %    % Eosinophils 1 %    % Basophils 0 %    % Immature Granulocytes 1 %    NRBCs per 100 WBC 0 <1 /100    Absolute Neutrophils 11.0 (H) 1.6 - 8.3 10e3/uL    Absolute Lymphocytes 1.4 0.8 - 5.3 10e3/uL    Absolute Monocytes 0.7 0.0 - 1.3 10e3/uL    Absolute Eosinophils 0.1 0.0 - 0.7 10e3/uL    Absolute Basophils 0.0 0.0 - 0.2 10e3/uL    Absolute Immature Granulocytes 0.1 <=0.4 10e3/uL    Absolute NRBCs 0.0 10e3/uL   Hepatic function panel    Collection Time: 06/07/23  3:19 PM   Result Value Ref Range    Protein Total 7.7 6.4 - 8.3 g/dL    Albumin 4.3 3.5 - 5.2 g/dL    Bilirubin Total 0.7 <=1.2 mg/dL    Alkaline Phosphatase 328 (H) 35 - 104 U/L    AST 99 (H) 10 - 35 U/L    ALT 76 (H) 10 - 35 U/L     Bilirubin Direct 0.35 (H) 0.00 - 0.30 mg/dL   UA with Microscopic reflex to Culture    Collection Time: 06/07/23  5:48 PM    Specimen: Urine, Clean Catch   Result Value Ref Range    Color Urine Light Yellow Colorless, Straw, Light Yellow, Yellow    Appearance Urine Clear Clear    Glucose Urine Negative Negative mg/dL    Bilirubin Urine Negative Negative    Ketones Urine Negative Negative mg/dL    Specific Gravity Urine 1.010 1.003 - 1.035    Blood Urine Negative Negative    pH Urine 7.5 (H) 5.0 - 7.0    Protein Albumin Urine Negative Negative mg/dL    Urobilinogen Urine <2.0 <2.0 mg/dL    Nitrite Urine Negative Negative    Leukocyte Esterase Urine Negative Negative    RBC Urine 1 <=2 /HPF    WBC Urine 3 <=5 /HPF    Squamous Epithelials Urine 2 (H) <=1 /HPF   Comprehensive metabolic panel    Collection Time: 06/08/23  6:44 AM   Result Value Ref Range    Sodium 139 136 - 145 mmol/L    Potassium 4.2 3.4 - 5.3 mmol/L    Chloride 105 98 - 107 mmol/L    Carbon Dioxide (CO2) 23 22 - 29 mmol/L    Anion Gap 11 7 - 15 mmol/L    Urea Nitrogen 8.2 6.0 - 20.0 mg/dL    Creatinine 0.67 0.51 - 0.95 mg/dL    Calcium 9.4 8.6 - 10.0 mg/dL    Glucose 82 70 - 99 mg/dL    Alkaline Phosphatase 308 (H) 35 - 104 U/L     (H) 10 - 35 U/L     (H) 10 - 35 U/L    Protein Total 7.0 6.4 - 8.3 g/dL    Albumin 4.0 3.5 - 5.2 g/dL    Bilirubin Total 0.6 <=1.2 mg/dL    GFR Estimate >90 >60 mL/min/1.73m2   CBC with platelets    Collection Time: 06/08/23  6:44 AM   Result Value Ref Range    WBC Count 8.2 4.0 - 11.0 10e3/uL    RBC Count 4.83 3.80 - 5.20 10e6/uL    Hemoglobin 12.8 11.7 - 15.7 g/dL    Hematocrit 40.6 35.0 - 47.0 %    MCV 84 78 - 100 fL    MCH 26.5 26.5 - 33.0 pg    MCHC 31.5 31.5 - 36.5 g/dL    RDW 13.6 10.0 - 15.0 %    Platelet Count 290 150 - 450 10e3/uL   Lipase    Collection Time: 06/08/23  6:44 AM   Result Value Ref Range    Lipase 676 (H) 13 - 60 U/L           Advanced Care Planning    Medical Decision Making       50  MINUTES SPENT BY ME on the date of service doing chart review, history, exam, documentation & further activities per the note.      Jordan Miguel MD  Date: 6/8/2023  Time: 9:57 AM  [facility]  Family Medicine

## 2023-06-08 NOTE — UTILIZATION REVIEW
Admission Status; Secondary Review Determination   Under the authority of the Utilization Management Committee, the utilization review process indicated a secondary review on Amanda Miller. The review outcome is based on review of the medical records, discussions with staff, and applying clinical experience noted on the date of the review.   (x) Inpatient Status Appropriate - This patient's medical care is consistent with medical management for inpatient care and reasonable inpatient medical practice.     RATIONALE FOR DETERMINATION   22 year old female with a past medical history of cholecystitis, cholelithiasis and laparoscopic cholecystectomy, who presents to the ED for evaluation of abdominal pain. Admitted with  Gallstone pancreatitis with likely retained ductal stone:  GI and Surgery consulted, plan for EUS/ ERCP , still NPO, on IV fluid and IV pain medications     At the time of admission with the information available to the attending physician more than 2 nights Hospital complex care was anticipated, based on patient risk of adverse outcome if treated as outpatient and complex care required. Inpatient admission is appropriate based on the Medicare guidelines.   The information on this document is developed by the utilization review team in order for the business office to ensure compliance. This only denotes the appropriateness of proper admission status and does not reflect the quality of care rendered.   The definitions of Inpatient Status and Observation Status used in making the determination above are those provided in the CMS Coverage Manual, Chapter 1 and Chapter 6, section 70.4.   Sincerely,   Og Bustillos MD  Utilization Review  Physician Advisor  [facility]

## 2023-06-08 NOTE — PLAN OF CARE
Problem: Pain Acute  Goal: Optimal Pain Control and Function  Outcome: Progressing     Problem: Plan of Care - These are the overarching goals to be used throughout the patient stay.    Goal: Plan of Care Review  Description: The Plan of Care Review/Shift note should be completed every shift.  The Outcome Evaluation is a brief statement about your assessment that the patient is improving, declining, or no change.  This information will be displayed automatically on your shift note.  Outcome: Progressing  Goal: Optimal Comfort and Wellbeing  Outcome: Progressing   Goal Outcome Evaluation:    VSS. A/Ox4. No pain or nausea reported.  at bedside. Slept between cares.

## 2023-06-08 NOTE — ANESTHESIA POSTPROCEDURE EVALUATION
Patient: Amanda Miller    Procedure: Procedure(s):  ENDOSCOPIC ULTRASOUND       Anesthesia Type:  General    Note:  Disposition: Inpatient   Postop Pain Control: Uneventful            Sign Out: Well controlled pain   PONV: No   Neuro/Psych: Uneventful            Sign Out: Acceptable/Baseline neuro status   Airway/Respiratory: Uneventful            Sign Out: Acceptable/Baseline resp. status   CV/Hemodynamics: Uneventful            Sign Out: Acceptable CV status; No obvious hypovolemia; No obvious fluid overload   Other NRE:    DID A NON-ROUTINE EVENT OCCUR?            Last vitals:  Vitals Value Taken Time   /92 06/08/23 1530   Temp 37.1  C (98.7  F) 06/08/23 1459   Pulse 75 06/08/23 1540   Resp 18 06/08/23 1539   SpO2 93 % 06/08/23 1540   Vitals shown include unvalidated device data.    Electronically Signed By: Johnny Khoury MD  June 8, 2023  6:03 PM

## 2023-06-08 NOTE — H&P
ADMISSION HISTORY & PHYSICAL      Clinic, [facility], [phone]  ASSESSMENT AND PLAN:  22 year old female 6 weeks status post  and status postcholecystectomy  presents with abdominal pain presenting with:    1.  Gallstone pancreatitis with likely retained ductal stone: Surgery consulted and MRCP ordered.  May need GI consult.  IV fluids, n.p.o.  Lipase mildly elevated.    2.  6 weeks postpartum: Her pain has been controlled without narcotics and she declines narcotics.  We will have as needed Tylenol available with IV fluids.    Barriers to discharge: Evaluation for retained bile duct stone      CHIEF COMPLAINT:  Abdominal pain and nausea    HISTORY OF PRESENTING ILLNESS:  Amanda Miller is a 22 year old female recently discharged following cholecystectomy for gallstone pancreatitis.  She felt well until this afternoon when she developed sudden onset epigastric pain that started after eating.  Pain radiated to her back between her shoulder blades and feels similar to her prior gallbladder pain.  No fevers or chills, no vomiting or diarrhea.  No chest pain or shortness of breath or respiratory symptoms.    PMH/PSH:  Patient Active Problem List   Diagnosis     Depression     Anxiety     Encounter for supervision of normal first pregnancy in third trimester     At risk for venous thromboembolism (VTE)     At risk for complication of pregnancy     BMI 40.0-44.9, adult (H)     Positive depression screening     Genetic screening     Echogenic intracardiac focus of fetus on prenatal ultrasound     GBS (group B Streptococcus carrier), +RV culture, currently pregnant     Failed medical induction of labor     Thin meconium stained amniotic fluid      (normal spontaneous vaginal delivery)     Acute biliary pancreatitis without infection or necrosis     Cholelithiasis     Cholecystitis     Epigastric pain       ALLERGIES:  No Known Allergies    MEDICATIONS:  Reviewed.  Current Facility-Administered  Medications   Medication     acetaminophen (TYLENOL) tablet 650 mg    Or     acetaminophen (TYLENOL) Suppository 650 mg     lidocaine (LMX4) cream     lidocaine 1 % 0.1-1 mL     melatonin tablet 1 mg     ondansetron (ZOFRAN ODT) ODT tab 4 mg    Or     ondansetron (ZOFRAN) injection 4 mg     sodium chloride (PF) 0.9% PF flush 3 mL     sodium chloride (PF) 0.9% PF flush 3 mL     sodium chloride 0.9% infusion     Current Outpatient Medications   Medication     acetaminophen (TYLENOL) 500 MG tablet     escitalopram (LEXAPRO) 10 MG tablet     ibuprofen (ADVIL/MOTRIN) 600 MG tablet     magnesium oxide (MAG-OX) 400 MG tablet     Prenatal Vit-Fe Fumarate-FA (PRENATAL VITAMIN PO)     vitamin B1 (THIAMINE) 250 MG tablet       SOCIAL HISTORY:  Social History     Socioeconomic History     Marital status:      Spouse name: Roslyn     Number of children: 1     Years of education: Not on file     Highest education level: Some college, no degree   Occupational History     Not on file   Tobacco Use     Smoking status: Never     Smokeless tobacco: Never   Vaping Use     Vaping status: Never Used   Substance and Sexual Activity     Alcohol use: Not Currently     Comment: occas     Drug use: Not Currently     Sexual activity: Yes     Partners: Male     Birth control/protection: None   Other Topics Concern     Not on file   Social History Narrative     Not on file     Social Determinants of Health     Financial Resource Strain: Not on file   Food Insecurity: Not on file   Transportation Needs: Not on file   Physical Activity: Not on file   Stress: Not on file   Social Connections: Not on file   Intimate Partner Violence: Not on file   Housing Stability: Not on file       FAMILY HISTORY:  Family History   Problem Relation Age of Onset     Depression Mother      Depression Father      Cancer Paternal Grandmother        ROS:  12 point ROS was performed and found to be negative except for the pertinent positives mentioned in the  HPI.      PHYSICAL EXAM:  /60   Pulse 57   Temp 98  F (36.7  C) (Oral)   Resp 16   Wt 99.8 kg (220 lb)   LMP 07/06/2022   SpO2 99%   BMI 42.97 kg/m    No intake/output data recorded.  No intake/output data recorded.  CONSTITUTIONAL: No apparent distress  HEENT:       Sclera- anicteric      Mucous membrane- moist and pink  LUNGS: Clear to auscultation bilaterally  CARDIOVASCULAR: S1S2 regular. No murmurs, rubs or gallops,no pedal edema  GI: Soft.  Mild right upper quadrant tenderness, non-distended. No organomegaly. No guarding or rigidity. Bowel sounds- active  NEURO: Cranial nerves II-XII grossly intact. No focal neurological deficit. No involuntary movements  INTGM: No jaundice no cyanosis or clubbing  LYMPH:   MSK: no joint swelling or tenderness  PSYCH: alert and oriented x 3, no agitation      DIAGNOSTIC DATA:  Recent Results (from the past 24 hour(s))   Basic metabolic panel    Collection Time: 06/07/23  3:19 PM   Result Value Ref Range    Sodium 141 136 - 145 mmol/L    Potassium 4.5 3.4 - 5.3 mmol/L    Chloride 103 98 - 107 mmol/L    Carbon Dioxide (CO2) 26 22 - 29 mmol/L    Anion Gap 12 7 - 15 mmol/L    Urea Nitrogen 11.4 6.0 - 20.0 mg/dL    Creatinine 0.73 0.51 - 0.95 mg/dL    Calcium 10.2 (H) 8.6 - 10.0 mg/dL    Glucose 110 (H) 70 - 99 mg/dL    GFR Estimate >90 >60 mL/min/1.73m2   Lipase    Collection Time: 06/07/23  3:19 PM   Result Value Ref Range    Lipase 108 (H) 13 - 60 U/L   HCG QUALitative pregnancy (blood)    Collection Time: 06/07/23  3:19 PM   Result Value Ref Range    hCG Serum Qualitative Negative Negative   CBC with platelets and differential    Collection Time: 06/07/23  3:19 PM   Result Value Ref Range    WBC Count 13.3 (H) 4.0 - 11.0 10e3/uL    RBC Count 5.02 3.80 - 5.20 10e6/uL    Hemoglobin 13.3 11.7 - 15.7 g/dL    Hematocrit 42.3 35.0 - 47.0 %    MCV 84 78 - 100 fL    MCH 26.5 26.5 - 33.0 pg    MCHC 31.4 (L) 31.5 - 36.5 g/dL    RDW 13.3 10.0 - 15.0 %    Platelet Count 331  150 - 450 10e3/uL    % Neutrophils 82 %    % Lymphocytes 11 %    % Monocytes 5 %    % Eosinophils 1 %    % Basophils 0 %    % Immature Granulocytes 1 %    NRBCs per 100 WBC 0 <1 /100    Absolute Neutrophils 11.0 (H) 1.6 - 8.3 10e3/uL    Absolute Lymphocytes 1.4 0.8 - 5.3 10e3/uL    Absolute Monocytes 0.7 0.0 - 1.3 10e3/uL    Absolute Eosinophils 0.1 0.0 - 0.7 10e3/uL    Absolute Basophils 0.0 0.0 - 0.2 10e3/uL    Absolute Immature Granulocytes 0.1 <=0.4 10e3/uL    Absolute NRBCs 0.0 10e3/uL   Hepatic function panel    Collection Time: 06/07/23  3:19 PM   Result Value Ref Range    Protein Total 7.7 6.4 - 8.3 g/dL    Albumin 4.3 3.5 - 5.2 g/dL    Bilirubin Total 0.7 <=1.2 mg/dL    Alkaline Phosphatase 328 (H) 35 - 104 U/L    AST 99 (H) 10 - 35 U/L    ALT 76 (H) 10 - 35 U/L    Bilirubin Direct 0.35 (H) 0.00 - 0.30 mg/dL   UA with Microscopic reflex to Culture    Collection Time: 06/07/23  5:48 PM    Specimen: Urine, Clean Catch   Result Value Ref Range    Color Urine Light Yellow Colorless, Straw, Light Yellow, Yellow    Appearance Urine Clear Clear    Glucose Urine Negative Negative mg/dL    Bilirubin Urine Negative Negative    Ketones Urine Negative Negative mg/dL    Specific Gravity Urine 1.010 1.003 - 1.035    Blood Urine Negative Negative    pH Urine 7.5 (H) 5.0 - 7.0    Protein Albumin Urine Negative Negative mg/dL    Urobilinogen Urine <2.0 <2.0 mg/dL    Nitrite Urine Negative Negative    Leukocyte Esterase Urine Negative Negative    RBC Urine 1 <=2 /HPF    WBC Urine 3 <=5 /HPF    Squamous Epithelials Urine 2 (H) <=1 /HPF     All lab studies reviewed personally    CT Abdomen Pelvis w Contrast    Result Date: 6/7/2023  EXAM: CT ABDOMEN PELVIS W CONTRAST LOCATION: [facility] DATE/TIME: 6/7/2023 4:32 PM CDT INDICATION: Epigastric pain, right upper quadrant pain, radiation to back, recent admission for gallstone pancreatitis and status post cholecystectomy COMPARISON: None. TECHNIQUE: CT  scan of the abdomen and pelvis was performed following injection of IV contrast. Multiplanar reformats were obtained. Dose reduction techniques were used. CONTRAST: 90ml isovue 370 FINDINGS: LOWER CHEST: Normal. HEPATOBILIARY: Minimal low density edema and soft tissue stranding at gallbladder bed. A few linear hyperdensities likely suture material. No concerning fluid collection. Liver and bile ducts normal. PANCREAS: Normal. SPLEEN: Normal. ADRENAL GLANDS: Normal. KIDNEYS/BLADDER: Tiny right renal cyst needs no follow-up, kidneys otherwise negative. BOWEL: No obstruction or inflammatory change. Appendix normal. LYMPH NODES: Normal. VASCULATURE: Unremarkable. PELVIC ORGANS: No adnexal lesion. No free fluid. MUSCULOSKELETAL: Mild soft tissue stranding related to linear port entry sites across anterior abdominal wall. No abdominal wall hematoma.     IMPRESSION: 1.  Expected postoperative appearance after cholecystectomy with mild amount of soft tissue stranding and minimal fluid density at gallbladder bed but no concerning collection. 2.  Liver, bile ducts and pancreas otherwise appear normal.    XR Surgery SUDHA L/T 5 Min Fluoro    Result Date: 6/1/2023  This exam was marked as non-reportable because it will not be read by a radiologist or a [facility] non-radiologist provider.     Abdomen US, limited (RUQ only)    Result Date: 5/31/2023  EXAM: US ABDOMEN LIMITED LOCATION: [facility] DATE/TIME: 5/31/2023 2:25 AM CDT INDICATION: Epigastric pain, episodic. COMPARISON: None. TECHNIQUE: Limited abdominal ultrasound. FINDINGS: GALLBLADDER: Abnormal appearance of the gallbladder. There are multiple gallstones in the gallbladder. There is prominent diffuse gallbladder wall thickening measuring up to 9 mm. Despite a negative sonographic Merino's sign, findings could be seen with acute cholecystitis in the appropriate setting and clinical correlation is needed. BILE DUCTS: No biliary dilatation. The  common duct measures 4 mm. LIVER: Normal parenchyma with smooth contour. No focal mass. RIGHT KIDNEY: No hydronephrosis. PANCREAS: The visualized portions are normal. No ascites.     IMPRESSION: 1.  Cholelithiasis with marked diffuse gallbladder wall thickening. Despite a negative sonographic Merino's sign, findings could be seen with cholecystitis in the appropriate clinical setting. Clinical correlation. 2.  No biliary dilatation. 3.  Remainder of the right upper quadrant ultrasound is unremarkable.     Radiology report reviewed.    Medical Decision Making       75 MINUTES SPENT BY ME on the date of service doing chart review, history, exam, documentation & further activities per the note.      Jordan Miguel MD  [facility] Medicine [facility]

## 2023-06-08 NOTE — PROGRESS NOTES
Care Management Follow Up    Length of Stay (days): 1    Expected Discharge Date: 06/09/2023     Concerns to be Addressed:  EUS and possible ERCP  Patient plan of care discussed at interdisciplinary rounds: Yes    Anticipated Discharge Disposition:  home     Anticipated Discharge Services:   home  Anticipated Discharge DME:  NA    Patient/family educated on Medicare website which has current facility and service quality ratings:  NA  Education Provided on the Discharge Plan:  Per team  Patient/Family in Agreement with the Plan:  yes    Referrals Placed by CM/SW:  NA  Private pay costs discussed: NA    Additional Information:  Pt is a 22 year old female who was admitted for epigastric pain s/p cholecystectomy. She had an ERCP for a possible retained stone in the bile duct.    Pt live with her spouse and children and is independent at baseline.  Plan is to return home on discharge.  Family can provide transportation.     Pt continues to have elevated LFTS,.  She is being kept for further treatment at this time and may discharge later today or tomorrow.    CM will continue to follow.    Magaly Martino RN

## 2023-06-08 NOTE — PLAN OF CARE
Patient back from procedure at 16:00.    Patient tolerating regular diet. Patient denies pain and nausea.    Patient is breast feeding. But, now pumping and dumping due to meds given in procedure.

## 2023-06-08 NOTE — ANESTHESIA PROCEDURE NOTES
Airway       Patient location during procedure: OR       Procedure Start/Stop Times: 6/8/2023 2:12 PM  Staff -        Anesthesiologist:  Johnny Khoury MD       CRNA: Brandi Waite APRN CRNA       Other Anesthesia Staff: Houston Chávez       Performed By: YONATAN  Consent for Airway        Urgency: elective  Indications and Patient Condition       Indications for airway management: ivonne-procedural       Induction type:intravenous       Mask difficulty assessment: 1 - vent by mask    Final Airway Details       Final airway type: endotracheal airway       Successful airway: ETT - single and Oral  Endotracheal Airway Details        ETT size (mm): 7.0       Cuffed: yes       Successful intubation technique: direct laryngoscopy       DL Blade Type: Carlton 2       Grade View of Cords: 1       Adjucts: stylet       Position: Right       Measured from: gums/teeth       Secured at (cm): 21       Bite Block used: EGD bite block.    Post intubation assessment        Placement verified by: capnometry, equal breath sounds and chest rise        Number of attempts at approach: 1       Number of other approaches attempted: 0       Secured with: silk tape       Ease of procedure: easy       Dentition: Intact and Unchanged       Dental guard used and removed. Dental Guard Type: Proguard Red.    Medication(s) Administered   Medication Administration Time: 6/8/2023 2:12 PM    Additional Comments       Vocal cords open and clear

## 2023-06-08 NOTE — ANESTHESIA CARE TRANSFER NOTE
Patient: Amanda Miller    Procedure: Procedure(s):  ENDOSCOPIC ULTRASOUND       Diagnosis: Epigastric pain [R10.13]  Diagnosis Additional Information: No value filed.    Anesthesia Type:   General     Note:    Oropharynx: oropharynx clear of all foreign objects  Level of Consciousness: drowsy  Oxygen Supplementation: room air    Independent Airway: airway patency satisfactory and stable  Dentition: dentition unchanged  Vital Signs Stable: post-procedure vital signs reviewed and stable  Report to RN Given: handoff report given  Patient transferred to: PACU    Handoff Report: Identifed the Patient, Identified the Reponsible Provider, Reviewed the pertinent medical history, Discussed the surgical course, Reviewed Intra-OP anesthesia mangement and issues during anesthesia, Set expectations for post-procedure period and Allowed opportunity for questions and acknowledgement of understanding      Vitals:  Vitals Value Taken Time   /70    Temp 98.5    Pulse 83    Resp 13    SpO2 96        Electronically Signed By: BLAYNE Lozada CRNA  June 8, 2023  2:59 PM

## 2023-06-08 NOTE — CONSULTS
General Surgery Consultation  Amanda Miller MRN# [id number]   Age/Sex: 22 year old female YOB: 2000     Reason for consult: 1. Epigastric pain            Requesting physician:  Velasquez Miguel MD                   Assessment and Plan:   Assessment:  1.  Elevated LFTs  2.  Increased lipase   3.  Morbid obesity  4.  History of chronic cholecystitis -status post cholecystectomy    22-year-old female presenting with abdominal pain and elevated LFTs.  The cause is unclear given that the patient has no identifiable retained stones on the CT scan or on the MRCP.  It is possible that the patient could have leftover sludge that was passing through.  Patient is improving with her symptoms today.    Plan:  -There is no acute surgical intervention from my standpoint.  If the patient continues to have elevated LFTs with a clear etiology, my recommendation would be to proceed with a GI consultation for possible EUS to determine other possibilities.    -From the general surgery standpoint, the patient can have diet as tolerated.  However, I would keep the patient n.p.o. for now until cleared from all other consultants.    -Continue medical management per primary team.  -Feel free to contact general surgery team should there be any further questions or concerns.          Chief Complaint:     Chief Complaint   Patient presents with     Abdominal Pain        History is obtained from the patient    HPI:   Amanda Miller is a 22 year old female who presents to the hospital complains of abdominal pain.  Patient was worked up and she was found to have an elevated lipase with pancreatitis.  There was concerns for possible retained stone in the common bile duct given that the patient had recent cholecystectomy for gallstone pancreatitis.  An MRCP was completed and there were no findings for retained stone in the common bile duct.      General surgery team saw the patient this morning.  Patient states that she has no  "nausea no vomiting.  Her symptoms are improving.  She denies any recent alcohol use.  She denies any recent occasions that could be causing her symptoms.  She has no further complaints at this time.          Past Medical History:     Past Medical History:   Diagnosis Date     Anxiety      Depression      Depressive disorder     no medications currently, per pt \"midwife would like me to start again after pregnancy\"     Obesity      Urinary tract infection               Past Surgical History:     Past Surgical History:   Procedure Laterality Date     CHOLANGIOGRAM N/A 6/1/2023    Procedure: INTRAOPERATIVE CHOLANGIOGRAMS;  Surgeon: Dimas Cloud DO;  Location: [facility] OR     LAPAROSCOPIC CHOLECYSTECTOMY N/A 6/1/2023    Procedure: CHOLECYSTECTOMY, ROBOT-ASSISTED, LAPAROSCOPIC, USING DA LINDSAY XI,;  Surgeon: Dimas Cloud DO;  Location: [facility] OR     TONSILLECTOMY       WISDOM TOOTH EXTRACTION               Social History:    reports that she has never smoked. She has never used smokeless tobacco. She reports that she does not currently use alcohol. She reports that she does not currently use drugs.           Family History:     Family History   Problem Relation Age of Onset     Depression Mother      Depression Father      Cancer Paternal Grandmother               Allergies:   No Known Allergies           Medications:     Prior to Admission medications    Medication Sig Start Date End Date Taking? Authorizing Provider   acetaminophen (TYLENOL) 500 MG tablet Take 500-1,000 mg by mouth every 6 hours as needed for mild pain   Yes Unknown, Entered By History   escitalopram (LEXAPRO) 10 MG tablet Take 1 tablet (10 mg) by mouth daily 5/2/23  Yes Theresa Osuna APRN CNM   ibuprofen (ADVIL/MOTRIN) 600 MG tablet Take 1 tablet (600 mg) by mouth every 6 hours as needed for other (cramping) 5/1/23  Yes Theresa Osuna APRN CNM   magnesium oxide (MAG-OX) 400 MG tablet Take 400 mg by mouth daily   Yes Unknown, " Entered By History   Prenatal Vit-Fe Fumarate-FA (PRENATAL VITAMIN PO) Take 1 tablet by mouth daily   Yes Reported, Patient   vitamin B1 (THIAMINE) 250 MG tablet Take 250 mg by mouth daily   Yes Unknown, Entered By History              Review of Systems:   The Review of Systems is negative other than noted in the HPI            Physical Exam:     Patient Vitals for the past 24 hrs:   BP Temp Temp src Pulse Resp SpO2 Weight   06/08/23 0720 100/54 98.1  F (36.7  C) Oral 50 16 97 % --   06/07/23 2352 117/71 97.6  F (36.4  C) Oral 53 16 99 % --   06/07/23 2009 116/67 98.6  F (37  C) Oral 63 18 99 % 97.6 kg (215 lb 2.7 oz)   06/07/23 1830 112/60 -- -- 57 -- 99 % --   06/07/23 1815 106/60 -- -- 62 -- 98 % --   06/07/23 1800 115/61 -- -- 60 -- 98 % --   06/07/23 1421 100/52 98  F (36.7  C) Oral 73 16 94 % 99.8 kg (220 lb)        No intake or output data in the 24 hours ending 06/08/23 0918   Constitutional:   awake, alert, cooperative, no apparent distress, and appears stated age       Eyes:   PERRL, conjunctiva/corneas clear, EOM's intact; no scleral edema or icterus noted        ENT:   Normocephalic, without obvious abnormality, atraumatic, Lips, mucosa, and tongue normal        Hematologic / Lymphatic:   No lymphadenopathy       Lungs:   Normal respiratory effort, no accessory muscle use       Cardiovascular:   Regular rate and rhythm       Abdomen:   abd soft, ND, NT.  Incisions well healed.        Musculoskeletal:   No obvious swelling, bruising or deformity       Skin:   Skin color and texture normal for patient, no rashes or lesions              Data:        All imaging studies reviewed by me.  I personally reviewed the imagings and agree with no acute findings MRCP and on the CT scan.    Dimas Cloud DO  General Surgeon  [facility]  Surgery [facility] - [address]  [address]  [place] [address]?  Office: [phone]  Employed by - [facility]  Pager:  [phone]

## 2023-06-09 VITALS
RESPIRATION RATE: 16 BRPM | DIASTOLIC BLOOD PRESSURE: 56 MMHG | BODY MASS INDEX: 42.02 KG/M2 | TEMPERATURE: 98.3 F | WEIGHT: 215.17 LBS | OXYGEN SATURATION: 95 % | SYSTOLIC BLOOD PRESSURE: 104 MMHG | HEART RATE: 70 BPM

## 2023-06-09 LAB
ALBUMIN SERPL BCG-MCNC: 4.1 G/DL (ref 3.5–5.2)
ALP SERPL-CCNC: 264 U/L (ref 35–104)
ALT SERPL W P-5'-P-CCNC: 132 U/L (ref 10–35)
ANION GAP SERPL CALCULATED.3IONS-SCNC: 12 MMOL/L (ref 7–15)
AST SERPL W P-5'-P-CCNC: 46 U/L (ref 10–35)
BILIRUB DIRECT SERPL-MCNC: <0.2 MG/DL (ref 0–0.3)
BILIRUB SERPL-MCNC: 0.3 MG/DL
BUN SERPL-MCNC: 15.6 MG/DL (ref 6–20)
CALCIUM SERPL-MCNC: 9.6 MG/DL (ref 8.6–10)
CHLORIDE SERPL-SCNC: 102 MMOL/L (ref 98–107)
CREAT SERPL-MCNC: 0.73 MG/DL (ref 0.51–0.95)
DEPRECATED HCO3 PLAS-SCNC: 23 MMOL/L (ref 22–29)
GFR SERPL CREATININE-BSD FRML MDRD: >90 ML/MIN/1.73M2
GLUCOSE SERPL-MCNC: 119 MG/DL (ref 70–99)
HOLD SPECIMEN: NORMAL
LIPASE SERPL-CCNC: 109 U/L (ref 13–60)
POTASSIUM SERPL-SCNC: 4.2 MMOL/L (ref 3.4–5.3)
PROT SERPL-MCNC: 7.6 G/DL (ref 6.4–8.3)
SODIUM SERPL-SCNC: 137 MMOL/L (ref 136–145)

## 2023-06-09 PROCEDURE — 80053 COMPREHEN METABOLIC PANEL: CPT | Performed by: INTERNAL MEDICINE

## 2023-06-09 PROCEDURE — 82248 BILIRUBIN DIRECT: CPT | Performed by: PHYSICIAN ASSISTANT

## 2023-06-09 PROCEDURE — 99239 HOSP IP/OBS DSCHRG MGMT >30: CPT | Performed by: EMERGENCY MEDICINE

## 2023-06-09 PROCEDURE — G0378 HOSPITAL OBSERVATION PER HR: HCPCS

## 2023-06-09 PROCEDURE — 83690 ASSAY OF LIPASE: CPT | Performed by: EMERGENCY MEDICINE

## 2023-06-09 PROCEDURE — 36415 COLL VENOUS BLD VENIPUNCTURE: CPT | Performed by: PHYSICIAN ASSISTANT

## 2023-06-09 ASSESSMENT — ACTIVITIES OF DAILY LIVING (ADL)
ADLS_ACUITY_SCORE: 20

## 2023-06-09 NOTE — PLAN OF CARE
Pt will be discharging home accompanied by her .   Discharge instructions were reviewed with the pt prior to leaving.

## 2023-06-09 NOTE — PROGRESS NOTES
Care Management Discharge Note    Discharge Date: 06/09/2023       Discharge Disposition: Home    Discharge Services: None    Discharge DME: None    Discharge Transportation:      Private pay costs discussed: Not applicable    Does the patient's insurance plan have a 3 day qualifying hospital stay waiver?  No    PAS Confirmation Code:    Patient/family educated on Medicare website which has current facility and service quality ratings: no    Education Provided on the Discharge Plan: Yes  Persons Notified of Discharge Plans: pt, family  Patient/Family in Agreement with the Plan: yes    Handoff Referral Completed: Yes    Additional Information:  Pt to be discharged to home.  Family to provide transportation.  No CM needs identified        Magaly Martino RN

## 2023-06-09 NOTE — PLAN OF CARE
Problem: Pain Acute  Goal: Optimal Pain Control and Function  Outcome: Progressing  Intervention: Prevent or Manage Pain  Recent Flowsheet Documentation  Taken 6/9/2023 0015 by Jose Haq RN  Medication Review/Management: medications reviewed     Problem: Plan of Care - These are the overarching goals to be used throughout the patient stay.    Goal: Plan of Care Review  Description: The Plan of Care Review/Shift note should be completed every shift.  The Outcome Evaluation is a brief statement about your assessment that the patient is improving, declining, or no change.  This information will be displayed automatically on your shift note.  Outcome: Progressing  Goal: Optimal Comfort and Wellbeing  Outcome: Progressing   Goal Outcome Evaluation:    VSS. A/Ox4. No pain or nausea reported. Slept between cares.  remained at bedside. Still pumping and dumping breastmilk due to medications from procedure on evening shift.

## 2023-06-09 NOTE — PROGRESS NOTES
GI PROGRESS NOTE  6/9/2023  Amanda Miller  2000  /-34    Subjective:  She denies having abdominal pain today.  She hopes to discharge home if possible.     Objective:    Patient Vitals for the past 24 hrs:   BP Temp Temp src Pulse Resp SpO2   06/09/23 0731 114/63 99.2  F (37.3  C) Oral 72 16 97 %   06/09/23 0012 106/54 97.9  F (36.6  C) Oral 76 16 96 %   06/08/23 2010 111/58 98.1  F (36.7  C) Oral 77 18 95 %   06/08/23 1646 108/53 -- -- 67 16 --   06/08/23 1614 104/63 -- -- 72 18 --   06/08/23 1602 98/59 97.7  F (36.5  C) Oral 67 16 92 %   06/08/23 1529 (!) 133/92 -- -- 77 16 93 %   06/08/23 1515 125/66 -- -- 89 13 96 %   06/08/23 1500 123/67 -- -- 86 20 92 %   06/08/23 1459 137/70 98.7  F (37.1  C) Temporal 83 20 94 %   06/08/23 1114 103/58 98  F (36.7  C) Oral 52 16 98 %     Body mass index is 42.02 kg/m .  Gen: NAD  GI: deferred    Laboratory  Recent Labs   Lab Test 06/08/23  0644 06/07/23  1519 06/02/23  0549 06/01/23  1925 06/01/23  0711   WBC 8.2 13.3* 8.0  --  8.7   HGB 12.8 13.3 11.4*  --  12.4   MCV 84 84 84  --  86    331 283   < > 274   INR  --   --   --   --  1.01    < > = values in this interval not displayed.     Recent Labs   Lab Test 06/09/23  0653 06/08/23  0644 06/07/23  1519    139 141   POTASSIUM 4.2 4.2 4.5   CHLORIDE 102 105 103   CO2 23 23 26   BUN 15.6 8.2 11.4   CR 0.73 0.67 0.73   ANIONGAP 12 11 12   TINA 9.6 9.4 10.2*   * 82 110*     Recent Labs   Lab Test 06/09/23  0653 06/08/23  0644 06/07/23  1748 06/07/23  1519 06/02/23  0549 06/01/23  0711 05/31/23  1157 05/31/23  0526   ALBUMIN 4.1 4.0  --  4.3   < > 3.7   < >  --    BILITOTAL 0.3 0.6  --  0.7   < > 0.7   < >  --    * 189*  --  76*   < > 164*   < >  --    AST 46* 147*  --  99*   < > 63*   < >  --    ALKPHOS 264* 308*  --  328*   < > 202*   < >  --    PROTEIN  --   --  Negative  --   --   --   --  20*   LIPASE  --  676*  --  108*  --  163*  --   --     < > = values in this interval not  displayed.       MR Abdomen MRCP w/o & w Contrast    Result Date: 6/7/2023  EXAM: MR ABDOMEN MRCP W/O and W CONTRAST LOCATION: [facility] DATE/TIME: 6/7/2023 10:18 PM CDT INDICATION: Epigastric and right upper quadrant pain, slightly increased liver function tests and alk phos, cholecystectomy about 1 week ago, eval retained stone COMPARISON: CT 06/07/2023. TECHNIQUE: Routine MR liver/pancreas protocol including axial and coronal MRCP sequences. 2D and 3D reconstruction performed by MR technologist including MIP reconstruction and slab cholangiograms. If performed with contrast, additional dynamic T1 post IV contrast images. CONTRAST: 10ml Gadavist FINDINGS: MRCP: No intrahepatic or extrahepatic biliary ductal dilatation postcholecystectomy. Common bile duct 4 mm. No stenosis or suspicious filling defects. Trace fat edema in surgical bed gallbladder fossa. LIVER: Normal size, morphology, signal intensity, and enhancement. No suspicious or focal lesion. PANCREAS: Normal morphology, signal intensity, and enhancement. No suspicious or focal lesion. ADDITIONAL FINDINGS: Subcentimeter simple/benign appearing cyst right kidney. Aorta patent normal course and caliber. Portal vein patent.     IMPRESSION: 1.  No evidence of retained stones post cholecystectomy.    CT Abdomen Pelvis w Contrast    Result Date: 6/7/2023  EXAM: CT ABDOMEN PELVIS W CONTRAST LOCATION: [facility] DATE/TIME: 6/7/2023 4:32 PM CDT INDICATION: Epigastric pain, right upper quadrant pain, radiation to back, recent admission for gallstone pancreatitis and status post cholecystectomy COMPARISON: None. TECHNIQUE: CT scan of the abdomen and pelvis was performed following injection of IV contrast. Multiplanar reformats were obtained. Dose reduction techniques were used. CONTRAST: 90ml isovue 370 FINDINGS: LOWER CHEST: Normal. HEPATOBILIARY: Minimal low density edema and soft tissue stranding at gallbladder  bed. A few linear hyperdensities likely suture material. No concerning fluid collection. Liver and bile ducts normal. PANCREAS: Normal. SPLEEN: Normal. ADRENAL GLANDS: Normal. KIDNEYS/BLADDER: Tiny right renal cyst needs no follow-up, kidneys otherwise negative. BOWEL: No obstruction or inflammatory change. Appendix normal. LYMPH NODES: Normal. VASCULATURE: Unremarkable. PELVIC ORGANS: No adnexal lesion. No free fluid. MUSCULOSKELETAL: Mild soft tissue stranding related to linear port entry sites across anterior abdominal wall. No abdominal wall hematoma.     IMPRESSION: 1.  Expected postoperative appearance after cholecystectomy with mild amount of soft tissue stranding and minimal fluid density at gallbladder bed but no concerning collection. 2.  Liver, bile ducts and pancreas otherwise appear normal.    6/8/2023 EUS  Findings:        ENDOSONOGRAPHIC FINDING: :        There was no sign of significant endosonographic abnormality in the        entire main bile duct. The maximum diameter of the duct was 5 mm. No        stones, no biliary sludge, ducts of normal caliber and ducts with        regular contour were identified.                                                                                     Moderate Sedation:        GA   Impression:            - There was no sign of significant pathology in the                          entire main bile duct.                          - No specimens collected.   Recommendation:        - Return patient to [facility] for ongoing care.                          - Advance diet as tolerated.                          - Follow LFTs to normalization.     Assessment:  Cholecystitis and cholelithiasis, S/P lap leonid on 6/1 with normal IOC.  Post operatively she did well initially, and later her pain increased with elevated LFTs.  MRCP was negative, and on 6/8 EUS was normal without biliary obstruction.  Her pain has improved and LFTs are improving slightly.    Patient Active  Problem List   Diagnosis     Depression     Anxiety     Encounter for supervision of normal first pregnancy in third trimester     At risk for venous thromboembolism (VTE)     At risk for complication of pregnancy     BMI 40.0-44.9, adult (H)     Positive depression screening     Genetic screening     Echogenic intracardiac focus of fetus on prenatal ultrasound     GBS (group B Streptococcus carrier), +RV culture, currently pregnant     Failed medical induction of labor     Thin meconium stained amniotic fluid      (normal spontaneous vaginal delivery)     Acute biliary pancreatitis without infection or necrosis     Cholelithiasis     Cholecystitis     Epigastric pain        Plan:    1.  Diet per surgery.  2.  Continue to trend LFTs, and this can be done as an outpatient.  3.  If LFTs do not normalize, she should be referred to our outpatient hepatology clinic.  4.  GI will sign off today but please call if there are questions.    20 minutes of total time was spent providing patient care, including patient evaluation, reviewing documentation/test results and .                                                Theresa Madrid PA-C  Thank you for the opportunity to participate in the care of this patient.   Please feel free to call me with any questions or concerns.  Phone number [phone].                   no

## 2023-06-09 NOTE — DISCHARGE SUMMARY
[facility] MEDICINE  DISCHARGE SUMMARY     Primary Care Physician: Bety Zepeda  Admission Date: 6/7/2023   Discharge Provider: Velasquez Miguel MD Discharge Date: 6/9/2023   Diet:   Active Diet and Nourishment Order   Procedures     Regular Diet Adult     Diet       Code Status: Full Code   Activity: DCACTIVITY: Activity as tolerated        Condition at Discharge: Good     REASON FOR PRESENTATION(See Admission Note for Details)   Abdominal pain, elevated LFTs    PRINCIPAL & ACTIVE DISCHARGE DIAGNOSES     Principal Problem:    Epigastric pain      PENDING LABS     Unresulted Labs Ordered in the Past 30 Days of this Admission     No orders found from 5/8/2023 to 6/8/2023.            PROCEDURES ( this hospitalization only)      Procedure(s):  ENDOSCOPIC ULTRASOUND    RECOMMENDATIONS TO OUTPATIENT PROVIDER FOR F/U VISIT     Follow-up Appointments     Follow-up and recommended labs and tests       Follow-up in primary clinic for repeat of LFTs in 3 to 5 days.  Referral   back to [facility] if no improvement or worsening.  Return to the   emergency room for any abdominal pain or new symptoms.             {Additional follow-up instructions/to-do's for PCP    : Repeat LFTs/lipase in 3 to 5 days    DISPOSITION     Home    SUMMARY OF HOSPITAL COURSE:      22-year-old female admitted following a hospital discharge from 6 1 status post lap leonid with normal IOC.  She did well postoperatively but then redeveloped abdominal pain with elevated LFTs and lipase and was readmitted.  MRCP was negative and ED US was normal without any biliary obstruction and her pain completely resolved.  She felt hungry and tolerated a diet multiple times and is stable for discharge home.    Discharge Medications with Med changes:     Current Discharge Medication List      CONTINUE these medications which have NOT CHANGED    Details   acetaminophen (TYLENOL) 500 MG tablet Take 500-1,000 mg by  mouth every 6 hours as needed for mild pain      escitalopram (LEXAPRO) 10 MG tablet Take 1 tablet (10 mg) by mouth daily  Qty: 30 tablet, Refills: 0    Associated Diagnoses:  (normal spontaneous vaginal delivery); Positive depression screening      ibuprofen (ADVIL/MOTRIN) 600 MG tablet Take 1 tablet (600 mg) by mouth every 6 hours as needed for other (cramping)    Associated Diagnoses:  (normal spontaneous vaginal delivery)      magnesium oxide (MAG-OX) 400 MG tablet Take 400 mg by mouth daily      Prenatal Vit-Fe Fumarate-FA (PRENATAL VITAMIN PO) Take 1 tablet by mouth daily      vitamin B1 (THIAMINE) 250 MG tablet Take 250 mg by mouth daily                   Rationale for medication changes:              Consults       GASTROENTEROLOGY IP CONSULT    Immunizations given this encounter     Most Recent Immunizations   Administered Date(s) Administered     COVID-19 MONOVALENT 12+ (Pfizer) 2021     DTAP (<7y) 2004     DTaP, Unspecified 2004     HEPATITIS A (PEDS 12M-18Y) 2007     HIB(PRP-OMP)(PedvaxHIB) 1999     HPV Quadrivalent 2011, 2011     HepA, Unspecified 2011     HepB, Unspecified 2000     Hepatitis A (ADULT 19+) 2011     Hepatitis B, Adult 1999     Hepatits B (Peds <19Y) 2000     Hib, Unspecified 2000     Influenza (IIV3) PF 2011     Influenza Vaccine >6 months (Alfuria,Fluzone) 2022     MMR 2004, 2004     Pneumococcal (PCV 7) 07/10/2001, 07/10/2001     Poliovirus, inactivated (IPV) 2004, 2004     TDAP (Adacel,Boostrix) 2023     TRIHIBIT (DTAP/HIB, <7y) 2000     Varicella 2011, 2011           Anticoagulation Information      Recent INR results: No results for input(s): INR in the last 168 hours.  Warfarin doses (if applicable) or name of other anticoagulant:       SIGNIFICANT IMAGING FINDINGS     Results for orders placed or performed during the hospital encounter  of 06/07/23   CT Abdomen Pelvis w Contrast    Impression    IMPRESSION:   1.  Expected postoperative appearance after cholecystectomy with mild amount of soft tissue stranding and minimal fluid density at gallbladder bed but no concerning collection.  2.  Liver, bile ducts and pancreas otherwise appear normal.   MR Abdomen MRCP w/o & w Contrast    Impression    IMPRESSION:  1.  No evidence of retained stones post cholecystectomy.       SIGNIFICANT LABORATORY FINDINGS     Most Recent 2 LFT's:Recent Labs   Lab Test 06/09/23  0653 06/08/23  0644   AST 46* 147*   * 189*   ALKPHOS 264* 308*   BILITOTAL 0.3 0.6           Discharge Orders        Reason for your hospital stay    Abdominal pain, elevated LFTs     Follow-up and recommended labs and tests     Follow-up in primary clinic for repeat of LFTs in 3 to 5 days.  Referral back to [facility] if no improvement or worsening.  Return to the emergency room for any abdominal pain or new symptoms.     Activity    Your activity upon discharge: activity as tolerated     Diet    Follow this diet upon discharge: Orders Placed This Encounter      Regular Diet Adult       Examination   Physical Exam   Temp:  [97.7  F (36.5  C)-99.2  F (37.3  C)] 98.3  F (36.8  C)  Pulse:  [67-89] 70  Resp:  [13-20] 16  BP: ()/(53-92) 104/56  SpO2:  [92 %-97 %] 95 %  Wt Readings from Last 1 Encounters:   06/07/23 97.6 kg (215 lb 2.7 oz)       General: No apparent distress, she feels back to her normal baseline self  Abdomen: No pain  Lungs: No abnormal breath sounds      Please see EMR for more detailed significant labs, imaging, consultant notes etc.    I, Velasquez Miguel MD, personally saw the patient today and spent greater than 30 minutes discharging this patient.    Velasquez Miguel MD  [facility]    CC:Clinic, [facility]

## 2023-06-12 NOTE — PROGRESS NOTES
Amanda seen for routine prenatal visit at 38w 3d with significant other Rojelio. Over all feeling well, couple is ready to meet their baby!   Amanda continues to have sacral discomfort. Reports she is doing some stretching and gentle massage at home which is somewhat helpful. Educated pt to visit the spinning babies and expecting and empowered web sites for additional stretching and exercise activities that may be helpful for her discomfort.   Pt denies any headache, visual disturbance, right upper gastric pain, vaginal bleeding, or leaking of fluids.   Would like cervical assessment at next visit. Also interested in scheduling induction if baby has not arrived.   39 week CNM prenatal appointment is scheduled along with MFM follow up (due to pregravid BMI 43) and radiology.  Patient is open to SVE next visit and to schedule IOL.    Kacie Razo, Student JULIO CÉSAR    I was present with the student who participated in the service and the documentation of the note.  I have verified the history, and personally performed the physical exam and medical decision making.  I agree with the plan as documented by the student and edited by me.    BLAYNE Nelson, RADHA

## 2023-06-13 ENCOUNTER — PRENATAL OFFICE VISIT (OUTPATIENT)
Dept: MIDWIFE SERVICES | Facility: CLINIC | Age: 23
End: 2023-06-13
Payer: COMMERCIAL

## 2023-06-13 VITALS
DIASTOLIC BLOOD PRESSURE: 70 MMHG | HEART RATE: 76 BPM | WEIGHT: 210 LBS | HEIGHT: 61 IN | SYSTOLIC BLOOD PRESSURE: 100 MMHG | BODY MASS INDEX: 39.65 KG/M2

## 2023-06-13 PROCEDURE — 99207 PR POST PARTUM EXAM: CPT | Performed by: MIDWIFE

## 2023-06-13 ASSESSMENT — ANXIETY QUESTIONNAIRES
3. WORRYING TOO MUCH ABOUT DIFFERENT THINGS: MORE THAN HALF THE DAYS
GAD7 TOTAL SCORE: 6
1. FEELING NERVOUS, ANXIOUS, OR ON EDGE: SEVERAL DAYS
IF YOU CHECKED OFF ANY PROBLEMS ON THIS QUESTIONNAIRE, HOW DIFFICULT HAVE THESE PROBLEMS MADE IT FOR YOU TO DO YOUR WORK, TAKE CARE OF THINGS AT HOME, OR GET ALONG WITH OTHER PEOPLE: SOMEWHAT DIFFICULT
2. NOT BEING ABLE TO STOP OR CONTROL WORRYING: MORE THAN HALF THE DAYS
6. BECOMING EASILY ANNOYED OR IRRITABLE: SEVERAL DAYS
GAD7 TOTAL SCORE: 6
7. FEELING AFRAID AS IF SOMETHING AWFUL MIGHT HAPPEN: NOT AT ALL
5. BEING SO RESTLESS THAT IT IS HARD TO SIT STILL: NOT AT ALL

## 2023-06-13 ASSESSMENT — PATIENT HEALTH QUESTIONNAIRE - PHQ9: 5. POOR APPETITE OR OVEREATING: NOT AT ALL

## 2023-06-13 NOTE — PROGRESS NOTES
"Routine Postpartum Visit      Subjective:      Amanda is a 22 year old year old female who presents to clinic today for a postpartum visit.  She is currently 6 weeks postpartum of a  birth.  The birth happened on 23.  The baby was 40 weeks gestation at the time of the birth.  She attends the visit today with Meera Ocasio and baby \"Harriet\".    Amanda's postpartum course has been complicated. She had a gallstone pancreatitis and status post cholecystectomy (and pancreatitis) on 23 then readmitted for pain. Feeling better now.  Baby Harreit's  course has been going well, sometimes gassy but resolved.     Feelings about how her birth went:  \"It went pretty well\". Glad she went home for awhile and came back.  Bleeding Status:  Bled for 3 1/2 weeks.  Currently has no bleeding  Sleeping Status:  Ok for having a   Eating:  Well and has appetite back.  Bowel/Bladder Function:  Bowel function is normal. Bladder function is normal.  No SI.  Exercise:  Minimal because of surgery.  [place]:  Yes has resumed intercourse.  Ok. Used condoms.  Pain:  none.  Contraceptive Plans:  Planned contraception method is condoms.  Family planning--will wait 6 months to a year before trying for conception.  Baby's Feeding Method:  Baby is feeding by breast: pumping too.  Mental Health/Mood Status:  Feeling like she \"has a little PPD, feels better on Lexapro, but forgot to take it while in hospital for gallbladder surgery and pancreatitis\".  [place]  Depression Scale score of 9/30 today.  \"Never\" to # 10.  Patient states that she tried a couple times to connect with a therapist but it did not work.  She will try again by calling the number on the back of her insurance card. Pt knows she can call the CNM's and numbers reviewed.  She states she feels stable on her Lexapro 10 mg daily.  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      Review of Systems  -A 12 point comprehensive review of systems was " negative except as noted above.  -Prenatal history and intrapartum course were also reviewed today.        Objective:        There were no vitals filed for this visit.    Physical Exam:    General Appearance: Pleasant, articulate, well-groomed, well-nourished female.  Not in any apparent distress.    Neck: Supple, symmetrical, no adenopathy.  Thyroid:  Not enlarged, symmetric, no tenderness/mass/nodules  Back: no CVA tenderness  Breasts: Symmetrical, non-tender.  No masses, adenopathy, or nipple discharge.  Abdomen: Soft, non-tender, no masses, no diastasis.  Pelvic:  -External genitalia:  Normal hair distribution, no lesion. Birth laceration site healed very well.    -declined the rest of the pelvic exam today  Extremities: Extremities no edema, no varicosities.    Hgb on 23: 12.8      Assessment/Plan:     1)  Normal postpartum visit  2)  Contraceptive plans include:  condoms.  Educated about this method.  Pt and  aware. Will wait 6 mos to 1 yr to try again.    3)  General postpartum education completed including:  Self-care of physical and emotional needs in this new postpartum period, return of fertility, resumption of intercourse, discussion of general health maintenance, sleep needs, required support of family/friends/community, and watching for signs/symptoms of  mood and anxiety disorders.  4)  Recommended talk therapy and resources given.  5) pap due 2024, pt will RTC then for annual exam.    Total time spent on the date of this encounter doing chart review, review of test results, patient visit, the physical exam & procedure, education, counseling, developing this plan of care, and documenting = 40 minutes.       Rin CISNEROS CNM     Negative

## 2023-06-13 NOTE — ADDENDUM NOTE
Addended by: AGUILA SOLORZANO on: 6/13/2023 04:26 PM     Modules accepted: Orders    
Detail Level: Detailed

## 2023-06-15 ENCOUNTER — MYC MEDICAL ADVICE (OUTPATIENT)
Dept: MIDWIFE SERVICES | Facility: CLINIC | Age: 23
End: 2023-06-15
Payer: COMMERCIAL

## 2023-06-15 DIAGNOSIS — Z34.03 ENCOUNTER FOR SUPERVISION OF NORMAL FIRST PREGNANCY IN THIRD TRIMESTER: Primary | ICD-10-CM

## 2023-06-16 ENCOUNTER — OFFICE VISIT (OUTPATIENT)
Dept: FAMILY MEDICINE | Facility: CLINIC | Age: 23
End: 2023-06-16
Payer: COMMERCIAL

## 2023-06-16 VITALS
BODY MASS INDEX: 39.65 KG/M2 | OXYGEN SATURATION: 98 % | SYSTOLIC BLOOD PRESSURE: 132 MMHG | DIASTOLIC BLOOD PRESSURE: 85 MMHG | WEIGHT: 210 LBS | HEIGHT: 61 IN | HEART RATE: 77 BPM | TEMPERATURE: 98 F

## 2023-06-16 DIAGNOSIS — H00.014 HORDEOLUM EXTERNUM OF LEFT UPPER EYELID: ICD-10-CM

## 2023-06-16 DIAGNOSIS — R79.89 ELEVATED LFTS: Primary | ICD-10-CM

## 2023-06-16 LAB
ALBUMIN SERPL BCG-MCNC: 4.5 G/DL (ref 3.5–5.2)
ALP SERPL-CCNC: 143 U/L (ref 35–104)
ALT SERPL W P-5'-P-CCNC: 29 U/L (ref 0–50)
AST SERPL W P-5'-P-CCNC: 21 U/L (ref 0–45)
BILIRUB DIRECT SERPL-MCNC: <0.2 MG/DL (ref 0–0.3)
BILIRUB SERPL-MCNC: 0.4 MG/DL
LIPASE SERPL-CCNC: 77 U/L (ref 13–60)
PROT SERPL-MCNC: 8 G/DL (ref 6.4–8.3)

## 2023-06-16 PROCEDURE — 80076 HEPATIC FUNCTION PANEL: CPT | Performed by: PHYSICIAN ASSISTANT

## 2023-06-16 PROCEDURE — 83690 ASSAY OF LIPASE: CPT | Performed by: PHYSICIAN ASSISTANT

## 2023-06-16 PROCEDURE — 99203 OFFICE O/P NEW LOW 30 MIN: CPT | Performed by: PHYSICIAN ASSISTANT

## 2023-06-16 PROCEDURE — 36415 COLL VENOUS BLD VENIPUNCTURE: CPT | Performed by: PHYSICIAN ASSISTANT

## 2023-06-16 RX ORDER — POLYMYXIN B SULFATE AND TRIMETHOPRIM 1; 10000 MG/ML; [USP'U]/ML
1-2 SOLUTION OPHTHALMIC EVERY 4 HOURS
Qty: 10 ML | Refills: 0 | Status: SHIPPED | OUTPATIENT
Start: 2023-06-16 | End: 2023-10-23

## 2023-06-16 NOTE — PROGRESS NOTES
"  Assessment & Plan     Elevated LFTs  S/p lap cholecystectomy followed by readmission for abdominal pain and elevated LFTs.  All symptoms have resolved denies abdominal pain nausea vomiting diarrhea we will recheck LFTs today  - Hepatic panel (Albumin, ALT, AST, Bili, Alk Phos, TP)  - Lipase  - Hepatic panel (Albumin, ALT, AST, Bili, Alk Phos, TP)  - Lipase    Hordeolum externum of left upper eyelid  New, at beginning stage  - trimethoprim-polymyxin b (POLYTRIM) 22383-3.1 UNIT/ML-% ophthalmic solution  Dispense: 10 mL; Refill: 0      }     MED REC REQUIRED  Post Medication Reconciliation Status:  Discharge medications reconciled, continue medications without change    BMI:   Estimated body mass index is 39.68 kg/m  as calculated from the following:    Height as of this encounter: 1.549 m (5' 1\").    Weight as of this encounter: 95.3 kg (210 lb).   Weight management plan: not addressed today    Follow up with pcp    Mary Sullivan PA-C  [facility]    Keysha Patel is a 22 year old, presenting for the following health issues:  Hospital F/U (6/7-9 [place] for abdominal pain, gall bladder removal on 6/1, recently has a baby April 29th. )        6/16/2023    12:04 PM   Additional Questions   Roomed by Area F   Accompanied by Sister-in-law     HPI     Admitted 5/30/2023 for acute biliary pancreatitis secondary to cholelithiasis and had lap leonid.  Readmited 6/7/2023 for abdominal pain and elevated lfts.  Had endoscopic ultrasound that was normal without biliary obstruction.  Her lfts trended down and pain completely resolved. Needs recheck of lfts today, denies abdominal pain, n/v/d.  Denies fevers, chills or body aches    Gets frequent styes, feels one starting left upper eyelid, requesting antibiotic eye drops    Review of Systems   Constitutional, HEENT, cardiovascular, pulmonary, gi and gu systems are negative, except as otherwise noted.      Objective    /85 (BP " "Location: Left arm, Patient Position: Sitting, Cuff Size: Adult Large)   Pulse 77   Temp 98  F (36.7  C) (Oral)   Ht 1.549 m (5' 1\")   Wt 95.3 kg (210 lb)   LMP 07/06/2022   SpO2 98%   Breastfeeding Yes   BMI 39.68 kg/m    Body mass index is 39.68 kg/m .  Physical Exam   GENERAL: healthy, alert and no distress  RESP: lungs clear to auscultation - no rales, rhonchi or wheezes  CV: regular rate and rhythm, normal S1 S2, , click or rub, no peripheral edema and peripheral pulses strong  ABDOMEN: soft, nontender, no hepatosplenomegaly, no masses and bowel sounds normal  MS: no gross musculoskeletal defects noted, no edema  NEURO: Normal strength and tone, mentation intact and speech normal  PSYCH: mentation appears normal, affect normal/bright                    "

## 2023-06-17 ENCOUNTER — NURSE TRIAGE (OUTPATIENT)
Dept: NURSING | Facility: CLINIC | Age: 23
End: 2023-06-17
Payer: COMMERCIAL

## 2023-06-17 NOTE — TELEPHONE ENCOUNTER
Pt is phoning needing a refill on her Lexapro and will be connecting with provider on Monday to have it filled     No triage     Salma Nye RN  [facility] Nurse Advisor  10:24 AM 6/17/2023      Reason for Disposition    General information question, no triage required and triager able to answer question    Additional Information    Negative: [1] Caller is not with the adult (patient) AND [2] reporting urgent symptoms    Negative: Lab result questions    Negative: Medication questions    Negative: Caller can't be reached by phone    Negative: Caller has already spoken to PCP or another triager    Negative: RN needs further essential information from caller in order to complete triage    Negative: Requesting regular office appointment    Negative: [1] Caller requesting NON-URGENT health information AND [2] PCP's office is the best resource    Negative: Health Information question, no triage required and triager able to answer question    Protocols used: INFORMATION ONLY CALL - NO TRIAGE-A-

## 2023-09-19 ENCOUNTER — DOCUMENTATION ONLY (OUTPATIENT)
Dept: PEDIATRICS | Facility: CLINIC | Age: 23
End: 2023-09-19
Payer: COMMERCIAL

## 2023-09-19 NOTE — PROGRESS NOTES
[facility] Lactation Visit    Assessment:     difficulty in feeding at breast  - cholecalciferol (D-VI-SOL, VITAMIN D3) 10 mcg/mL (400 units/mL) LIQD liquid  Dispense: 50 mL; Refill: 11     Harriet Delgado is a 4 month old old female infant born at 40 weeks and 5 days here for lactation support.    Mother is interested and breast-feeding.  She has had difficulty with latching infant previously.  Infant is gaining adequate weight, but main caloric intake is from bottlefeeding.  Mother has not breast-fed infants in the last month.  Attempted to assist with latching today.  Infant required 20 mm nipple shield to sustain or encourage sucking.  Mother tried latching infant for 2 to 3 minutes and decided to stop nursing due to infant crying.  Infant also recently fed via bottle right before appointment time.    Reviewed recommendations of encouraging to latch infant so that infant can become more familiarized with nursing.     Plan:    Harriet is growing well.    As discussed, below the feeding recommendations for Harriet Delgado:    Feeding plan:continue to feed baby based on her cues.     If interested in breast-feeding baby. Offer breast before bottles as much as possible. Use the nipple shield only as needed. You can try drops of breast-milk on the external nipple shield to encourage sucking.     Pumping plan:  continue with pumping 4-5 times a day.     Harriet Delgado should start Vitamin D 400 international units, once daily.    Follow up with your primary care provider for 6 month wellness visit.    Maternal nipple care:   Best way to help decrease nipple soreness is to obtain a deep latch. When you pump, nipples should not touch the sides of the flange. Apply lanolin or coconut oil after breast-feeding or pumping. Wipe away left over residue before next breast-feeding or pumping. Allow nipples to air dry as much as possible to help stimulate healing. If mother is experiencing  persistent breast pain, flu-like symptoms, localized breast tenderness/redness/warmness, or fevers, please contact mother's primary care provider or Obstetrician/midwife for further evaluation.    Return to clinic sooner or call clinic sooner for any worsening symptoms (inconsolability, fever greater than 100.4F, less wet diapers, no stools, sleepiness, difficulty feeding, abdominal distention).    For further lactation concerns, please call [phone].   ____________________________________________________________________  SUBJECTIVE:     Harriet Delgado is a 4 month old old female infant born at Gestational Age: 40w5d via Vaginal, Spontaneous delivery on 2023 at 7:59 AM here for lactation support. This patient is accompanied in the office by her mother.     Concerns: difficulty latching. Baby is mainly bottle-feeding with pumped milk. Baby gets about 3.5 oz every 2-3 hours. Baby last latched a month ago. This latch lasted for 1 minute. Mom fed baby before the visit, because infant was cryhing.    Baby has about 7-8 wet diapers and 1 stools every 2-3 days. Stools are  yellow in color.    Mom is also pumping about 4-5 per day and gets about 9 oz per pumping session. Mom noticed her breasts grew larger and areolas darkened during pregnancy and she noticed primary engorgement when her milk came in on day 3-5.    Breastfeeding Goals: would like exclusively give breast milk. Would like to try breast-feeding.    Previous Breastfeeding Experience: first baby    Breast-surgery: none. No PCOS, hypertension, diabetes or thyroid disease    Maternal medications: citalopram, prenatal vitamin, vitamin D and vitamin K, calcium/zinc/magnesium  Infant medications: none      Patient Active Problem List    Diagnosis Date Noted    Staring episodes 2023     Priority: Medium    Passage of meconium during delivery affecting  2023     Priority: Medium    Positive direct antiglobulin test (TIMOTHY) 2023      Priority: Medium     No results found for any visits on 09/19/23.    Current Outpatient Medications:     cholecalciferol (D-VI-SOL, VITAMIN D3) 10 mcg/mL (400 units/mL) LIQD liquid, Take 1 mL (10 mcg) by mouth daily, Disp: 50 mL, Rfl: 11    trimethoprim-polymyxin b (POLYTRIM) 04667-9.1 UNIT/ML-% ophthalmic solution, Place 1 drop Into the left eye 4 times daily until 24 hours after clear (Patient not taking: Reported on 9/1/2023), Disp: 10 mL, Rfl: 0  No past medical history on file.  No past surgical history on file.  Family History   Problem Relation Age of Onset    Anxiety Disorder Mother     No Known Problems Maternal Grandmother     No Known Problems Maternal Grandfather     No Known Problems Paternal Grandmother     Heart Disease Paternal Grandfather        Primary care provider: Eileen Vaughan    OBJECTIVE:    Mother:   Nipples are everted, the areola is compressible, the breast is soft and full.     Sore nipples: none   Maternal pain: none    Maternal depression screening: Doing well    Infant:   Vitals:    09/19/23 1401   Temp: 99.6  F (37.6  C)   Weight: 14 lb 14 oz (6.747 kg)        Weight:   Birthweight: 6 lbs 13.35 oz  Today's weight: 14 lbs 14 oz    118% from birth weight.    Amount of milk transferred from LEFT side: mother declines.  Amount of milk transferred from RIGHT side: attempted     Total transfer: 0 oz    Feeding assessment:     Infant palate is intact, tongue over gums, normal frenulum.   Infant unable to sustain a latch. Utilized 20 mm nipple shield and drops of breast milk on the external shield to encourage sucking. Infant was unable to sustain a latch.     Alignment: Infant's head was aligned with its trunk. Infant did face mother. Baby was in cross-cradle position today. Discussed importance of infant ventral positioning to obtain a deeper latch.     Areolar Grasp: Infant was able to open mouth wide. Infant's lips were not pursed. Infant's lips did flange outward. Tongue was visible just  "barely over bottom lip. Infant had complete seal only for a few seconds.    Areolar Compression: No rhythmic motion. There were no clicking or smacking sounds. There was no severe nipple discomfort.  Nipples appeared round after feeding.    Audible swallowing: no audible swallows..   .     PHYSICAL EXAM    Gen: Alert, no acute distress.   Head: Anterior and posterior fontanelles are flat and soft.   Eyes: No eye drainage. Sclera clear.   Ears: Pinna appear well-formed. No pits.   Nose: nares patent. No nasal congestion. No nasal flaring.  Mouth: Oral mucosa moist. Tongue midline (tongue elevation adequate when crying, good lateralization). Frenulum palpable. No \"heart-shaped\" tongue. Tongue able to extend pass lower gumline. Lips closed at rest.   Neck: Clavicles intact bilaterally.  Lungs: Clear to auscultation bilaterally.   Cardiac: Regular regular rate and rhythm, S1S2, no murmurs. Brachial and femoral pulses +2 and equal.  Abdomen: Soft, nontender, bowel sounds present, no hepatosplenomegaly or mass palpable. Umbilicus dry with no erythema or drainage.   : Shilo stage 1 female genitalia  Skin: Intact. Dry. No rash. No jaundice.   Musculoskeletal: equal movements of upper and lower extremities. Negative Ortolani and Shay maneuver.  Neuro: Appropriate muscle tone.    The visit lasted a total of 35   minutes that I spent face to face with the patient. Of that time, 35 minutes were spent on lactation. Over 50% of the time was spent counseling and educating the patient about normal  care and growth, breast-feeding, latching, milk production, pumping.    Completed by:   Elijah Hogna, APRN, CPNP, IBCLC  [facility]  [facility]  2023, 2:15 PM  "

## 2023-10-23 ENCOUNTER — OFFICE VISIT (OUTPATIENT)
Dept: FAMILY MEDICINE | Facility: CLINIC | Age: 23
End: 2023-10-23
Payer: COMMERCIAL

## 2023-10-23 VITALS
HEART RATE: 72 BPM | DIASTOLIC BLOOD PRESSURE: 62 MMHG | RESPIRATION RATE: 16 BRPM | TEMPERATURE: 97.9 F | BODY MASS INDEX: 41.52 KG/M2 | HEIGHT: 60 IN | SYSTOLIC BLOOD PRESSURE: 120 MMHG | WEIGHT: 211.5 LBS | OXYGEN SATURATION: 97 %

## 2023-10-23 DIAGNOSIS — Z90.49 HISTORY OF CHOLECYSTECTOMY: Primary | ICD-10-CM

## 2023-10-23 DIAGNOSIS — R79.89 ELEVATED LFTS: ICD-10-CM

## 2023-10-23 PROCEDURE — 99213 OFFICE O/P EST LOW 20 MIN: CPT | Performed by: NURSE PRACTITIONER

## 2023-10-23 NOTE — PROGRESS NOTES
Answers submitted by the patient for this visit:  General Questionnaire (Submitted on 10/23/2023)  Chief Complaint: Chronic problems general questions HPI Form  What is the reason for your visit today? : follow up lab work for liver enzymes  How many servings of fruits and vegetables do you eat daily?: 2-3  On average, how many sweetened beverages do you drink each day (Examples: soda, juice, sweet tea, etc.  Do NOT count diet or artificially sweetened beverages)?: 1  How many minutes a day do you exercise enough to make your heart beat faster?: 9 or less  How many days a week do you exercise enough to make your heart beat faster?: 3 or less  How many days per week do you miss taking your medication?: 1    Assessment & Plan     History of cholecystectomy  *    Elevated LFTs    -I reviewed prior chart notes, imaging, and labs.  Labs were trending down 1 week postop.  She is now 3 months postop, and has not had any symptoms such liver dysfunction or pancreatic dysfunction.  She is young, and no prior history of liver enzyme elevation.  I do not feel that the patient requires additional lab work today.  If symptoms were to change, which were discussed today, she will follow-up and get them done at that time.  She verbalized understanding and in agreement.  She really did not want the labs done today.                  BLAYNE Chambers [facility]   Amanda is a 23 year old, presenting for the following health issues:  Follow Up (Gallbladder removal in may and follow up liver enzymes and recommended follow up lab work)      10/23/2023     3:18 PM   Additional Questions   Roomed by debra     - Admitted 5/30/2023 for acute biliary pancreatitis secondary to cholelithiasis and had lap leonid.  Readmited 6/7/2023 for abdominal pain and elevated lfts.  Had endoscopic ultrasound  and MRI that was normal without biliary obstruction.  Her lfts trended down and her pain  is completely resolved. Wondering if she needs to recheck her lfts today. She denies abdominal pain, n/v/d, yellow skin coloring.  Denies fevers, chills or body aches.        History of Present Illness       Reason for visit:  Follow up lab work for liver enzymes    She eats 2-3 servings of fruits and vegetables daily.She consumes 1 sweetened beverage(s) daily.She exercises with enough effort to increase her heart rate 9 or less minutes per day.  She exercises with enough effort to increase her heart rate 3 or less days per week. She is missing 1 dose(s) of medications per week.         Concern - follow up lab work/ gallbladder out  Onset: may  Description: abnormal liver enzymes             Review of Systems   Constitutional, HEENT, cardiovascular, pulmonary, gi and gu systems are negative, except as otherwise noted.      Objective    /62   Pulse 72   Temp 97.9  F (36.6  C)   Resp 16   Ht 1.524 m (5')   Wt 95.9 kg (211 lb 8 oz)   SpO2 97%   Breastfeeding Yes   BMI 41.31 kg/m    Body mass index is 41.31 kg/m .  Physical Exam   GENERAL: healthy, alert and no distress  ABDOMEN: soft, nontender, no hepatosplenomegaly, no masses and bowel sounds normal  MS: no gross musculoskeletal defects noted, no edema  PSYCH: mentation appears normal, affect normal/bright    Office Visit on 06/16/2023   Component Date Value Ref Range Status    Protein Total 06/16/2023 8.0  6.4 - 8.3 g/dL Final    Albumin 06/16/2023 4.5  3.5 - 5.2 g/dL Final    Bilirubin Total 06/16/2023 0.4  <=1.2 mg/dL Final    Alkaline Phosphatase 06/16/2023 143 (H)  35 - 104 U/L Final    AST 06/16/2023 21  0 - 45 U/L Final    Reference intervals for this test were updated on 6/12/2023 to more accurately reflect our healthy population. There may be differences in the flagging of prior results with similar values performed with this method. Interpretation of those prior results can be made in the context of the updated reference intervals.    ALT  06/16/2023 29  0 - 50 U/L Final    Reference intervals for this test were updated on 6/12/2023 to more accurately reflect our healthy population. There may be differences in the flagging of prior results with similar values performed with this method. Interpretation of those prior results can be made in the context of the updated reference intervals.      Bilirubin Direct 06/16/2023 <0.20  0.00 - 0.30 mg/dL Final    Lipase 06/16/2023 77 (H)  13 - 60 U/L Final

## 2023-10-30 ENCOUNTER — TRANSFERRED RECORDS (OUTPATIENT)
Dept: HEALTH INFORMATION MANAGEMENT | Facility: CLINIC | Age: 23
End: 2023-10-30

## 2023-10-31 ENCOUNTER — MEDICAL CORRESPONDENCE (OUTPATIENT)
Dept: HEALTH INFORMATION MANAGEMENT | Facility: CLINIC | Age: 23
End: 2023-10-31
Payer: COMMERCIAL

## 2023-12-18 ENCOUNTER — OFFICE VISIT (OUTPATIENT)
Dept: MIDWIFE SERVICES | Facility: CLINIC | Age: 23
End: 2023-12-18
Payer: COMMERCIAL

## 2023-12-18 VITALS — DIASTOLIC BLOOD PRESSURE: 68 MMHG | SYSTOLIC BLOOD PRESSURE: 112 MMHG

## 2023-12-18 DIAGNOSIS — N89.8 VAGINAL DISCHARGE: ICD-10-CM

## 2023-12-18 DIAGNOSIS — R35.0 URINARY FREQUENCY: ICD-10-CM

## 2023-12-18 DIAGNOSIS — R39.15 URINARY URGENCY: ICD-10-CM

## 2023-12-18 DIAGNOSIS — R30.9 PAIN WITH URINATION: Primary | ICD-10-CM

## 2023-12-18 DIAGNOSIS — N89.8 VAGINAL ITCHING: ICD-10-CM

## 2023-12-18 LAB
ALBUMIN UR-MCNC: NEGATIVE MG/DL
APPEARANCE UR: CLEAR
BACTERIA #/AREA URNS HPF: ABNORMAL /HPF
BILIRUB UR QL STRIP: NEGATIVE
CLUE CELLS: ABNORMAL
COLOR UR AUTO: YELLOW
GLUCOSE UR STRIP-MCNC: NEGATIVE MG/DL
HGB UR QL STRIP: ABNORMAL
KETONES UR STRIP-MCNC: NEGATIVE MG/DL
LEUKOCYTE ESTERASE UR QL STRIP: ABNORMAL
MUCOUS THREADS #/AREA URNS LPF: PRESENT /LPF
NITRATE UR QL: NEGATIVE
PH UR STRIP: 5.5 [PH] (ref 5–8)
RBC #/AREA URNS AUTO: ABNORMAL /HPF
SP GR UR STRIP: >=1.03 (ref 1–1.03)
SQUAMOUS #/AREA URNS AUTO: ABNORMAL /LPF
TRICHOMONAS, WET PREP: ABNORMAL
UROBILINOGEN UR STRIP-ACNC: 0.2 E.U./DL
WBC #/AREA URNS AUTO: ABNORMAL /HPF
WBC'S/HIGH POWER FIELD, WET PREP: ABNORMAL
YEAST, WET PREP: ABNORMAL

## 2023-12-18 PROCEDURE — 87086 URINE CULTURE/COLONY COUNT: CPT | Performed by: ADVANCED PRACTICE MIDWIFE

## 2023-12-18 PROCEDURE — 87210 SMEAR WET MOUNT SALINE/INK: CPT | Performed by: ADVANCED PRACTICE MIDWIFE

## 2023-12-18 PROCEDURE — 99214 OFFICE O/P EST MOD 30 MIN: CPT | Performed by: ADVANCED PRACTICE MIDWIFE

## 2023-12-18 PROCEDURE — 87186 SC STD MICRODIL/AGAR DIL: CPT | Performed by: ADVANCED PRACTICE MIDWIFE

## 2023-12-18 PROCEDURE — 81001 URINALYSIS AUTO W/SCOPE: CPT | Performed by: ADVANCED PRACTICE MIDWIFE

## 2023-12-18 NOTE — PROGRESS NOTES
Office Visit      Subjective:    Amanda is a 23 year old female who presents for evaluation of urinary and vaginal symptoms.    Has been having UTI symptoms for the past 3+ days.  Feeling the constant need to pee.  Also having a stinging/burning pain in the urethral area with urination. Is early pregnant, but this sensation to urinate a lot is not related to the pregnancy, instead it feels infective.    Having a change in vaginal discharge too (more volume).  And, is having itching on her labia.  Has had a vaginal infection in the past and this feels similar to what that felt like then.  No obvious odor noted.    Is pregnant, with LMP 10/30/23.  Got a positive pregnancy test 2 weeks ago.  Is taking a PNV.        Review of Systems:  Also a 12 point comprehensive review of systems was negative except as noted.         Objective:   Vitals:    Vitals:    12/18/23 1417   BP: 112/68   BP Location: Right arm   Patient Position: Sitting   Cuff Size: Adult Large       Abdomen: Soft, nontender, no masses, negative CVAT.  External genitalia: Normal hair distribution, no lesions.  Urethral opening: Without lesions, or tenderness.   Bladder: Without masses, or tenderness.  Vagina: Pink, rugated, normal-appearing discharge.  Wet prep obtained.  Bimanual: deferred today.    UA also obtained during clinic visit.       Assessment/Plan:  1)  Urinary and vaginal symptoms of dysuria/urgency/frequency, and increased vaginal discharge/itching.  Wet prep and UA (possibly UC) pending.  Will plan to treat appropriately once diagnosis made.  2)  Early pregnancy.  Discussed consistent use of prenatal vitamin.  Saw our CNM group last pregnancy, but was considering switching to a physician group this pregnancy and was hoping to see a single provider for all of pregnancy and delivery.  Discussed that the Ob/GYN physicians practice in a similar what that the CNMs do, with clinic visits with one or several providers, and then delivery with  whichever provider is on.  Talked about the upcoming change to our CNM practice, where we are splitting in half (there will be a [facility] practice, and a [facility] practice).  With this new system change, she'd be able to get to know a smaller group of midwives, and be more assured that she'd see a midwife she knows or has met when she is in labor.   After hearing this information, Amanda will consider her options further but likely will see the CNMs again for her care.  Plans IOB visit between 8-12 weeks gestation.       25 minutes on the date of the encounter doing chart review, review of test results, interpretation of tests, patient visit, and documentation

## 2023-12-19 LAB — BACTERIA UR CULT: ABNORMAL

## 2023-12-19 NOTE — PATIENT INSTRUCTIONS
"\"We hope you had a positive experience and that you can definitely recommend [facility] to your family and friends. You ll be receiving a survey soon and we look forward to hearing your feedback\".    Welcome to [facility]   and thank you for choosing us for your maternity care provider!  Congratulations!      AirClichart  After each of your visits you are welcome to check ShopSuey for your visit summary including education and links to information relevant to your pregnancy and/or well woman care.   Find the \"Visits\" tab at the top of the page, you will see a list of recent visits and for each visit a for link for \"View After Visit Summary.\" View of your After Visit Summary will allow you to read our recommendations from your visit, review any education provided, and link to websites with useful information.   If you have any questions or difficulty navigating [place], please feel free to contact us and we will do our best to direct you.  Meet the Midwives from [facility]  You are invited to an informational meet and greet with [facility]'s [facility] Certified Nurse-Midwives. Our free \"Meet the Midwives\" event is a great opportunity to learn about our midwives' philosophy and experience, the hospitals where we can assist with your birth, and answer questions you may have. Partners, friends, and family are welcome to attend. Currently, this is a virtual event.  Date  Last Thursday of every month at 7 pm.    Link to next (live) meeting   [email or web address]  To Join by Telephone (audio only) Call:   [phone] Phone Conference ID: [phone] #    Contact information:  Appointment line and to get a hold of CNM in clinic Monday-Friday 8 am - 5 pm:  [phone].  There are some clinics with early start times (1st appointment 7:40 am) and others with evening hours (last appointment 6:20 pm).  Most are typically open from 8 " am to 5 pm.    CNM on call answering service: [phone].  Specify your hospital of choice and leave a brief message for CNM;  will then page CNM who is on call at your specified hospital and you should receive a call back with 15 minutes.  Be sure that your ringer is audible and that you can accept blocked calls so that we can get back in touch with you! This number should be reserved for urgent needs if during the day, before 8 am, after 5 pm, weekends, holidays.      We support all families in their infant feeding journey. We'll provide education on Breastfeeding/Chestfeeding early and often to help you achieve your goals. Please let us know if you have questions along the way!      Breastfeeding: a Healthy Option for You and Your Baby  Consider breastfeeding for the healthiest way to feed your baby. Ask your midwife or physician for more information.     The choice of how you will feed your baby is important.  Before your baby s birth, you ll want to learn about the benefits of breastfeeding.  [facility] continue to support Baby Friendly standards; an initiative that was created by the World Health Organization and UNICEF.  This helps give you and your baby the best start in feeding their baby.    Why should I breastfeed my baby?   Babies are less likely to develop childhood obesity or diabetes   Babies are less likely to suffer from recurrent ear infections   Babies are less likely to be hospitalized for respiratory conditions   Breast milk is rich in nutrients and antibodies-it is easy to digest    How does it benefit me?   Lowers the risk for diabetes, breast and ovarian cancer and postpartum depression   Moms can lose  baby weight  more quickly   Cost savings - formula can cost well over $1,500 per year   Convenient - no bottles and nipples to sterilize, no measuring and mixing formula   The physical contact with  breastfeeding can make babies feel secure, warm and comforted     What about formula?  While you and your baby are staying with us at [facility], we will support whatever feeding choice you make for your baby.    Some important considerations:    The American Academy of Pediatrics, the World Health Organization, and many more organizations recommend exclusive breastfeeding for 6 months and continued breastfeeding while adding other foods for the first 1-2 years.    Any amount of breastmilk has benefits to both baby and mother.  Giving formula in replacement of breastfeeding can affect mother s milk supply.  If formula is needed, hospital staff will work with you on a plan to help develop your milk supply.  Formula alters the natural growth of good bacteria in the  stomach.   Research has found that first time mothers who offer formula in the hospital have a shorter duration of breastfeeding.    How can I start to prepare?   Start by having a conversation with your medical provider.   Talk with your partner, family and friends.   Attend a prenatal class that includes breastfeeding preparation. Birth and breastfeeding classes are offered by [employer]. Visit [employer] for class information.   After your baby s birth, hospital staff and lactation consultants will help you and your baby get off to a great start with breastfeeding.      RESOURCES  [facility] Maternity Care:   https://[facility].org/locations/the-birthplace-at[facility] M[facility] Care:   https://[facility].org/locations/the-birthplace-[facility]    Scroll to the bottom of this page if the above link does not work      Breastfeeding:    OUTPATIENT LACTATION RESOURCES     -Schedule an appointment with a [facility] Nurse Midwives [facility] ALESSANDRA who is also a Lactation Consultant by calling [phone]. We see women for  breastfeeding visits at [facility] and [facility].     Chocolate Milk Club:  http://www.Tal MedicalvesselsmidwiferOrderMotion.com/chocolate-milk-club/  Join the Facebook group or join us for support on the first Monday of each month from 7 to 9 p.m.  , Dr. Wilda Barry, DNP, CNM, CNP, IBCLC, ICEA  Phone: [phone]  Fax: [phone]  Email: [email or web address]    R.O.S.E. = Reaching our Sisters Everywhere  Http://www.breastfeedingrose.org/    Black Women Do Breastfeed Blog  www.blackwomendobreastfeed.org    Club Mom breastfeeding support for Black mothers:  Contact Manisha Aceves  Phone: [phone]   Email:  [email or web address].     Ana Ro  Phone: [phone]   Email:  [email or web address]    Club Dad parent support for Black fathers:   Contact Delonte Ag   Phone: [phone]   Email:  [email or web address]    The ong Breastfeeding Coalition is a wonderful support for [facility] women who are breastfeeding.  They are best found on Facebook.      The Hmong Breastfeeding Coalition has produced a collection of video stories about breastfeeding in the [facility] community, produced by the Hmong Breastfeeding Coalition.  Most are in English, but one on handling breastmilk is in ong.  The video collection is in the middle of the page.    This page also has several other resources on ong breastfeeding.     https://mnbreastfeedingcoalition.org/communities/    WIC program application: Dashawn Luna   Https://www.youtube.com/watch?v=lQoWwPoyGYc    For information on Local WIC services call:  [phone]    You may qualify...  If you are pregnant, nursing, or have a child under age 5, we encourage you to Apply for WIC.    WIC Provides...  Nutrition tips and advice  Support for breastfeeding  Healthy foods like fresh fruits and vegetables, whole grain cereals, bread and tortillas, low fat milk, and baby foods  Caring and  supportive staff  Don't delay! We're here to help!  CALL TODAY FOR A WIC CLINIC NEAR YOU  9-014-KEO-9718 or 1-844.525.4380     New Parent Connection:   Cecille Nolasco, [address] [facility]  In-person meetings on  from 6 pm - 7:15 pm for parents of  to 9 months, at the same site.   All are free, drop-in, no registration required.    There are also free virtual meetings ongoing on :  11:30 am - 12:30 pm for parents of newborns to 3 months  4:15 pm to 5:15 pm for parents of 3 to 9-month olds  For joining info parents should call Kika Yuan at [phone]    -Baby Café  Find a Baby Café - Baby Café KnexxLocal (babycafeusa.org)   Search by State ([place]) to find the nearest cafe to you      [facility] Baby Café  Due to COVID-19, all Baby Café sessions are canceled until further notice. For lactation support, please contact one of our bilingual staff:  Alesha (IBCLC) [phone]  Ame (IBCLC/ Finnish) [phone]  Zoua (Hmong) [phone]  Dylan (Iranian) [phone]  Baby Café is a free, drop-in service offering breastfeeding/chestfeeding support. Come share tips and socialize with other pregnant, breastfeeding/chestfeeding families. Babies and siblings are welcome (no  available).  We offer:  Professionally trained lactation staff.  Resource books for lending.  Relaxed and fun atmosphere.  Refreshments.   More information  Ame Morin  [phone]  [email or web address]     -Attend a baby weigh in at [facility].  Lactation consultants are available to answer questions  [facility]:  1:00 - 2:00  [facility]:  1:00 - 2:00   www.[facility] HospitalGeneral Compressioner.Araca    -Attend one of the New Mama groups at [facility] in [facility].  [facility] also offers one-on-one in home and in office lactation consults.   www.[facility].com    -Attend a LeLeche League meeting.  Multiple groups in several locations throughout the [facility]. The  meetings are no-cost and always informative breastfeeding education session through [employer] La Leche League  Www.jose f.org/     Held at [facility] the second Thursday of each month at 7pm    Childbirth and Parenting Education:     Everyday Miracles:   https://www.everyday-miracles.org/    Free Video Series from [facility]: https://nursing.[facility]/academics/specialty-areas/nurse-midwifery/having-baby-prenatal-videos/having-baby-prenatal-and    Childbirth Education virtual (live) classes: www.AnyCloud/classes  The Birth Hour: https://M[employer]/online-childbirth-class/  BirthED: https://www.birthedmn.com/  BILLY parenting center: http://amHarper University HospitalFamilyLeaf/   (064) 684-WECG  [place]: (education, yoga & wellness) www.ViaCLIX  Enlightened Mama: www.EVIAGENICSenedmama.Talend   Childbirth collective: (Parent topic nights)  www.childbirthcollective.org/  Hypnobabies:  www.hypnobabiestVisible Light Solar Technologies.Talend/  Hypnobirthing:  Http://Farelogix/  Hypnobirthing virtual class: www.iVideosongs/hypnobirthing    Information about doulas:  Childbirth collective: http://www.childbirthcollective.org/  Doulas of [place] (DEBBIE):  www.debbie.org  [facility]  project: http://twincitiesdoulaproject.com/     Early Childhood and Family Education (ECFE):  ECFE offers parents hands-on learning experiences that will nourish a lifetime of teachable moments.  http://ecfe.info/ecfe-home/    APPS and Podcasts:   [employer] Nurture    Evidence Based Birth  The Birth Hour (for birth stories)   Birthful   Expectful   The Longest Shortest Time  PregnancyPodcast Lori Rust    Book Recommendations:   Jackie Karen's Birthing From Within--first few chapters include a new-age tone, you may prefer to skip it and keep going, because there is good stuff later.  This book recommendation covers emotional preparation, but does cover coping with pain, and use of both pharmacological and  "nonpharmacological methods.    Guide to Childbirth by Gloria Zelaya  Childbirth Without Fear by Nette Villalba Read    Dr. Costa' The Pregnancy Book and The Birth Book--the pregnancy book goes month-by month    The Birth Partner by Arelis Robbins    Womanly Art of Breastfeeding by La Leche League International   Bestfeeding by Marie Keller--great pictures    Mothering Your Nursing Toddler, by Angy Burgos.   Addresses dealing with so many of the challenging behaviors of a nursing toddler.  How Weaning Happens, by La Leche League.  Discusses weaning at all ages, from medically necessary weaning of an infant, all the way up to age 5 (or older), with why/why not, and strategies.  Very empowering book both for deciding to wean and deciding not to.    American College of Nurse-Midwives (ACNM) http://www.midwife.org/; look at the informational handouts at http://www.midwife.org/Share-With-Women     www.mymidwife.org    Mother to Baby (Medication and Herbal guidance in pregnancy): http://www.mothertobaby.org  Toll-Free Hotline: 876.375.9043  LactMed (Medication use while breastfeeding): http://toxnet.nlm.nih.gov/newtoxnet/lactmed.htm    Women's Health.gov:  http://www.womenshealth.gov/a-z-topics/index.html    American pregnancy association - http://americanpregnancy.org    Centering Pregnancy (group prenatal care option): http://centeringhealthcare.org    March of Dimes www.Centice.com     FDA - Nutrition  www.mypyramid.gov  Under \"For Consumers,\" click on \"pregnant and breastfeeding women.\"      Centers for Disease Control and Prevention (CDC) - Vaccines : http://www.cdc.gov/vaccines/       When researching information on the web, question the validity of websites.  The domains .gov, .edu and.org tend to be more reliable information.  If there are a lot of advertisements, be cautious of the information provided. Stay away from blogs and chat rooms please!     "

## 2023-12-20 ENCOUNTER — TELEPHONE (OUTPATIENT)
Dept: MIDWIFE SERVICES | Facility: CLINIC | Age: 23
End: 2023-12-20
Payer: COMMERCIAL

## 2023-12-20 DIAGNOSIS — N39.0 URINARY TRACT INFECTION WITHOUT HEMATURIA, SITE UNSPECIFIED: Primary | ICD-10-CM

## 2023-12-20 RX ORDER — NITROFURANTOIN 25; 75 MG/1; MG/1
100 CAPSULE ORAL 2 TIMES DAILY
Qty: 14 CAPSULE | Refills: 0 | Status: SHIPPED | OUTPATIENT
Start: 2023-12-20 | End: 2024-01-02

## 2023-12-20 NOTE — CONFIDENTIAL NOTE
[facility]    Phone Message    May a detailed message be left on voicemail: yes     Reason for Call: Requesting Results     Name/type of test: UA  Date of test: 12/18  Was test done at a location other than [facility] (Please fill in the location if not [facility])?: No    Returning call for results.    Action Taken: Message routed to:  Other: Women's    Travel Screening: Not Applicable

## 2023-12-20 NOTE — TELEPHONE ENCOUNTER
Telephone call to patient, reviewed UC results and recommendation for treatment. Discussed use of Macrobid BID for 7 days, will sent script to pharmacy on file. Reviewed increasing oral fluids. Reviewed NKDA. All questions answered.

## 2023-12-24 ENCOUNTER — NURSE TRIAGE (OUTPATIENT)
Dept: NURSING | Facility: CLINIC | Age: 23
End: 2023-12-24
Payer: COMMERCIAL

## 2023-12-25 NOTE — TELEPHONE ENCOUNTER
"OB Triage Call      Is patient's OB/Midwife with the [facility] or [facility]? [facility]- Proceed with triage     Reason for call: Vaginal Bleeding 7 weeks pregnant, light with wiping. No systemic symptoms.     Assessment: Vaginal Bleeding    Plan:Follow up with OB          Unknown    Estimated Date of Delivery: Data Unavailable        OB History    Para Term  AB Living   2 1 1 0 0 1   SAB IAB Ectopic Multiple Live Births   0 0 0 0 1      # Outcome Date GA Lbr Carlos/2nd Weight Sex Delivery Anes PTL Lv   2 Current            1 Term 23 40w5d 08:37 / 00:29 3.1 kg (6 lb 13.4 oz) F Vag-Spont EPI N JEREMY      Name: TAYLOR ARCOS-ANDERS      Apgar1: 9  Apgar5: 9       No results found for: \"GBS\"       Vin York RN 23 7:25 PM  [facility] Nurse Advisor  Reason for Disposition   MILD vaginal bleeding (i.e., less than 1 pad / hour; less than patient's usual menstrual bleeding; not just spotting)    Additional Information   Negative: Shock suspected (e.g., cold/pale/clammy skin, too weak to stand, low BP, rapid pulse)   Negative: Difficult to awaken or acting confused (e.g., disoriented, slurred speech)   Negative: Passed out (i.e., lost consciousness, collapsed and was not responding)   Negative: Sounds like a life-threatening emergency to the triager   Negative: SEVERE abdominal pain   Negative: SEVERE vaginal bleeding (e.g., soaking 2 pads per hour or large blood clots and present 2 or more hours)   Negative: SEVERE dizziness (e.g., unable to stand, requires support to walk, feels like passing out)   Negative: [1] MODERATE vaginal bleeding (e.g., soaking 1 pad per hour; clots) AND [2] present > 6 hours   Negative: [1] MODERATE vaginal bleeding (e.g., soaking 1 pad per hour; clots) AND [2] pregnant > 12 weeks   Negative: Passed tissue (e.g., gray-white)   Negative: Shoulder pain   Negative: Pale skin (pallor) of new-onset or worsening   Negative: Patient sounds very sick or weak to the " "triager   Negative: [1] Constant abdominal pain AND [2] present > 2 hours   Negative: Fever 100.4 F (38.0 C) or higher   Negative: [1] Intermittent lower abdominal pain (e.g., cramping) AND [2] present > 24 hours   Negative: Prior history of \"ectopic pregnancy\" or previous tubal surgery (e.g., tubal ligation)   Negative: Pain or burning with passing urine (urination)   Negative: Using heparin (e.g., Lovenox) or other strong blood thinner, or known bleeding disorder (e.g., thrombocytopenia)   Negative: MODERATE vaginal bleeding (e.g., soaking 1 pad per hour; clots)   Negative: Has IUD    Protocols used: Pregnancy - Vaginal Bleeding Less Than 20 Weeks EGA-A-    "

## 2023-12-31 ENCOUNTER — HOSPITAL ENCOUNTER (EMERGENCY)
Facility: HOSPITAL | Age: 23
Discharge: HOME | End: 2023-12-31
Attending: EMERGENCY MEDICINE | Admitting: EMERGENCY MEDICINE
Payer: COMMERCIAL

## 2023-12-31 ENCOUNTER — APPOINTMENT (OUTPATIENT)
Dept: ULTRASOUND IMAGING | Facility: HOSPITAL | Age: 23
End: 2023-12-31
Attending: EMERGENCY MEDICINE
Payer: COMMERCIAL

## 2023-12-31 VITALS
DIASTOLIC BLOOD PRESSURE: 57 MMHG | SYSTOLIC BLOOD PRESSURE: 105 MMHG | TEMPERATURE: 98.6 F | OXYGEN SATURATION: 100 % | HEIGHT: 60 IN | HEART RATE: 87 BPM | WEIGHT: 219.4 LBS | RESPIRATION RATE: 16 BRPM | BODY MASS INDEX: 43.07 KG/M2

## 2023-12-31 DIAGNOSIS — O20.0 THREATENED ABORTION: ICD-10-CM

## 2023-12-31 DIAGNOSIS — O03.9 SAB (SPONTANEOUS ABORTION): ICD-10-CM

## 2023-12-31 DIAGNOSIS — N93.9 VAGINAL BLEEDING: ICD-10-CM

## 2023-12-31 LAB
ABO/RH(D): NORMAL
ANION GAP SERPL CALCULATED.3IONS-SCNC: 13 MMOL/L (ref 7–15)
ANTIBODY SCREEN: NEGATIVE
BUN SERPL-MCNC: 13.9 MG/DL (ref 6–20)
CALCIUM SERPL-MCNC: 9.7 MG/DL (ref 8.6–10)
CHLORIDE SERPL-SCNC: 102 MMOL/L (ref 98–107)
CREAT SERPL-MCNC: 0.63 MG/DL (ref 0.51–0.95)
DEPRECATED HCO3 PLAS-SCNC: 25 MMOL/L (ref 22–29)
EGFRCR SERPLBLD CKD-EPI 2021: >90 ML/MIN/1.73M2
ERYTHROCYTE [DISTWIDTH] IN BLOOD BY AUTOMATED COUNT: 14.3 % (ref 10–15)
GLUCOSE SERPL-MCNC: 85 MG/DL (ref 70–99)
HCG INTACT+B SERPL-ACNC: 4216 MIU/ML
HCT VFR BLD AUTO: 41.8 % (ref 35–47)
HGB BLD-MCNC: 13.7 G/DL (ref 11.7–15.7)
HOLD SPECIMEN: NORMAL
HOLD SPECIMEN: NORMAL
MCH RBC QN AUTO: 26.2 PG (ref 26.5–33)
MCHC RBC AUTO-ENTMCNC: 32.8 G/DL (ref 31.5–36.5)
MCV RBC AUTO: 80 FL (ref 78–100)
PLATELET # BLD AUTO: 281 10E3/UL (ref 150–450)
POTASSIUM SERPL-SCNC: 3.8 MMOL/L (ref 3.4–5.3)
RBC # BLD AUTO: 5.22 10E6/UL (ref 3.8–5.2)
SODIUM SERPL-SCNC: 140 MMOL/L (ref 135–145)
SPECIMEN EXPIRATION DATE: NORMAL
WBC # BLD AUTO: 9.8 10E3/UL (ref 4–11)

## 2023-12-31 PROCEDURE — 99285 EMERGENCY DEPT VISIT HI MDM: CPT | Mod: 25

## 2023-12-31 PROCEDURE — 96374 THER/PROPH/DIAG INJ IV PUSH: CPT

## 2023-12-31 PROCEDURE — 86900 BLOOD TYPING SEROLOGIC ABO: CPT | Performed by: EMERGENCY MEDICINE

## 2023-12-31 PROCEDURE — 36415 COLL VENOUS BLD VENIPUNCTURE: CPT | Performed by: EMERGENCY MEDICINE

## 2023-12-31 PROCEDURE — 85027 COMPLETE CBC AUTOMATED: CPT | Performed by: EMERGENCY MEDICINE

## 2023-12-31 PROCEDURE — 250N000011 HC RX IP 250 OP 636: Performed by: EMERGENCY MEDICINE

## 2023-12-31 PROCEDURE — 76801 OB US < 14 WKS SINGLE FETUS: CPT

## 2023-12-31 PROCEDURE — 82374 ASSAY BLOOD CARBON DIOXIDE: CPT | Performed by: EMERGENCY MEDICINE

## 2023-12-31 PROCEDURE — 84702 CHORIONIC GONADOTROPIN TEST: CPT | Performed by: EMERGENCY MEDICINE

## 2023-12-31 RX ORDER — ACETAMINOPHEN 325 MG/1
975 TABLET ORAL ONCE
Status: COMPLETED | OUTPATIENT
Start: 2023-12-31 | End: 2023-12-31

## 2023-12-31 RX ORDER — MORPHINE SULFATE 2 MG/ML
2 INJECTION, SOLUTION INTRAMUSCULAR; INTRAVENOUS ONCE
Status: COMPLETED | OUTPATIENT
Start: 2023-12-31 | End: 2023-12-31

## 2023-12-31 RX ADMIN — MORPHINE SULFATE 2 MG: 2 INJECTION, SOLUTION INTRAMUSCULAR; INTRAVENOUS at 20:17

## 2023-12-31 ASSESSMENT — ACTIVITIES OF DAILY LIVING (ADL)
ADLS_ACUITY_SCORE: 33
ADLS_ACUITY_SCORE: 35

## 2024-01-01 ENCOUNTER — NURSE TRIAGE (OUTPATIENT)
Dept: NURSING | Facility: CLINIC | Age: 24
End: 2024-01-01
Payer: COMMERCIAL

## 2024-01-01 NOTE — DISCHARGE INSTRUCTIONS
You were seen in the Emergency Department today for cramping and vaginal bleeding.    We discussed, your ultrasound today was abnormal see will need to have this repeated and your labs rechecked on Tuesday.  You should call the midwives in the morning that day to schedule follow-up.     In the meantime, recommend that you use Tylenol for pain since this is safe in pregnancy.      Please return to the ER if you experience heavier bleeding, uncontrolled pain, fever, lightheadedness, and/or for any other new or concerning symptoms, otherwise please follow up with the midwives for recheck.     Below is some information you might find useful.     Thank you for choosing [facility]. It was a pleasure taking care of you today!  - Dr. Kacie Farmer

## 2024-01-01 NOTE — ED PROVIDER NOTES
EMERGENCY DEPARTMENT ENCOUNTER      NAME: Amanda Miller  YOB: 2000  MRN: [id number]    FINAL IMPRESSION  1. Vaginal bleeding    2. Threatened         MEDICAL DECISION MAKING   Pertinent Labs & Imaging studies reviewed. (See chart for details)    Amanda Miller is a 23 year old female () who presents for evaluation of vaginal bleeding and lower abdominal cramping.  Patient estimates she is about 8 weeks pregnant and reports that she started to have spotting last week.  She spoke with someone at OB/GYN office and was told to continue monitoring.  She subsequently traveled to [place] but had increasing bleeding while there and since getting back.  Today, she also started to experience worsening lower abdominal and back cramping, prompting her to seek evaluation.  No other new complaints.    I considered a broad differential including but not limited to threatened , spontaneous , missed , ectopic pregnancy, placenta previa, placental abruption. Discussed options for workup and management with patient and agreed on plan for labs and US. Will manage symptoms with IV analgesic/antiemetic.     Patient Rh+ so will not require RhoGAM.  Hemoglobin stable at 13.7.  No leukocytosis.  hCG 4216.  BMP reassuring. No evidence of NIEVES, acidosis, or significant electrolyte derangement.  I spoke with radiologist regarding ultrasound.  This revealed an irregular fluid-filled structure in the lower uterine segment most likely representing abnormal gestational sac.  No fetal pole identified.  Cannot rule out ectopic pregnancy.      I rechecked the patient and reviewed these results.  She did not feel Tylenol would take care of her pain so did receive a dose of morphine with some improvement.  We discussed options for next steps in workup and management.  Patient did request that I speak with OB/GYN today and I discussed the case with Dr. Alvarado who agreed with plan for discharge and  follow-up in clinic for repeat hCG and ultrasound.  I updated patient with these recommendations and she was comfortable with plan.  She will call the clinic on Tuesday to get in for recheck.    I expressed my sincere sympathies to she and her significant other. We reviewed warning signs and symptoms, and I instructed Ms. Miller to return to the emergency department immediately if she develops any new or worsening symptoms. I provided additional verbal discharge instructions. Ms. Miller expressed understanding and agreement with this plan of care, her questions were answered, and she was discharged in stable condition.          Medical Decision Making    History:  Supplemental history from: Documented in chart, if applicable  External Record(s) reviewed: Other: 12/18/2023 [facility] midwifery [place], 12/24/2023 [facility] nurse advisors    Work Up:  Chart documentation includes differential considered and any EKGs or imaging independently interpreted by provider, where specified.  In additional to work up documented, I considered the following work up: Documented in chart, if applicable.    External consultation:  Discussion of management with another provider: OB-Gyn    Complicating factors:  Care impacted by chronic illness: N/A  Care affected by social determinants of health: N/A    Disposition considerations: Discharge. No recommendations on prescription strength medication(s). I considered admission, but discharged the patient after share decision making conversation.      ED COURSE  7:25 PM I met with the patient and performed my initial physical exam.  We discussed the plan for the workup in the emergency department.  8:16 PM Patient rechecked. She is having pain and wants tylenol.  9:58 PM Spoke with Dr. Alvarado, OB/GYN.  10:09 PM I rechecked on the patient and spoke with her about the plan going forward.    MEDICATIONS GIVEN IN THE ED  Medications   acetaminophen (TYLENOL) tablet 975 mg (975 mg  "Oral Not Given 12/31/23 2015)   morphine (PF) injection 2 mg (2 mg Intravenous $Given 12/31/23 2017)       NEW PRESCRIPTIONS STARTED AT TODAY'S VISIT  Discharge Medication List as of 12/31/2023 10:24 PM             =================================================================    Chief Complaint   Patient presents with    Vaginal Bleeding - Pregnant         HPI:    Patient information was obtained from: Patient    Use of : N/A    Amanda Miller is a 23 year old female who presents with vaginal bleeding.    Per chart review: Patient called [facility] nurse advises on 12/24/2023 for evaluation of vaginal bleeding.  Patient states she was 7 weeks pregnant and is having spotting.  Plan was for patient to follow-up with OB.    Last week, patient endorses an onset of vaginal bleeding/spotting.  She notes that she is 8 weeks pregnant and this will be her second child.  She notes that upon the onset of symptoms, she called her clinic who said this could be normal.  With this information, patient reports going on a trip to [place] for the past few days.  However, for the past few days she reports the vaginal bleeding increasing in output.  Today, she also endorses having some lower abdominal pain and back cramping that she describes as feeling \"as if she is going in labor.\"  She notes she had a 3-hour flight back from [place] today and will on the flight she soaked an entire pad.    Denies taking any pain medications prior to coming here.  Denies any fever, nausea, vomiting, diarrhea, or urinary symptoms.  Denies any other blood thinner.  Denies any recent falls, injury, or related traumas.    Otherwise in normal state of health. No further concerns at this time.       RELEVANT HISTORY, MEDICATIONS, & ALLERGIES   Past medical history, surgical history, family history, medications, and allergies reviewed and pertinent noted in HPI.    REVIEW OF SYSTEMS:  A complete review of systems was performed " with pertinent positives and negatives noted in the HPI. All other systems negative.     PHYSICAL EXAM:    Vitals: /57   Pulse 87   Temp 98.6  F (37  C) (Oral)   Resp 16   Ht 1.524 m (5')   Wt 99.5 kg (219 lb 6.4 oz)   LMP 10/30/2023 (Exact Date)   SpO2 100%   BMI 42.85 kg/m     General: Alert and interactive, comfortable appearing.  HENT: Atraumatic. Full AROM of neck. Conjunctiva clear.   Cardiovascular: Regular rate and rhythm.   Chest/Pulmonary: Normal work of breathing. Speaking in complete sentences. Lungs CTAB. No chest wall tenderness or deformities.  Abdomen: Soft, nondistended. Mild TTP to suprapubic area without guarding or rebound.  Extremities: Normal AROM of all major joints.  Skin: Warm and dry. Normal skin color.   Neuro: Speech clear. CNs grossly intact. Moves all extremities spontaneously.   Psych: Normal affect/mood, cooperative, memory appropriate.      LAB  Labs Ordered and Resulted from Time of ED Arrival to Time of ED Departure   HCG QUANTITATIVE PREGNANCY - Abnormal       Result Value    hCG Quantitative 4,216 (*)    CBC WITH PLATELETS - Abnormal    WBC Count 9.8      RBC Count 5.22 (*)     Hemoglobin 13.7      Hematocrit 41.8      MCV 80      MCH 26.2 (*)     MCHC 32.8      RDW 14.3      Platelet Count 281     BASIC METABOLIC PANEL - Normal    Sodium 140      Potassium 3.8      Chloride 102      Carbon Dioxide (CO2) 25      Anion Gap 13      Urea Nitrogen 13.9      Creatinine 0.63      GFR Estimate >90      Calcium 9.7      Glucose 85     TYPE AND SCREEN, ADULT    ABO/RH(D) O POS      Antibody Screen Negative      SPECIMEN EXPIRATION DATE [id number]     ABO/RH TYPE AND SCREEN       RADIOLOGY  OB  US 1st trimester w transvag   Final Result   IMPRESSION:    1.  Irregular fluid-filled structure in the lower uterine segment/cervix, most likely representing abnormal gestational sac. No fetal pole identified.      2.  Since intrauterine gestation is not definitively confirmed,  ectopic pregnancy is not excluded. Consider serial beta hCG and/or follow-up ultrasound as clinically warranted.                  I, Merna Chen, am serving as a scribe to document services personally performed by Dr. Kacie Farmer based on my observation and the provider's statements to me. I, Kacie Farmer MD attest that Merna Chen is acting in a scribe capacity, has observed my performance of the services and has documented them in accordance with my direction.    Kacie Farmer M.D.  Emergency Medicine  [facility] EMERGENCY DEPARTMENT  [address] [place] [id number]  [phone]  Dept: [phone]       Kacie Farmer MD  01/01/24 0149

## 2024-01-01 NOTE — ED NOTES
"Patient reports increased cramping pain. Declined tylenol twice, stated \"I don't think it is going to work\". Then requested stronger pain medication similar to the one she received after giving birth. Patient unsure of the name of the medication. Dr. Farmer updated.  "

## 2024-01-01 NOTE — ED TRIAGE NOTES
Pt ambulatory to triage c/o vaginal bleeding x 1 wk. Pt states it started as light spotting while they were traveling in [place]. They just got off a return flight from [place] and drove straight here. Pt reports increased bleeding the last 2 days, with heavy bleeding and clots today. Pt changing a peripad about once every 1-2 hrs. Pt approx 8 wks pregnant and has not yet had a prenatal visit.   Pt had normal vaginal delivery in April 2023.

## 2024-01-02 ENCOUNTER — LAB (OUTPATIENT)
Dept: LAB | Facility: CLINIC | Age: 24
End: 2024-01-02
Attending: MIDWIFE
Payer: COMMERCIAL

## 2024-01-02 ENCOUNTER — OFFICE VISIT (OUTPATIENT)
Dept: MIDWIFE SERVICES | Facility: CLINIC | Age: 24
End: 2024-01-02
Payer: COMMERCIAL

## 2024-01-02 ENCOUNTER — TELEPHONE (OUTPATIENT)
Dept: NURSING | Facility: CLINIC | Age: 24
End: 2024-01-02

## 2024-01-02 VITALS
WEIGHT: 220.7 LBS | DIASTOLIC BLOOD PRESSURE: 60 MMHG | HEIGHT: 60 IN | HEART RATE: 88 BPM | BODY MASS INDEX: 43.33 KG/M2 | SYSTOLIC BLOOD PRESSURE: 90 MMHG

## 2024-01-02 DIAGNOSIS — O03.9 SAB (SPONTANEOUS ABORTION): ICD-10-CM

## 2024-01-02 DIAGNOSIS — O03.9 SAB (SPONTANEOUS ABORTION): Primary | ICD-10-CM

## 2024-01-02 PROBLEM — O99.820 GBS (GROUP B STREPTOCOCCUS CARRIER), +RV CULTURE, CURRENTLY PREGNANT: Status: RESOLVED | Noted: 2023-04-20 | Resolved: 2024-01-02

## 2024-01-02 PROBLEM — Z91.89 AT RISK FOR COMPLICATION OF PREGNANCY: Status: RESOLVED | Noted: 2022-09-24 | Resolved: 2024-01-02

## 2024-01-02 PROBLEM — Z34.03 ENCOUNTER FOR SUPERVISION OF NORMAL FIRST PREGNANCY IN THIRD TRIMESTER: Status: RESOLVED | Noted: 2022-09-23 | Resolved: 2024-01-02

## 2024-01-02 PROBLEM — Z13.79 GENETIC SCREENING: Status: RESOLVED | Noted: 2022-10-21 | Resolved: 2024-01-02

## 2024-01-02 PROBLEM — O61.0 FAILED MEDICAL INDUCTION OF LABOR: Status: RESOLVED | Noted: 2023-04-22 | Resolved: 2024-01-02

## 2024-01-02 LAB
HCG INTACT+B SERPL-ACNC: 809 MIU/ML
HCG SERPL QL: POSITIVE

## 2024-01-02 PROCEDURE — 36415 COLL VENOUS BLD VENIPUNCTURE: CPT

## 2024-01-02 PROCEDURE — 99214 OFFICE O/P EST MOD 30 MIN: CPT | Performed by: MIDWIFE

## 2024-01-02 PROCEDURE — 84702 CHORIONIC GONADOTROPIN TEST: CPT

## 2024-01-02 PROCEDURE — 84703 CHORIONIC GONADOTROPIN ASSAY: CPT

## 2024-01-02 NOTE — PROGRESS NOTES
Assessment:         Amanda Miller is a 23 year old female here for follow up after diagnosis of (O03.9) SAB (spontaneous )  (primary encounter diagnosis) on 23 in the ER.  Comment: needs repeat Quant and follow up US  Plan: HCG quantitative pregnancy, HCG qualitative,         Blood ([id number]), US OB < 14 Weeks Single        Plan:    Quant Bhcg today  Repeat ultrasound, follow up 23  Discussed preconception planning  Written info [employer] given    Subjective:     Amanda Miller is a 23 year old female. Amanda reports bleeding in pregnancy since 1.5 weeks, she reports passing larger clot and tissue last night then bleeding decreased in amount. She is not in acute distress. Ectopic risks: note from early ultrasound : Since intrauterine gestation is not definitively confirmed, ectopic pregnancy is not excluded.     Patient's last menstrual period was 10/30/2023 (exact date).  Cycle length: regular.  Pregnancy testing: UPT +, Quant on  4216.  Pregnancy imaging: see imaging tab, ultrasound completed 23.  Blood type:  Lab Results   Component Value Date    ABORH O POS 2023     Other lab results:   Results for orders placed or performed in visit on 24   HCG qualitative, Blood ([id number])     Status: Abnormal   Result Value Ref Range    hCG Serum Qualitative Positive (A) Negative       The following portions of the patient's history were reviewed and updated as appropriate: allergies, current medications, and problem list    Review of Systems  General:  Denies problem  Eyes: Denies problem  Ears/Nose/Throat: Denies problem  Cardiovascular: Denies problem  Respiratory:  Denies problem  Gastrointestinal:  Denies problem, Genitourinary: Denies problem  Musculoskeletal:  Denies problem  Skin: Denies problem  Neurologic: Denies problem  Psychiatric: Denies problem  Endocrine: Denies problem  Heme/Lymphatic: Denies problem   Allergic/Immunologic: Denies problem     Objective:         Vitals:    01/02/24 1556   BP: 90/60   Pulse: 88   Weight: 100.1 kg (220 lb 11.2 oz)   Height: 1.524 m (5')       Physical Exam:  General Appearance: Alert, cooperative, no distress, appears stated age     30minutes on the date of the encounter doing chart review, review of test results, interpretation of tests, patient visit, and documentation

## 2024-01-02 NOTE — TELEPHONE ENCOUNTER
Lab calling as pregnancy test was resulted as negative, but that was an error and it was positive.  They have to call via protocol.  They are requesting to speak with the ordering provider.      Advised that will transfer call to answering service for midwife group.     Cordelia Jones RN on 1/2/2024 at 5:20 PM

## 2024-01-02 NOTE — TELEPHONE ENCOUNTER
"Pt reports she was eight weeks along and seen in ER yesterday for possible miscarriage. Ultrasound showed no viable pregnancy. \"just passed a golf ball size clot, don't know what to do\". Pt reports feeling \"ok\" otherwise. Pt reports going through one overnight pad every hour to hour and a half. Pt denies abdominal pain, fever, weakness or dizziness.     Writer advised pt per protocol ER visit recommended.     Pt verbalizes understanding of advice, states she plans to follow up with ob/gyn tomorrow instead. She agrees to return to the ER she continues to pass large clots or feel worse.       Reason for Disposition   SEVERE vaginal bleeding (e.g., soaking 2 pads per hour or large blood clots and present 2 or more hours)    Additional Information   Negative: Shock suspected (e.g., cold/pale/clammy skin, too weak to stand, low BP, rapid pulse)   Negative: Difficult to awaken or acting confused (e.g., disoriented, slurred speech)   Negative: Passed out (i.e., lost consciousness, collapsed and was not responding)   Negative: Sounds like a life-threatening emergency to the triager   Negative: [1] Threatened miscarriage (threatened ) suspected AND [2] has not been examined by a doctor (or NP/PA)   Negative: [1] Abdomen pain is main symptom and [2] NO vaginal bleeding in past 24 hours   Negative: [1] Vaginal bleeding is main symptom and [2] more than 4 weeks since medical visit   Negative: Not pregnant or pregnancy status unknown    Protocols used:  - Threatened Miscarriage Follow-up Call-[facility]    "

## 2024-03-25 ENCOUNTER — TRANSFERRED RECORDS (OUTPATIENT)
Dept: HEALTH INFORMATION MANAGEMENT | Facility: CLINIC | Age: 24
End: 2024-03-25

## 2024-03-26 LAB
HEPATITIS B SURFACE ANTIGEN (EXTERNAL): NONREACTIVE
HIV1+2 AB SERPL QL IA: NONREACTIVE
RUBELLA ANTIBODY IGG (EXTERNAL): NORMAL
TREPONEMA PALLIDUM ANTIBODY (EXTERNAL): NONREACTIVE

## 2024-04-15 ENCOUNTER — TRANSFERRED RECORDS (OUTPATIENT)
Dept: HEALTH INFORMATION MANAGEMENT | Facility: CLINIC | Age: 24
End: 2024-04-15
Payer: COMMERCIAL

## 2024-04-16 ENCOUNTER — TRANSCRIBE ORDERS (OUTPATIENT)
Dept: MATERNAL FETAL MEDICINE | Facility: HOSPITAL | Age: 24
End: 2024-04-16
Payer: COMMERCIAL

## 2024-04-16 ENCOUNTER — MEDICAL CORRESPONDENCE (OUTPATIENT)
Dept: HEALTH INFORMATION MANAGEMENT | Facility: CLINIC | Age: 24
End: 2024-04-16
Payer: COMMERCIAL

## 2024-04-16 DIAGNOSIS — O26.90 PREGNANCY RELATED CONDITION: Primary | ICD-10-CM

## 2024-04-16 DIAGNOSIS — O26.90 PREGNANCY RELATED CONDITION, ANTEPARTUM: Primary | ICD-10-CM

## 2024-05-24 ENCOUNTER — PRE VISIT (OUTPATIENT)
Dept: MATERNAL FETAL MEDICINE | Facility: HOSPITAL | Age: 24
End: 2024-05-24
Payer: COMMERCIAL

## 2024-05-29 ENCOUNTER — ANCILLARY PROCEDURE (OUTPATIENT)
Dept: ULTRASOUND IMAGING | Facility: HOSPITAL | Age: 24
End: 2024-05-29
Attending: STUDENT IN AN ORGANIZED HEALTH CARE EDUCATION/TRAINING PROGRAM
Payer: COMMERCIAL

## 2024-05-29 ENCOUNTER — OFFICE VISIT (OUTPATIENT)
Dept: MATERNAL FETAL MEDICINE | Facility: HOSPITAL | Age: 24
End: 2024-05-29
Attending: STUDENT IN AN ORGANIZED HEALTH CARE EDUCATION/TRAINING PROGRAM
Payer: COMMERCIAL

## 2024-05-29 DIAGNOSIS — Z36.2 ENCOUNTER FOR FOLLOW-UP ULTRASOUND OF FETAL ANATOMY: ICD-10-CM

## 2024-05-29 DIAGNOSIS — O99.210 OBESITY IN PREGNANCY, ANTEPARTUM: Primary | ICD-10-CM

## 2024-05-29 DIAGNOSIS — O26.90 PREGNANCY RELATED CONDITION, ANTEPARTUM: ICD-10-CM

## 2024-05-29 PROCEDURE — 76811 OB US DETAILED SNGL FETUS: CPT

## 2024-05-29 PROCEDURE — 76811 OB US DETAILED SNGL FETUS: CPT | Mod: 26 | Performed by: STUDENT IN AN ORGANIZED HEALTH CARE EDUCATION/TRAINING PROGRAM

## 2024-05-29 PROCEDURE — 99213 OFFICE O/P EST LOW 20 MIN: CPT | Mod: 25 | Performed by: STUDENT IN AN ORGANIZED HEALTH CARE EDUCATION/TRAINING PROGRAM

## 2024-05-29 NOTE — PROGRESS NOTES
"Please see \"Imaging\" tab under \"Chart Review\" for details of today's visit.    Payton Cruz    "

## 2024-05-29 NOTE — NURSING NOTE
Amanda Miller is a  at 20w0d who presents to [facility] for scheduled L2.  SBAR given to Dr. BON Cruz, see note in Epic.

## 2024-06-26 ENCOUNTER — HOSPITAL ENCOUNTER (OUTPATIENT)
Dept: ULTRASOUND IMAGING | Facility: CLINIC | Age: 24
Discharge: HOME | End: 2024-06-26
Attending: STUDENT IN AN ORGANIZED HEALTH CARE EDUCATION/TRAINING PROGRAM
Payer: COMMERCIAL

## 2024-06-26 ENCOUNTER — OFFICE VISIT (OUTPATIENT)
Dept: MATERNAL FETAL MEDICINE | Facility: CLINIC | Age: 24
End: 2024-06-26
Attending: STUDENT IN AN ORGANIZED HEALTH CARE EDUCATION/TRAINING PROGRAM
Payer: COMMERCIAL

## 2024-06-26 DIAGNOSIS — E66.01 OBESITY, CLASS III, BMI 40-49.9 (MORBID OBESITY) (H): Primary | ICD-10-CM

## 2024-06-26 DIAGNOSIS — Z36.2 ENCOUNTER FOR FOLLOW-UP ULTRASOUND OF FETAL ANATOMY: ICD-10-CM

## 2024-06-26 DIAGNOSIS — O99.210 OBESITY IN PREGNANCY, ANTEPARTUM: ICD-10-CM

## 2024-06-26 PROCEDURE — 76816 OB US FOLLOW-UP PER FETUS: CPT

## 2024-06-26 PROCEDURE — 99213 OFFICE O/P EST LOW 20 MIN: CPT | Mod: 25 | Performed by: STUDENT IN AN ORGANIZED HEALTH CARE EDUCATION/TRAINING PROGRAM

## 2024-06-26 PROCEDURE — G0463 HOSPITAL OUTPT CLINIC VISIT: HCPCS | Performed by: STUDENT IN AN ORGANIZED HEALTH CARE EDUCATION/TRAINING PROGRAM

## 2024-06-26 PROCEDURE — 76816 OB US FOLLOW-UP PER FETUS: CPT | Mod: 26 | Performed by: STUDENT IN AN ORGANIZED HEALTH CARE EDUCATION/TRAINING PROGRAM

## 2024-07-27 ENCOUNTER — HEALTH MAINTENANCE LETTER (OUTPATIENT)
Age: 24
End: 2024-07-27

## 2024-08-14 ENCOUNTER — APPOINTMENT (OUTPATIENT)
Dept: MRI IMAGING | Facility: HOSPITAL | Age: 24
End: 2024-08-14
Attending: STUDENT IN AN ORGANIZED HEALTH CARE EDUCATION/TRAINING PROGRAM
Payer: COMMERCIAL

## 2024-08-14 ENCOUNTER — HOSPITAL ENCOUNTER (OUTPATIENT)
Facility: HOSPITAL | Age: 24
Discharge: HOME | End: 2024-08-14
Attending: REGISTERED NURSE | Admitting: REGISTERED NURSE
Payer: COMMERCIAL

## 2024-08-14 VITALS
HEART RATE: 80 BPM | WEIGHT: 207 LBS | TEMPERATURE: 98.1 F | OXYGEN SATURATION: 98 % | DIASTOLIC BLOOD PRESSURE: 55 MMHG | BODY MASS INDEX: 40.43 KG/M2 | SYSTOLIC BLOOD PRESSURE: 100 MMHG | RESPIRATION RATE: 18 BRPM

## 2024-08-14 DIAGNOSIS — R10.13 ABDOMINAL PAIN, EPIGASTRIC: ICD-10-CM

## 2024-08-14 PROBLEM — Z36.89 ENCOUNTER FOR TRIAGE IN PREGNANT PATIENT: Status: ACTIVE | Noted: 2024-08-14

## 2024-08-14 LAB
ALBUMIN SERPL BCG-MCNC: 3.5 G/DL (ref 3.5–5.2)
ALP SERPL-CCNC: 166 U/L (ref 40–150)
ALT SERPL W P-5'-P-CCNC: 49 U/L (ref 0–50)
ANION GAP SERPL CALCULATED.3IONS-SCNC: 13 MMOL/L (ref 7–15)
AST SERPL W P-5'-P-CCNC: 45 U/L (ref 0–45)
BASOPHILS # BLD AUTO: 0 10E3/UL (ref 0–0.2)
BASOPHILS NFR BLD AUTO: 0 %
BILIRUB DIRECT SERPL-MCNC: <0.2 MG/DL (ref 0–0.3)
BILIRUB SERPL-MCNC: 0.4 MG/DL
BUN SERPL-MCNC: 9.5 MG/DL (ref 6–20)
CALCIUM SERPL-MCNC: 9 MG/DL (ref 8.8–10.4)
CHLORIDE SERPL-SCNC: 101 MMOL/L (ref 98–107)
CREAT SERPL-MCNC: 0.64 MG/DL (ref 0.51–0.95)
EGFRCR SERPLBLD CKD-EPI 2021: >90 ML/MIN/1.73M2
EOSINOPHIL # BLD AUTO: 0 10E3/UL (ref 0–0.7)
EOSINOPHIL NFR BLD AUTO: 0 %
ERYTHROCYTE [DISTWIDTH] IN BLOOD BY AUTOMATED COUNT: 14.2 % (ref 10–15)
GLUCOSE SERPL-MCNC: 128 MG/DL (ref 70–99)
HCO3 SERPL-SCNC: 22 MMOL/L (ref 22–29)
HCT VFR BLD AUTO: 37.5 % (ref 35–47)
HGB BLD-MCNC: 12.4 G/DL (ref 11.7–15.7)
IMM GRANULOCYTES # BLD: 0.1 10E3/UL
IMM GRANULOCYTES NFR BLD: 1 %
LIPASE SERPL-CCNC: 62 U/L (ref 13–60)
LYMPHOCYTES # BLD AUTO: 0.9 10E3/UL (ref 0.8–5.3)
LYMPHOCYTES NFR BLD AUTO: 7 %
MCH RBC QN AUTO: 26.8 PG (ref 26.5–33)
MCHC RBC AUTO-ENTMCNC: 33.1 G/DL (ref 31.5–36.5)
MCV RBC AUTO: 81 FL (ref 78–100)
MONOCYTES # BLD AUTO: 0.7 10E3/UL (ref 0–1.3)
MONOCYTES NFR BLD AUTO: 5 %
NEUTROPHILS # BLD AUTO: 11.6 10E3/UL (ref 1.6–8.3)
NEUTROPHILS NFR BLD AUTO: 87 %
NRBC # BLD AUTO: 0 10E3/UL
NRBC BLD AUTO-RTO: 0 /100
PLATELET # BLD AUTO: 237 10E3/UL (ref 150–450)
POTASSIUM SERPL-SCNC: 4.3 MMOL/L (ref 3.4–5.3)
PROT SERPL-MCNC: 6.8 G/DL (ref 6.4–8.3)
RBC # BLD AUTO: 4.63 10E6/UL (ref 3.8–5.2)
SODIUM SERPL-SCNC: 136 MMOL/L (ref 135–145)
WBC # BLD AUTO: 13.3 10E3/UL (ref 4–11)

## 2024-08-14 PROCEDURE — 96374 THER/PROPH/DIAG INJ IV PUSH: CPT

## 2024-08-14 PROCEDURE — 82248 BILIRUBIN DIRECT: CPT | Performed by: STUDENT IN AN ORGANIZED HEALTH CARE EDUCATION/TRAINING PROGRAM

## 2024-08-14 PROCEDURE — 96376 TX/PRO/DX INJ SAME DRUG ADON: CPT

## 2024-08-14 PROCEDURE — 96375 TX/PRO/DX INJ NEW DRUG ADDON: CPT

## 2024-08-14 PROCEDURE — 59025 FETAL NON-STRESS TEST: CPT | Mod: 76

## 2024-08-14 PROCEDURE — 83690 ASSAY OF LIPASE: CPT | Performed by: STUDENT IN AN ORGANIZED HEALTH CARE EDUCATION/TRAINING PROGRAM

## 2024-08-14 PROCEDURE — 99285 EMERGENCY DEPT VISIT HI MDM: CPT

## 2024-08-14 PROCEDURE — 36415 COLL VENOUS BLD VENIPUNCTURE: CPT | Performed by: STUDENT IN AN ORGANIZED HEALTH CARE EDUCATION/TRAINING PROGRAM

## 2024-08-14 PROCEDURE — 250N000013 HC RX MED GY IP 250 OP 250 PS 637: Performed by: REGISTERED NURSE

## 2024-08-14 PROCEDURE — 96361 HYDRATE IV INFUSION ADD-ON: CPT

## 2024-08-14 PROCEDURE — 85025 COMPLETE CBC W/AUTO DIFF WBC: CPT | Performed by: STUDENT IN AN ORGANIZED HEALTH CARE EDUCATION/TRAINING PROGRAM

## 2024-08-14 PROCEDURE — 258N000003 HC RX IP 258 OP 636: Performed by: STUDENT IN AN ORGANIZED HEALTH CARE EDUCATION/TRAINING PROGRAM

## 2024-08-14 PROCEDURE — 99285 EMERGENCY DEPT VISIT HI MDM: CPT | Mod: 25

## 2024-08-14 PROCEDURE — 250N000011 HC RX IP 250 OP 636: Performed by: STUDENT IN AN ORGANIZED HEALTH CARE EDUCATION/TRAINING PROGRAM

## 2024-08-14 PROCEDURE — 80053 COMPREHEN METABOLIC PANEL: CPT | Performed by: STUDENT IN AN ORGANIZED HEALTH CARE EDUCATION/TRAINING PROGRAM

## 2024-08-14 PROCEDURE — 74181 MRI ABDOMEN W/O CONTRAST: CPT

## 2024-08-14 RX ORDER — FENTANYL CITRATE 50 UG/ML
50 INJECTION, SOLUTION INTRAMUSCULAR; INTRAVENOUS ONCE
Status: COMPLETED | OUTPATIENT
Start: 2024-08-14 | End: 2024-08-14

## 2024-08-14 RX ORDER — LIDOCAINE 40 MG/G
CREAM TOPICAL
Status: DISCONTINUED | OUTPATIENT
Start: 2024-08-14 | End: 2024-08-14 | Stop reason: HOSPADM

## 2024-08-14 RX ORDER — MAGNESIUM HYDROXIDE/ALUMINUM HYDROXICE/SIMETHICONE 120; 1200; 1200 MG/30ML; MG/30ML; MG/30ML
15 SUSPENSION ORAL ONCE
Status: COMPLETED | OUTPATIENT
Start: 2024-08-14 | End: 2024-08-14

## 2024-08-14 RX ORDER — FAMOTIDINE 10 MG
10 TABLET ORAL 2 TIMES DAILY
Status: DISCONTINUED | OUTPATIENT
Start: 2024-08-14 | End: 2024-08-14 | Stop reason: HOSPADM

## 2024-08-14 RX ORDER — SUCRALFATE 1 G/1
1 TABLET ORAL 4 TIMES DAILY
Qty: 28 TABLET | Refills: 0 | Status: ON HOLD | OUTPATIENT
Start: 2024-08-14 | End: 2024-08-16

## 2024-08-14 RX ORDER — METOCLOPRAMIDE HYDROCHLORIDE 5 MG/ML
10 INJECTION INTRAMUSCULAR; INTRAVENOUS ONCE
Status: COMPLETED | OUTPATIENT
Start: 2024-08-14 | End: 2024-08-14

## 2024-08-14 RX ORDER — DIPHENHYDRAMINE HYDROCHLORIDE 50 MG/ML
50 INJECTION INTRAMUSCULAR; INTRAVENOUS ONCE
Status: COMPLETED | OUTPATIENT
Start: 2024-08-14 | End: 2024-08-14

## 2024-08-14 RX ADMIN — SODIUM CHLORIDE 1000 ML: 9 INJECTION, SOLUTION INTRAVENOUS at 04:08

## 2024-08-14 RX ADMIN — FENTANYL CITRATE 50 MCG: 50 INJECTION, SOLUTION INTRAMUSCULAR; INTRAVENOUS at 04:07

## 2024-08-14 RX ADMIN — FENTANYL CITRATE 50 MCG: 50 INJECTION, SOLUTION INTRAMUSCULAR; INTRAVENOUS at 05:53

## 2024-08-14 RX ADMIN — ALUMINUM HYDROXIDE, MAGNESIUM HYDROXIDE, AND SIMETHICONE 15 ML: 1200; 120; 1200 SUSPENSION ORAL at 02:17

## 2024-08-14 RX ADMIN — DIPHENHYDRAMINE HYDROCHLORIDE 50 MG: 50 INJECTION INTRAMUSCULAR; INTRAVENOUS at 04:09

## 2024-08-14 RX ADMIN — METOCLOPRAMIDE 10 MG: 5 INJECTION, SOLUTION INTRAMUSCULAR; INTRAVENOUS at 04:10

## 2024-08-14 ASSESSMENT — COLUMBIA-SUICIDE SEVERITY RATING SCALE - C-SSRS
2. HAVE YOU ACTUALLY HAD ANY THOUGHTS OF KILLING YOURSELF IN THE PAST MONTH?: NO
6. HAVE YOU EVER DONE ANYTHING, STARTED TO DO ANYTHING, OR PREPARED TO DO ANYTHING TO END YOUR LIFE?: NO
6. HAVE YOU EVER DONE ANYTHING, STARTED TO DO ANYTHING, OR PREPARED TO DO ANYTHING TO END YOUR LIFE?: NO
2. HAVE YOU ACTUALLY HAD ANY THOUGHTS OF KILLING YOURSELF IN THE PAST MONTH?: NO
1. IN THE PAST MONTH, HAVE YOU WISHED YOU WERE DEAD OR WISHED YOU COULD GO TO SLEEP AND NOT WAKE UP?: NO
1. IN THE PAST MONTH, HAVE YOU WISHED YOU WERE DEAD OR WISHED YOU COULD GO TO SLEEP AND NOT WAKE UP?: YES

## 2024-08-14 ASSESSMENT — ACTIVITIES OF DAILY LIVING (ADL)
ADLS_ACUITY_SCORE: 20

## 2024-08-14 NOTE — ED TRIAGE NOTES
Pt arrives from home with partner with complaints of abdominal pain that started tonight. Patient states the pain feel similar to her gallbladder pain in the past. Patient has hx of cholecystectomy. Was triaged in maternity d/t being 31 weeks pregnant and sent to er for further gi work up. Pt received maalox in maternity and took pepcid tonight prior to bed. No further complaints at this time. Pain 9/10     Triage Assessment (Adult)       Row Name 08/14/24 0332          Triage Assessment    Airway WDL WDL        Respiratory WDL    Respiratory WDL WDL        Skin Circulation/Temperature WDL    Skin Circulation/Temperature WDL WDL        Cardiac WDL    Cardiac WDL WDL        Peripheral/Neurovascular WDL    Peripheral Neurovascular WDL WDL        Cognitive/Neuro/Behavioral WDL    Cognitive/Neuro/Behavioral WDL WDL

## 2024-08-14 NOTE — DISCHARGE INSTRUCTIONS
Your blood work today was reassuring.  The MRCP showed no significant findings.    I suspect your pain is most likely from your stomach.  Please start this new medication which should help with pain.  Continue to use your other antiacid medications.    Follow-up with your OB/GYN in the next few days for repeat evaluation.  Return for any worsening symptoms

## 2024-08-14 NOTE — ED NOTES
EMERGENCY DEPARTMENT SIGN OUT NOTE        ED COURSE AND MEDICAL DECISION MAKING  Patient was signed out to me by Dr Manuel Zelaya at 0500    In brief, Amanda Miller is a 24 year old female who initially presented abd pain     At time of sign out, disposition was pending MRCP    -Patient's MRCP is normal.  No signs of appendicitis which I confirmed from the radiologist.  No other specific specific findings that would suggest discomfort.  - Patient does report to me that she has been having worsening reflux.  I suspect this is some sort of esophagitis versus gastritis.  No other morbidity or mortality causing process found today.  Discharged home in stable condition with a prescription for Carafate.    FINAL IMPRESSION    1. Abdominal pain, epigastric        ED MEDS  Medications   famotidine (PEPCID) tablet 10 mg (has no administration in time range)   lidocaine 1 % 0.1-1 mL (has no administration in time range)   lidocaine (LMX4) cream (has no administration in time range)   sodium chloride (PF) 0.9% PF flush 3 mL (3 mLs Intracatheter $Given 8/14/24 0414)   sodium chloride (PF) 0.9% PF flush 3 mL (has no administration in time range)   alum & mag hydroxide-simethicone (MAALOX) suspension 15 mL (15 mLs Oral $Given 8/14/24 0217)   sodium chloride 0.9% BOLUS 1,000 mL (0 mLs Intravenous Stopped 8/14/24 0538)   metoclopramide (REGLAN) injection 10 mg (10 mg Intravenous $Given 8/14/24 0410)   diphenhydrAMINE (BENADRYL) injection 50 mg (50 mg Intravenous $Given 8/14/24 0409)   fentaNYL (PF) (SUBLIMAZE) injection 50 mcg (50 mcg Intravenous $Given 8/14/24 0407)   fentaNYL (PF) (SUBLIMAZE) injection 50 mcg (50 mcg Intravenous $Given 8/14/24 0553)       LAB  Labs Ordered and Resulted from Time of ED Arrival to Time of ED Departure   BASIC METABOLIC PANEL - Abnormal       Result Value    Sodium 136      Potassium 4.3      Chloride 101      Carbon Dioxide (CO2) 22      Anion Gap 13      Urea Nitrogen 9.5      Creatinine  0.64      GFR Estimate >90      Calcium 9.0      Glucose 128 (*)    HEPATIC FUNCTION PANEL - Abnormal    Protein Total 6.8      Albumin 3.5      Bilirubin Total 0.4      Alkaline Phosphatase 166 (*)     AST 45      ALT 49      Bilirubin Direct <0.20     LIPASE - Abnormal    Lipase 62 (*)    CBC WITH PLATELETS AND DIFFERENTIAL - Abnormal    WBC Count 13.3 (*)     RBC Count 4.63      Hemoglobin 12.4      Hematocrit 37.5      MCV 81      MCH 26.8      MCHC 33.1      RDW 14.2      Platelet Count 237      % Neutrophils 87      % Lymphocytes 7      % Monocytes 5      % Eosinophils 0      % Basophils 0      % Immature Granulocytes 1      NRBCs per 100 WBC 0      Absolute Neutrophils 11.6 (*)     Absolute Lymphocytes 0.9      Absolute Monocytes 0.7      Absolute Eosinophils 0.0      Absolute Basophils 0.0      Absolute Immature Granulocytes 0.1      Absolute NRBCs 0.0         EKG      RADIOLOGY    MR Abdomen MRCP without Contrast   Final Result   IMPRESSION:   1.  Prior cholecystectomy. Reservoir effect of the bile ducts. No choledocholithiasis.   2.  Unremarkable pancreas.   3.  Gravid uterus.   4.  Mild right hydronephrosis which may be physiologic related to pregnancy.          DISCHARGE MEDS  New Prescriptions    SUCRALFATE (CARAFATE) 1 GM TABLET    Take 1 tablet (1 g) by mouth 4 times daily for 7 days       Gustavo Suazo DO  [facility] EMERGENCY DEPARTMENT  [facility] [facility] [id number] [phone]         Gustavo Suazo DO  08/14/24 0708

## 2024-08-14 NOTE — ED PROVIDER NOTES
EMERGENCY DEPARTMENT ENCOUNTER      NAME: Amanda Miller  AGE: 24 year old female  YOB: 2000  MRN: [id number]  EVALUATION DATE & TIME: 8/14/2024  1:31 AM    PCP: Bety Zepeda    ED PROVIDER: Manuel Zelaya MD      Chief Complaint   Patient presents with    Abdominal Pain    Abdominal Pain         FINAL IMPRESSION:  No diagnosis found.      ED COURSE & MEDICAL DECISION MAKING:    Pertinent Labs & Imaging studies reviewed. (See chart for details)  24 year old female presents to the Emergency Department for evaluation of abd pain    ED Course as of 08/14/24 0504   Wed Aug 14, 2024   0351 Patient is a 24-year-old female who presents to the emergency department at 31 weeks pregnant with epigastric abdominal pain, and was already seen on the labor and delivery unit, where she was found to not be in labor, and baby looks good, so sent back to the ER for further workup regarding her abdominal pain.  She states that this feels similar to when she had her gallbladder removed approximately 1 year ago when she had pancreatitis.  She has tenderness in her epigastrium and right upper quadrant.  No jaundice, pallor, abdominal rigidity to suggest peritonitis, urinary symptoms, fever.  She does have nausea and vomiting.  Differential diagnosis includes retained gallstone, pancreatitis, gastritis, gastric ulcer.  Patient already treated with GI cocktail by labor and delivery unit, will provide Reglan, Benadryl, fentanyl, fluids and obtain LFTs, lipase, chemistry panel and CBC prior to deciding if imaging is indicated.   0429 Mild elevation in alk phos at 166.  No other significant transaminitis.  Lipase is at 62, which does not meet criteria for pancreatitis as it is not 3 times the upper limit of normal.  No electrolyte abnormalities or kidney injury.  Mild leukocytosis of 13.3.  No anemia.  Given the symptoms that she is describing as some similar to when she had her gallstones, will obtain MRCP to  further evaluate for retained stone or de carmen intrahepatic stone.     5:03 AM patient signed out to Dr. Suazo with the following to do:  -Follow-up MRCP.  If negative, discharge home.    Medical Decision Making  Obtained supplemental history:Supplemental history obtained?: No  Reviewed external records: External records reviewed?: Documented in chart  Care impacted by chronic illness:Mental Health  Care significantly affected by social determinants of health:Access to Medical Care  Did you consider but not order tests?: Work up considered but not performed and documented in chart, if applicable  Did you interpret images independently?: Independent interpretation of ECG and images noted in documentation, when applicable.  Consultation discussion with other provider:Did you involve another provider (consultant, , pharmacy, etc.)?: No  Admission considered. Patient was signed out to the oncoming physician, disposition pending.        At the conclusion of the encounter I discussed the results of all of the tests and the disposition. The questions were answered. The patient or family acknowledged understanding and was agreeable with the care plan.     0 minutes of critical care time     MEDICATIONS GIVEN IN THE EMERGENCY:  Medications   famotidine (PEPCID) tablet 10 mg (has no administration in time range)   lidocaine 1 % 0.1-1 mL (has no administration in time range)   lidocaine (LMX4) cream (has no administration in time range)   sodium chloride (PF) 0.9% PF flush 3 mL (3 mLs Intracatheter $Given 8/14/24 0414)   sodium chloride (PF) 0.9% PF flush 3 mL (has no administration in time range)   sodium chloride 0.9% BOLUS 1,000 mL (1,000 mLs Intravenous $New Bag 8/14/24 0297)   alum & mag hydroxide-simethicone (MAALOX) suspension 15 mL (15 mLs Oral $Given 8/14/24 0217)   metoclopramide (REGLAN) injection 10 mg (10 mg Intravenous $Given 8/14/24 0410)   diphenhydrAMINE (BENADRYL) injection 50 mg (50 mg Intravenous $Given  "8/14/24 0409)   fentaNYL (PF) (SUBLIMAZE) injection 50 mcg (50 mcg Intravenous $Given 8/14/24 0407)       NEW PRESCRIPTIONS STARTED AT TODAY'S ER VISIT  New Prescriptions    No medications on file          =================================================================    HPI    Patient information was obtained from: the patient    Use of : N/A        Amanda Miller is a 24 year old female with a pertinent history of cholelithiasis, cholecystitis, anxiety, depression, who presents to this ED via walk-in for evaluation of abdominal pain.    The patient developed an onset of epigastric pain around midnight (3 hours ago). She is currently 31 weeks pregnant. She was triaged in maternity and reports the baby was fine. She was sent back to the ER because she notes her abdominal pain feels similar to the time she had a gallbladder attack. She has a history of cholecystectomy. No other abdominal surgeries. The patient also reports of pain between her shoulder blade. She feels dizzy and attributes this with her pain. She endorses nausea and vomiting as well. Her last meal was around 9 PM last night. She had a normal bowel movement yesterday and a small bowel movement just prior to arrival. No other significant medical history. No complaints of burning dysuria or any other associated symptoms at this time.      PAST MEDICAL HISTORY:  Past Medical History:   Diagnosis Date    Anxiety     Depression     Depressive disorder     no medications currently, per pt \"midwife would like me to start again after pregnancy\"    Obesity     Urinary tract infection        PAST SURGICAL HISTORY:  Past Surgical History:   Procedure Laterality Date    CHOLANGIOGRAM N/A 6/1/2023    Procedure: INTRAOPERATIVE CHOLANGIOGRAMS;  Surgeon: Dimas Cloud DO;  Location: [facility] OR    ESOPHAGOSCOPY, GASTROSCOPY, DUODENOSCOPY (EGD), COMBINED N/A 6/8/2023    Procedure: ENDOSCOPIC ULTRASOUND;  Surgeon: Eleuterio Kline MD;  [place] [facility] [facility] [place]    LAPAROSCOPIC CHOLECYSTECTOMY N/A 6/1/2023    Procedure: CHOLECYSTECTOMY, ROBOT-ASSISTED, LAPAROSCOPIC, USING DA LINDSAY XI,;  Surgeon: Dimas Cloud DO;  Location: [facility] [place]    TONSILLECTOMY      WISDOM TOOTH EXTRACTION             CURRENT MEDICATIONS:    Feeding Supplies ([employer]ED[employer] MILK STORAGE BAGS) MISC        ALLERGIES:  No Known Allergies    FAMILY HISTORY:  Family History   Problem Relation Age of Onset    Depression Mother     Depression Father     Cancer Paternal Grandmother        SOCIAL HISTORY:   Social History     Socioeconomic History    Marital status:      Spouse name: Roslyn    Number of children: Karri    Years of education: None    Highest education level: Some college, no degree   Tobacco Use    Smoking status: Never     Passive exposure: Never    Smokeless tobacco: Never   Vaping Use    Vaping status: Never Used   Substance and Sexual Activity    Alcohol use: Not Currently     Comment: occas    Drug use: Never    Sexual activity: Yes     Partners: Male     Birth control/protection: None     Social Determinants of Health     Financial Resource Strain: Low Risk  (10/23/2023)    Financial Resource Strain     Within the past 12 months, have you or your family members you live with been unable to get utilities (heat, electricity) when it was really needed?: No   Food Insecurity: Low Risk  (10/23/2023)    Food Insecurity     Within the past 12 months, did you worry that your food would run out before you got money to buy more?: No     Within the past 12 months, did the food you bought just not last and you didn t have money to get more?: No   Transportation Needs: Low Risk  (10/23/2023)    Transportation Needs     Within the past 12 months, has lack of transportation kept you from medical appointments, getting your medicines, non-medical meetings or appointments, work, or from getting things that you need?: No    Received from [facility], [facility]  [facility]    Social Connections   Interpersonal Safety: Low Risk  (10/23/2023)    Interpersonal Safety     Do you feel physically and emotionally safe where you currently live?: Yes     Within the past 12 months, have you been hit, slapped, kicked or otherwise physically hurt by someone?: No     Within the past 12 months, have you been humiliated or emotionally abused in other ways by your partner or ex-partner?: No   Housing Stability: Low Risk  (10/23/2023)    Housing Stability     Do you have housing? : Yes     Are you worried about losing your housing?: No       VITALS:  BP 93/57   Pulse 73   Temp 98.1  F (36.7  C) (Oral)   Resp 18   Wt 93.9 kg (207 lb)   LMP  (LMP Unknown)   BMI 40.43 kg/m      PHYSICAL EXAM    Physical Exam  Vitals and nursing note reviewed.   Constitutional:       General: She is not in acute distress.     Appearance: Normal appearance. She is normal weight. She is not ill-appearing.   HENT:      Head: Normocephalic and atraumatic.      Nose: Nose normal.      Mouth/Throat:      Mouth: Mucous membranes are moist.      Pharynx: Oropharynx is clear.   Eyes:      Extraocular Movements: Extraocular movements intact.      Conjunctiva/sclera: Conjunctivae normal.      Pupils: Pupils are equal, round, and reactive to light.   Cardiovascular:      Rate and Rhythm: Normal rate and regular rhythm.      Pulses: Normal pulses.      Heart sounds: Normal heart sounds. No murmur heard.  Pulmonary:      Effort: Pulmonary effort is normal. No respiratory distress.      Breath sounds: Normal breath sounds.   Abdominal:      General: Abdomen is flat. There is no distension.      Palpations: Abdomen is soft.      Tenderness: There is abdominal tenderness (RUQ, epigastric). There is no right CVA tenderness, left CVA tenderness, guarding or rebound.   Musculoskeletal:         General: Normal range of motion.      Cervical back: Normal range of motion.      Right lower leg: No  edema.      Left lower leg: No edema.   Skin:     General: Skin is warm and dry.      Capillary Refill: Capillary refill takes less than 2 seconds.      Coloration: Skin is not jaundiced or pale.   Neurological:      General: No focal deficit present.      Mental Status: She is alert and oriented to person, place, and time. Mental status is at baseline.   Psychiatric:         Mood and Affect: Mood normal.         Behavior: Behavior normal.         Thought Content: Thought content normal.         Judgment: Judgment normal.            LAB:  All pertinent labs reviewed and interpreted.  Results for orders placed or performed during the hospital encounter of 08/14/24   Basic metabolic panel   Result Value Ref Range    Sodium 136 135 - 145 mmol/L    Potassium 4.3 3.4 - 5.3 mmol/L    Chloride 101 98 - 107 mmol/L    Carbon Dioxide (CO2) 22 22 - 29 mmol/L    Anion Gap 13 7 - 15 mmol/L    Urea Nitrogen 9.5 6.0 - 20.0 mg/dL    Creatinine 0.64 0.51 - 0.95 mg/dL    GFR Estimate >90 >60 mL/min/1.73m2    Calcium 9.0 8.8 - 10.4 mg/dL    Glucose 128 (H) 70 - 99 mg/dL   Hepatic function panel   Result Value Ref Range    Protein Total 6.8 6.4 - 8.3 g/dL    Albumin 3.5 3.5 - 5.2 g/dL    Bilirubin Total 0.4 <=1.2 mg/dL    Alkaline Phosphatase 166 (H) 40 - 150 U/L    AST 45 0 - 45 U/L    ALT 49 0 - 50 U/L    Bilirubin Direct <0.20 0.00 - 0.30 mg/dL   Result Value Ref Range    Lipase 62 (H) 13 - 60 U/L   CBC with platelets and differential   Result Value Ref Range    WBC Count 13.3 (H) 4.0 - 11.0 10e3/uL    RBC Count 4.63 3.80 - 5.20 10e6/uL    Hemoglobin 12.4 11.7 - 15.7 g/dL    Hematocrit 37.5 35.0 - 47.0 %    MCV 81 78 - 100 fL    MCH 26.8 26.5 - 33.0 pg    MCHC 33.1 31.5 - 36.5 g/dL    RDW 14.2 10.0 - 15.0 %    Platelet Count 237 150 - 450 10e3/uL    % Neutrophils 87 %    % Lymphocytes 7 %    % Monocytes 5 %    % Eosinophils 0 %    % Basophils 0 %    % Immature Granulocytes 1 %    NRBCs per 100 WBC 0 <1 /100    Absolute Neutrophils  11.6 (H) 1.6 - 8.3 10e3/uL    Absolute Lymphocytes 0.9 0.8 - 5.3 10e3/uL    Absolute Monocytes 0.7 0.0 - 1.3 10e3/uL    Absolute Eosinophils 0.0 0.0 - 0.7 10e3/uL    Absolute Basophils 0.0 0.0 - 0.2 10e3/uL    Absolute Immature Granulocytes 0.1 <=0.4 10e3/uL    Absolute NRBCs 0.0 10e3/uL       RADIOLOGY:  Reviewed all pertinent imaging. Please see official radiology report.  MR Abdomen MRCP w/o & w Contrast    (Results Pending)       PROCEDURES:   None      [employer] Documentation:   CMS Diagnoses:               I, Reg Mijares, am serving as a scribe to document services personally performed by Manuel Zelaya MD  based on my observation and the provider's statements to me. I, Manuel Zelaya, attest that Reg Mijares is acting in a scribe capacity, has observed my performance of the services and has documented them in accordance with my direction.    Manuel Zelaya MD  [facility] EMERGENCY DEPARTMENT  [address] [place] [id number] [phone]       Manuel Zelaya MD  08/14/24 2763

## 2024-08-14 NOTE — PROGRESS NOTES
Data: Patient presented to [facility]: 2024  1:31 AM.  Reason for maternal/fetal assessment is abdominal pain. Patient reports abdominal pain. Patient denies uterine contractions, leaking of vaginal fluid/rupture of membranes, vaginal bleeding, headache, visual disturbances, epigastric or RUQ pain, significant edema. Patient reports fetal movement is normal. Patient is a 31w0d .  Prenatal record reviewed. Pregnancy has been uncomplicated.    Vital signs wnl. Support person is present.   Patient came in for assessment of abdominal pain that started around 0100. Patient rates pain 9 of 10. States pain is bilateral in upper belly above fundus. She took famotidine for acid reflex last night.   Maalox solution given, and patient denies getting relief.   Vitals signs wnl. Patient denies complications this pregnancy. Is taking famotidine, bupropion, and prenatals daily.  Gallbladder taken out 2023.  One emesis @ 0250 , 300 ml  Last ate 2100- soup.      Action: Verbal consent for EFM. Triage assessment completed.   EFM tracing category 1, moderate variability with accels. No concerns observed.   Response: Patient verbalized agreement with plan. Jelani Yousif [facility] notified of patient admission. Referred patient to Dr Grant. Dr Grant referred patient to ED for a workup.   Okay to take patient off EFM.

## 2024-08-15 ENCOUNTER — HOSPITAL ENCOUNTER (INPATIENT)
Facility: HOSPITAL | Age: 24
LOS: 1 days | Discharge: HOME | End: 2024-08-16
Attending: STUDENT IN AN ORGANIZED HEALTH CARE EDUCATION/TRAINING PROGRAM | Admitting: STUDENT IN AN ORGANIZED HEALTH CARE EDUCATION/TRAINING PROGRAM
Payer: COMMERCIAL

## 2024-08-15 DIAGNOSIS — K85.90 ACUTE PANCREATITIS, UNSPECIFIED COMPLICATION STATUS, UNSPECIFIED PANCREATITIS TYPE: ICD-10-CM

## 2024-08-15 DIAGNOSIS — K85.10 ACUTE BILIARY PANCREATITIS WITHOUT INFECTION OR NECROSIS: Primary | ICD-10-CM

## 2024-08-15 PROBLEM — O09.93 HIGH-RISK PREGNANCY, THIRD TRIMESTER: Status: ACTIVE | Noted: 2024-08-15

## 2024-08-15 LAB
ALBUMIN MFR UR ELPH: 15.2 MG/DL
ALBUMIN SERPL BCG-MCNC: 3.1 G/DL (ref 3.5–5.2)
ALBUMIN SERPL BCG-MCNC: 3.6 G/DL (ref 3.5–5.2)
ALP SERPL-CCNC: 188 U/L (ref 40–150)
ALP SERPL-CCNC: 230 U/L (ref 40–150)
ALT SERPL W P-5'-P-CCNC: 138 U/L (ref 0–50)
ALT SERPL W P-5'-P-CCNC: 155 U/L (ref 0–50)
AMYLASE SERPL-CCNC: 296 U/L (ref 28–100)
AMYLASE SERPL-CCNC: 753 U/L (ref 28–100)
ANION GAP SERPL CALCULATED.3IONS-SCNC: 10 MMOL/L (ref 7–15)
ANION GAP SERPL CALCULATED.3IONS-SCNC: 16 MMOL/L (ref 7–15)
AST SERPL W P-5'-P-CCNC: 140 U/L (ref 0–45)
AST SERPL W P-5'-P-CCNC: 150 U/L (ref 0–45)
BASOPHILS # BLD AUTO: 0 10E3/UL (ref 0–0.2)
BASOPHILS NFR BLD AUTO: 0 %
BILIRUB SERPL-MCNC: 0.7 MG/DL
BILIRUB SERPL-MCNC: 2.2 MG/DL
BUN SERPL-MCNC: 4.5 MG/DL (ref 6–20)
BUN SERPL-MCNC: 5 MG/DL (ref 6–20)
CALCIUM SERPL-MCNC: 8.6 MG/DL (ref 8.8–10.4)
CALCIUM SERPL-MCNC: 9.2 MG/DL (ref 8.8–10.4)
CHLORIDE SERPL-SCNC: 102 MMOL/L (ref 98–107)
CHLORIDE SERPL-SCNC: 105 MMOL/L (ref 98–107)
CHOLEST SERPL-MCNC: 224 MG/DL
CREAT SERPL-MCNC: 0.57 MG/DL (ref 0.51–0.95)
CREAT SERPL-MCNC: 0.61 MG/DL (ref 0.51–0.95)
CREAT UR-MCNC: 78.6 MG/DL
EGFRCR SERPLBLD CKD-EPI 2021: >90 ML/MIN/1.73M2
EGFRCR SERPLBLD CKD-EPI 2021: >90 ML/MIN/1.73M2
EOSINOPHIL # BLD AUTO: 0 10E3/UL (ref 0–0.7)
EOSINOPHIL NFR BLD AUTO: 0 %
ERYTHROCYTE [DISTWIDTH] IN BLOOD BY AUTOMATED COUNT: 14.3 % (ref 10–15)
GLUCOSE SERPL-MCNC: 69 MG/DL (ref 70–99)
GLUCOSE SERPL-MCNC: 86 MG/DL (ref 70–99)
HCO3 SERPL-SCNC: 19 MMOL/L (ref 22–29)
HCO3 SERPL-SCNC: 23 MMOL/L (ref 22–29)
HCT VFR BLD AUTO: 39.2 % (ref 35–47)
HDLC SERPL-MCNC: 52 MG/DL
HGB BLD-MCNC: 13.2 G/DL (ref 11.7–15.7)
HOLD SPECIMEN: NORMAL
HOLD SPECIMEN: NORMAL
IMM GRANULOCYTES # BLD: 0.1 10E3/UL
IMM GRANULOCYTES NFR BLD: 1 %
LDLC SERPL CALC-MCNC: 132 MG/DL
LIPASE SERPL-CCNC: 1390 U/L (ref 13–60)
LIPASE SERPL-CCNC: 949 U/L (ref 13–60)
LYMPHOCYTES # BLD AUTO: 1.1 10E3/UL (ref 0.8–5.3)
LYMPHOCYTES NFR BLD AUTO: 11 %
MCH RBC QN AUTO: 26.8 PG (ref 26.5–33)
MCHC RBC AUTO-ENTMCNC: 33.7 G/DL (ref 31.5–36.5)
MCV RBC AUTO: 80 FL (ref 78–100)
MONOCYTES # BLD AUTO: 0.5 10E3/UL (ref 0–1.3)
MONOCYTES NFR BLD AUTO: 6 %
NEUTROPHILS # BLD AUTO: 7.6 10E3/UL (ref 1.6–8.3)
NEUTROPHILS NFR BLD AUTO: 82 %
NONHDLC SERPL-MCNC: 172 MG/DL
NRBC # BLD AUTO: 0 10E3/UL
NRBC BLD AUTO-RTO: 0 /100
PLATELET # BLD AUTO: 253 10E3/UL (ref 150–450)
POTASSIUM SERPL-SCNC: 3.6 MMOL/L (ref 3.4–5.3)
POTASSIUM SERPL-SCNC: 4.1 MMOL/L (ref 3.4–5.3)
PROT SERPL-MCNC: 5.7 G/DL (ref 6.4–8.3)
PROT SERPL-MCNC: 6.9 G/DL (ref 6.4–8.3)
PROT/CREAT 24H UR: 0.19 MG/MG CR (ref 0–0.2)
RBC # BLD AUTO: 4.92 10E6/UL (ref 3.8–5.2)
SODIUM SERPL-SCNC: 137 MMOL/L (ref 135–145)
SODIUM SERPL-SCNC: 138 MMOL/L (ref 135–145)
TRIGL SERPL-MCNC: 202 MG/DL
WBC # BLD AUTO: 9.3 10E3/UL (ref 4–11)

## 2024-08-15 PROCEDURE — 83690 ASSAY OF LIPASE: CPT | Performed by: STUDENT IN AN ORGANIZED HEALTH CARE EDUCATION/TRAINING PROGRAM

## 2024-08-15 PROCEDURE — 258N000003 HC RX IP 258 OP 636: Performed by: STUDENT IN AN ORGANIZED HEALTH CARE EDUCATION/TRAINING PROGRAM

## 2024-08-15 PROCEDURE — 250N000011 HC RX IP 250 OP 636: Performed by: EMERGENCY MEDICINE

## 2024-08-15 PROCEDURE — 84156 ASSAY OF PROTEIN URINE: CPT | Performed by: STUDENT IN AN ORGANIZED HEALTH CARE EDUCATION/TRAINING PROGRAM

## 2024-08-15 PROCEDURE — 85025 COMPLETE CBC W/AUTO DIFF WBC: CPT | Performed by: STUDENT IN AN ORGANIZED HEALTH CARE EDUCATION/TRAINING PROGRAM

## 2024-08-15 PROCEDURE — 258N000003 HC RX IP 258 OP 636: Performed by: EMERGENCY MEDICINE

## 2024-08-15 PROCEDURE — 82150 ASSAY OF AMYLASE: CPT | Performed by: STUDENT IN AN ORGANIZED HEALTH CARE EDUCATION/TRAINING PROGRAM

## 2024-08-15 PROCEDURE — 84155 ASSAY OF PROTEIN SERUM: CPT | Performed by: STUDENT IN AN ORGANIZED HEALTH CARE EDUCATION/TRAINING PROGRAM

## 2024-08-15 PROCEDURE — 36415 COLL VENOUS BLD VENIPUNCTURE: CPT | Performed by: STUDENT IN AN ORGANIZED HEALTH CARE EDUCATION/TRAINING PROGRAM

## 2024-08-15 PROCEDURE — 250N000013 HC RX MED GY IP 250 OP 250 PS 637: Performed by: STUDENT IN AN ORGANIZED HEALTH CARE EDUCATION/TRAINING PROGRAM

## 2024-08-15 PROCEDURE — 80074 ACUTE HEPATITIS PANEL: CPT | Performed by: PHYSICIAN ASSISTANT

## 2024-08-15 PROCEDURE — 80061 LIPID PANEL: CPT | Performed by: STUDENT IN AN ORGANIZED HEALTH CARE EDUCATION/TRAINING PROGRAM

## 2024-08-15 PROCEDURE — 84450 TRANSFERASE (AST) (SGOT): CPT | Performed by: STUDENT IN AN ORGANIZED HEALTH CARE EDUCATION/TRAINING PROGRAM

## 2024-08-15 PROCEDURE — 120N000001 HC R&B MED SURG/OB

## 2024-08-15 PROCEDURE — 80053 COMPREHEN METABOLIC PANEL: CPT | Performed by: STUDENT IN AN ORGANIZED HEALTH CARE EDUCATION/TRAINING PROGRAM

## 2024-08-15 PROCEDURE — 96360 HYDRATION IV INFUSION INIT: CPT

## 2024-08-15 RX ORDER — LIDOCAINE 40 MG/G
CREAM TOPICAL
Status: DISCONTINUED | OUTPATIENT
Start: 2024-08-15 | End: 2024-08-16 | Stop reason: HOSPADM

## 2024-08-15 RX ORDER — ONDANSETRON 2 MG/ML
4 INJECTION INTRAMUSCULAR; INTRAVENOUS EVERY 6 HOURS PRN
Status: DISCONTINUED | OUTPATIENT
Start: 2024-08-15 | End: 2024-08-16 | Stop reason: HOSPADM

## 2024-08-15 RX ORDER — ACETAMINOPHEN 325 MG/1
650 TABLET ORAL EVERY 4 HOURS PRN
Status: DISCONTINUED | OUTPATIENT
Start: 2024-08-15 | End: 2024-08-16 | Stop reason: HOSPADM

## 2024-08-15 RX ORDER — FENTANYL CITRATE 50 UG/ML
50 INJECTION, SOLUTION INTRAMUSCULAR; INTRAVENOUS
Status: DISCONTINUED | OUTPATIENT
Start: 2024-08-15 | End: 2024-08-15

## 2024-08-15 RX ORDER — BUPROPION HYDROCHLORIDE 150 MG/1
300 TABLET ORAL EVERY MORNING
Status: DISCONTINUED | OUTPATIENT
Start: 2024-08-15 | End: 2024-08-16 | Stop reason: HOSPADM

## 2024-08-15 RX ORDER — FAMOTIDINE 20 MG/1
20 TABLET, FILM COATED ORAL 2 TIMES DAILY
COMMUNITY
Start: 2024-08-21

## 2024-08-15 RX ORDER — ACETAMINOPHEN 650 MG/1
650 SUPPOSITORY RECTAL EVERY 4 HOURS PRN
Status: DISCONTINUED | OUTPATIENT
Start: 2024-08-15 | End: 2024-08-16 | Stop reason: HOSPADM

## 2024-08-15 RX ORDER — FAMOTIDINE 20 MG/1
20 TABLET, FILM COATED ORAL 2 TIMES DAILY
Status: DISCONTINUED | OUTPATIENT
Start: 2024-08-15 | End: 2024-08-16 | Stop reason: HOSPADM

## 2024-08-15 RX ORDER — ONDANSETRON 4 MG/1
4 TABLET, ORALLY DISINTEGRATING ORAL EVERY 6 HOURS PRN
Status: DISCONTINUED | OUTPATIENT
Start: 2024-08-15 | End: 2024-08-16 | Stop reason: HOSPADM

## 2024-08-15 RX ORDER — BUPROPION HYDROCHLORIDE 300 MG/1
300 TABLET ORAL EVERY MORNING
COMMUNITY

## 2024-08-15 RX ORDER — SODIUM CHLORIDE 9 MG/ML
INJECTION, SOLUTION INTRAVENOUS CONTINUOUS
Status: DISCONTINUED | OUTPATIENT
Start: 2024-08-15 | End: 2024-08-15

## 2024-08-15 RX ORDER — SODIUM CHLORIDE, SODIUM LACTATE, POTASSIUM CHLORIDE, CALCIUM CHLORIDE 600; 310; 30; 20 MG/100ML; MG/100ML; MG/100ML; MG/100ML
INJECTION, SOLUTION INTRAVENOUS CONTINUOUS
Status: DISCONTINUED | OUTPATIENT
Start: 2024-08-15 | End: 2024-08-16 | Stop reason: HOSPADM

## 2024-08-15 RX ADMIN — SODIUM CHLORIDE, POTASSIUM CHLORIDE, SODIUM LACTATE AND CALCIUM CHLORIDE 1000 ML: 600; 310; 30; 20 INJECTION, SOLUTION INTRAVENOUS at 04:31

## 2024-08-15 RX ADMIN — SODIUM CHLORIDE, POTASSIUM CHLORIDE, SODIUM LACTATE AND CALCIUM CHLORIDE 1000 ML: 600; 310; 30; 20 INJECTION, SOLUTION INTRAVENOUS at 01:12

## 2024-08-15 RX ADMIN — SODIUM CHLORIDE, POTASSIUM CHLORIDE, SODIUM LACTATE AND CALCIUM CHLORIDE: 600; 310; 30; 20 INJECTION, SOLUTION INTRAVENOUS at 10:17

## 2024-08-15 RX ADMIN — FAMOTIDINE 20 MG: 10 INJECTION, SOLUTION INTRAVENOUS at 08:02

## 2024-08-15 RX ADMIN — SODIUM CHLORIDE: 9 INJECTION, SOLUTION INTRAVENOUS at 08:01

## 2024-08-15 RX ADMIN — FAMOTIDINE 20 MG: 20 TABLET ORAL at 20:02

## 2024-08-15 RX ADMIN — SODIUM CHLORIDE, POTASSIUM CHLORIDE, SODIUM LACTATE AND CALCIUM CHLORIDE: 600; 310; 30; 20 INJECTION, SOLUTION INTRAVENOUS at 20:03

## 2024-08-15 RX ADMIN — BUPROPION HYDROCHLORIDE 300 MG: 300 TABLET, EXTENDED RELEASE ORAL at 11:39

## 2024-08-15 ASSESSMENT — COLUMBIA-SUICIDE SEVERITY RATING SCALE - C-SSRS
1. IN THE PAST MONTH, HAVE YOU WISHED YOU WERE DEAD OR WISHED YOU COULD GO TO SLEEP AND NOT WAKE UP?: NO
6. HAVE YOU EVER DONE ANYTHING, STARTED TO DO ANYTHING, OR PREPARED TO DO ANYTHING TO END YOUR LIFE?: NO
2. HAVE YOU ACTUALLY HAD ANY THOUGHTS OF KILLING YOURSELF IN THE PAST MONTH?: NO

## 2024-08-15 ASSESSMENT — ACTIVITIES OF DAILY LIVING (ADL)
ADLS_ACUITY_SCORE: 37
ADLS_ACUITY_SCORE: 37
ADLS_ACUITY_SCORE: 22
ADLS_ACUITY_SCORE: 37
ADLS_ACUITY_SCORE: 37
ADLS_ACUITY_SCORE: 22
ADLS_ACUITY_SCORE: 37
ADLS_ACUITY_SCORE: 22
ADLS_ACUITY_SCORE: 37
ADLS_ACUITY_SCORE: 22
ADLS_ACUITY_SCORE: 37
ADLS_ACUITY_SCORE: 22
ADLS_ACUITY_SCORE: 22
ADLS_ACUITY_SCORE: 37
ADLS_ACUITY_SCORE: 22
ADLS_ACUITY_SCORE: 22
ADLS_ACUITY_SCORE: 37
ADLS_ACUITY_SCORE: 22
ADLS_ACUITY_SCORE: 37
ADLS_ACUITY_SCORE: 37
ADLS_ACUITY_SCORE: 22

## 2024-08-15 NOTE — PLAN OF CARE
Problem: Pain Acute  Goal: Optimal Pain Control and Function  Outcome: Progressing   Goal Outcome Evaluation:         Patient denies pain at this time.  Up to the bathroom independently.  Resting comfortably in bed between cares.      Temp: 98  F (36.7  C) Temp src: Oral BP: 92/50 Pulse: 80   Resp: 18 SpO2: 97 % O2 Device: None (Room air)

## 2024-08-15 NOTE — PROGRESS NOTES
[facility] Consultation     Amanda Miller  [address] [place] [address]  24 year old female     Admission Date/Time: 8/15/2024  Referring / Attending Physician:      HPI:  Amanda Miller is a 24 year old, 30 weeks 1 day pregnant, female with PMH cholecystitis complicated by pancreatitis s/p lap. Cholecystectomy (May 2023), recurrent elevated LFT's with normal EUS (June 2023), who presented to the ED early on 8/15 for epigastric pain and vomiting, who we were asked to see for acute pancreatitis.     On chart review, the patient presented to the ED 8/14 for similar symptoms, at which time, workup was performed including labs and imaging. Labs at that time revealed a mildly elevated lipase at 62, an elevated alk phos of 166 (up from 143 in June 2023), leukocytosis of 13.3. MRCP of the abdomen significant for prior cholecystectomy, reservoir effect of the bile ducts, no choledocholithiasis, unremarkable pancreas, gravid uterus, mild right hydronephrosis. The patient was discharged home with a prescription for Carafate.     She reports that yesterday after discharge, she developed worsening upper abdominal pain radiating into her back. This was associated with nausea and vomiting, prompting her re-presentation to the ED. She denies any changes in bowel movements, noting her last one was two days ago. She denies any black/bloody stools. She denies any other concerns at this time. She denies any alcohol use. Denies any NSAID use. Denies any other abdominal surgeries besides cholecystectomy. Denies any family history of pancreatitis, but notes her mother had her gallbladder removed.      PAST MEDICAL HISTORY:  Patient Active Problem List    Diagnosis Date Noted    Acute pancreatitis, unspecified complication status, unspecified pancreatitis type 08/15/2024     Priority: Medium    High-risk pregnancy, third trimester 08/15/2024     Priority: Medium    Encounter for triage in pregnant patient 08/14/2024      Priority: Medium    Epigastric pain 2023     Priority: Medium    Acute biliary pancreatitis without infection or necrosis 2023     Priority: Medium    Cholelithiasis 2023     Priority: Medium    Cholecystitis 2023     Priority: Medium    Echogenic intracardiac focus of fetus on prenatal ultrasound 2022     Priority: Medium     Level II FAS noted  nml NIPS      Positive depression screening 2022     Priority: Medium     2022: EPDS 10/30, 0 #10  22: Declines repeat EPDS.  23: EPDS= 8, 0 to #10. ALL-7: 2.       At risk for venous thromboembolism (VTE) 2022     Priority: Medium     Pregravid BMI greater than 40      BMI 40.0-44.9, adult (H) 2022     Priority: Medium     Pre-pregnant BMI 43  Total weight gain recommended in pregnancy: 0 pounds    If prepregnant BMI >=40 - If pt has no other co-morbidities (personal history GDM,    with macrosomia, desires , history of shoulder dystocia, late to care, etc.) she may be   accepted into care. Those women who have one or more risk factors present at IOB must be   presented to CNM group in order to accept care.    Pt education on health risks and lifestyle interventions   Health screening: hgb A1C   Recommend dating ultrasound (rationale: importance of accurate dating)   Recommend consultation with nutritionist.   Consider consult visit with consulting physician to review history and risk factors   Advise on recommended weight gain and review weight gain at each prenatal visit. If   patient exceeds recommended weight gain, or develops co-morbidities consider   transfer to OB management    and offer Level II US at 20 weeks with ECHO   Consider growth US at 28 and 32 weeks. May do growth US at 36 weeks if noting S:D   discrepancy.    and offer NST and BPP weekly starting at 34 weeks   Offer induction of labor at 39 weeks.   Advise on VTE prophylaxis recommendations      Depression  07/26/2021     Priority: Medium    Anxiety 07/26/2021     Priority: Medium     SOCIAL HISTORY:  Social History     Tobacco Use    Smoking status: Never     Passive exposure: Never    Smokeless tobacco: Never   Vaping Use    Vaping status: Never Used   Substance Use Topics    Alcohol use: Not Currently    Drug use: Never     FAMILY HISTORY:  Family History   Problem Relation Age of Onset    Depression Mother     Depression Father     Cancer Paternal Grandmother      ALLERGIES: No Known Allergies  MEDICATIONS:   Current Facility-Administered Medications   Medication Dose Route Frequency Provider Last Rate Last Admin    acetaminophen (TYLENOL) tablet 650 mg  650 mg Oral Q4H PRN Carla Mims MD        Or    acetaminophen (TYLENOL) Suppository 650 mg  650 mg Rectal Q4H PRN Carla Mims MD        buPROPion (WELLBUTRIN XL) 24 hr tablet 300 mg  300 mg Oral QAM Carla Mims MD   300 mg at 08/15/24 1139    famotidine (PEPCID) tablet 20 mg  20 mg Oral BID Carla Mims MD        lactated ringers infusion   Intravenous Continuous Carla Mims  mL/hr at 08/15/24 1017 New Bag at 08/15/24 1017    lidocaine (LMX4) cream   Topical Q1H PRN Carla Mims MD        lidocaine 1 % 0.1-1 mL  0.1-1 mL Other Q1H PRN Carla Mims MD        ondansetron (ZOFRAN ODT) ODT tab 4 mg  4 mg Oral Q6H PRN Carla Mims MD        Or    ondansetron (ZOFRAN) injection 4 mg  4 mg Intravenous Q6H PRN Carla Mims MD        sodium chloride (PF) 0.9% PF flush 3 mL  3 mL Intracatheter Q8H Carla Mims MD   3 mL at 08/15/24 1015    sodium chloride (PF) 0.9% PF flush 3 mL  3 mL Intracatheter q1 min prn Carla Mims MD           PHYSICAL EXAM:   BP 92/50 (BP Location: Left arm, Patient Position: Semi-Guerrero's)   Pulse 80   Temp 98  F (36.7  C) (Oral)   Resp 18   Ht 1.524 m (5')   Wt 93.9 kg (207 lb)   LMP  (LMP Unknown)   SpO2 97%   BMI 40.43 kg/m     GEN: sitting  upright in bed, no acute distress, s/o at bedside  HEENT: No icterus  HRT: appears well perfused  LUNGS: Normal respiratory effort  ABD: gravid abdomen, no upper abdominal tenderness  SKIN: Visualized skin intact without rash  MSKL: no LE edema  NEURO: Alert, answers questions appropriately     ADDITIONAL DATA:   I reviewed the patient's new clinical lab test results.   Recent Labs   Lab Test 08/15/24  0019 08/14/24  0341 12/31/23 2005 06/01/23  1925 06/01/23  0711   WBC 9.3 13.3* 9.8   < > 8.7   HGB 13.2 12.4 13.7   < > 12.4   MCV 80 81 80   < > 86    237 281   < > 274   INR  --   --   --   --  1.01    < > = values in this interval not displayed.     Recent Labs   Lab Test 08/15/24  1137 08/15/24  0019 08/14/24  0341   POTASSIUM 4.1 3.6 4.3   CHLORIDE 105 102 101   CO2 23 19* 22   BUN 5.0* 4.5* 9.5   ANIONGAP 10 16* 13     Recent Labs   Lab Test 08/15/24  1137 08/15/24  0019 08/14/24  0341 12/18/23  1437 06/08/23  0644 06/07/23  1748 05/31/23  1157 05/31/23  0526   ALBUMIN 3.1* 3.6 3.5  --    < >  --    < >  --    BILITOTAL 0.7 2.2* 0.4  --    < >  --    < >  --    * 138* 49  --    < >  --    < >  --    * 150* 45  --    < >  --    < >  --    PROTEIN  --   --   --  Negative  --  Negative  --  20*   LIPASE 949* 1,390* 62*  --    < >  --    < >  --    AMYLASE 753* 296*  --   --   --   --   --   --     < > = values in this interval not displayed.     Imaging results:    MR Abdomen MRCP w/o contrast 8/14/2024  IMPRESSION:  1.  Prior cholecystectomy. Reservoir effect of the bile ducts. No choledocholithiasis.  2.  Unremarkable pancreas.  3.  Gravid uterus.  4.  Mild right hydronephrosis which may be physiologic related to pregnancy.    ASSESSMENT:    Amanda B Miller is a 24 year old, 30 weeks 1 day pregnant, female with PMH cholecystitis complicated by pancreatitis s/p lap. Cholecystectomy (May 2023), recurrent elevated LFT's with normal EUS (June 2023), who presented to the ED early on 8/15 for  epigastric pain and vomiting, who we were asked to see for acute pancreatitis.     Epigastric pain/pancreatitis: This is the patient's third episode of pancreatitis within about the last year.  Her first episode was associated with cholecystitis (May 2023) and thought to be secondary to gallstones.  She presented again a week later with similar symptoms to this admission, at which time her lipase was found to be elevated again.  Reviewed the endoscopic ultrasound done during that admission which was normal.  She has not had any issues up until the last couple of days.  ED visit yesterday without any significant lab abnormalities and a normal MRCP.  Upon her representation, LFTs and lipase were found to be elevated compared to yesterday.  She was made n.p.o., and started on IV fluids.  Currently she is pain-free without analgesics and has not had any further nausea or vomiting episodes overnight.  Lipid panel was obtained and notable for elevations, but only in the low 100s.  Would expect these to be much higher if they were the cause of pancreatitis.  Also considered are gallstone pancreatitis, autoimmune pancreatitis, alcohol pancreatitis.  No stone seen on MRCP yesterday, but certainly still could be a gallstone present.  Will hold off on testing for autoimmune pancreatitis as it would not change the management in the acute setting.  She does not drink alcohol.  She has an appetite and is requesting to eat.  We will advance her diet to clear liquids, and if she tolerates this, we will advance her diet as tolerated.  Will plan to have her follow-up with one of our pancreatic specialists in the outpatient setting after delivery for further evaluation of recurrent episodes of pancreatitis.  30 weeks pregnant: The patient is being followed by family medicine/OB while in the hospital.    PLAN:  -Advance diet to clear liquids  -Monitor for recurrent pain  -If severe pain recurs/labs continue to elevate, consider repeat  MRCP  -Follow-up with pancreatic specialist after delivery, NICO will call to schedule    [facility]  8/15/2024 12:32 PM  [phone] (office)    This case was discussed with Dr. Billings who agrees to the above assessment and plan.    40 minutes of total time was spent today providing patient care, including patient evaluation, reviewing documentation/test result, and .   ________________________________________________________________________

## 2024-08-15 NOTE — PLAN OF CARE
Goal Outcome Evaluation:         Arrived from the ER today.  Alert and oriented. Independent in the room. Voiding. No complaints of pain at this time. No nausea.   Ice chips. GI to see.

## 2024-08-15 NOTE — ED NOTES
Spoke with maternity charge nurse, nursing supervisor and ER MD. Patient will be managed by er until seen by OB this morning and placed on proper floor. Reports given to JENY Hester

## 2024-08-15 NOTE — PROGRESS NOTES
OB RN to bedside for NST of 31w1d fetus at 03:10. NST reactive. Baseline: 130-135 bpm. 15x15 accelerations present x3. Decelerations absent.     EFM strip verified by Hina Gutierrez RN.

## 2024-08-15 NOTE — ED PROVIDER NOTES
Emergency Department Encounter         FINAL IMPRESSION:  Pancreatitis          ED COURSE AND MEDICAL DECISION MAKING         ED Course as of 08/15/24 0611   Thu Aug 15, 2024   0105 G3, P2 at 31 weeks, here with continued abdominal pain in the upper abdominal region as well as upper back and mid back.  Was seen 24 hours ago with a negative workup including MRCP and laboratory evaluation, here now with worsening symptoms.  Increased nausea no vomiting.  No vaginal bleeding or discharge.  No lower abdominal pain or cramping no chest pain or trouble breathing.  On arrival here she looks well.  Vitals are stable.  Has generalized abdominal discomfort in the upper quadrants, with a gravid abdomen otherwise.  Heart and lungs otherwise normal.  Patient's labs suggestive of new LFT abnormalities as well as a lipase is elevated.  Will touch base with [facility] regarding patient's admission as well as admit for fluids and pain medication.   0402 Spoke with the hospitalist who is recommending patient be admitted to the OB floor primarily because of the patient being over 20 weeks.  Call Dr. Phyllis wilson and she is agreeable with admission as primary.     1:30 AM Paged OB-Gyn.  2:01 AM Discussed patient with Dr. Mims, OB-Gyn.  Recommended NST.  I discussed this with charge nurse who will call up to OB to have this done here in the department.               Medical Decision Making  Obtained supplemental history:Supplemental history obtained?: Family Member/Significant Other  Reviewed external records: External records reviewed?: Documented in chart  Care impacted by chronic illness:N/A  Care significantly affected by social determinants of health:Access to Medical Care  Did you consider but not order tests?: Work up considered but not performed and documented in chart, if applicable  Did you interpret images independently?: Independent interpretation of ECG and images noted in documentation, when  applicable.  Consultation discussion with other provider:Did you involve another provider (consultant, , pharmacy, etc.)?: I discussed the care with another health care provider, see documentation for details.  Admit.              Critical Care     Performed by: Gustavo Suazo or    Authorized by: Gustavo Suazo  Total critical care time:  minutes  Critical care was necessary to treat or prevent imminent or life-threatening deterioration of the following conditions:   Critical care was time spent personally by me on the following activities: development of treatment plan with patient or surrogate, discussions with consultants, examination of patient, evaluation of patient's response to treatment, obtaining history from patient or surrogate, ordering and performing treatments and interventions, ordering and review of laboratory studies, ordering and review of radiographic studies, re-evaluation of patient's condition and monitoring for potential decompensation.  Critical care time was exclusive of separately billable procedures and treating other patients.'    At the conclusion of the encounter I discussed the results of all the tests and the disposition. The questions were answered. The patient or family acknowledged understanding and was agreeable with the care plan.        MEDICATIONS GIVEN IN THE EMERGENCY DEPARTMENT:  Medications   fentaNYL (PF) (SUBLIMAZE) injection 50 mcg (has no administration in time range)   lactated ringers BOLUS 1,000 mL (1,000 mLs Intravenous $New Bag 8/15/24 0112)       NEW PRESCRIPTIONS STARTED AT TODAY'S ED VISIT:  New Prescriptions    No medications on file       HPI     Patient information obtained from: Patient     Use of : N/A     Amanda KUO [place] is a 24 year old female with a pertinent history of cholecystitis, pancreatitis, anxiety who presents to this ED by walk in for evaluation of abdominal pain and back pain.    G3, P2 at 31 weeks, here with continued abdominal pain in the  upper abdominal region as well as upper back and mid back.  Was seen 24 hours ago with a negative workup including MRCP and laboratory evaluation, here now with worsening symptoms.  Increased nausea no vomiting.  No vaginal bleeding or discharge.  No lower abdominal pain or cramping no chest pain or trouble breathing.  On arrival here she looks well.  Vitals are stable.  Has generalized abdominal discomfort in the upper quadrants, with a gravid abdomen otherwise.        MEDICAL HISTORY     Past Medical History:   Diagnosis Date    Anxiety     Depression     Depressive disorder     Obesity     Urinary tract infection        Past Surgical History:   Procedure Laterality Date    CHOLANGIOGRAM N/A 6/1/2023    Procedure: INTRAOPERATIVE CHOLANGIOGRAMS;  Surgeon: Dimas Cloud DO;  Location: [facility] OR    ESOPHAGOSCOPY, GASTROSCOPY, DUODENOSCOPY (EGD), COMBINED N/A 6/8/2023    Procedure: ENDOSCOPIC ULTRASOUND;  Surgeon: Eleuterio Kline MD;  Location: [facility] OR    LAPAROSCOPIC CHOLECYSTECTOMY N/A 6/1/2023    Procedure: CHOLECYSTECTOMY, ROBOT-ASSISTED, LAPAROSCOPIC, USING DA LINDSAY XI,;  Surgeon: Dimas Cloud DO;  Location: [facility] OR    TONSILLECTOMY      WISDOM TOOTH EXTRACTION         Social History     Tobacco Use    Smoking status: Never     Passive exposure: Never    Smokeless tobacco: Never   Vaping Use    Vaping status: Never Used   Substance Use Topics    Alcohol use: Not Currently     Comment: occas    Drug use: Never       Feeding Supplies (AMEDA MILK STORAGE BAGS) MISC  sucralfate (CARAFATE) 1 GM tablet            PHYSICAL EXAM     /56   Pulse 70   Temp 98.4  F (36.9  C) (Oral)   Resp 18   Ht 1.524 m (5')   Wt 93.9 kg (207 lb)   LMP  (LMP Unknown)   SpO2 97%   BMI 40.43 kg/m        PHYSICAL EXAM:     General: Patient appears well, nontoxic, comfortable  HEENT: Moist mucous membranes,  No head trauma.    Cardiovascular: Normal rate, normal rhythm, no extremity edema.  No  appreciable murmur.  Respiratory: No signs of respiratory distress, lungs are clear to auscultation bilaterally with no wheezes rhonchi or rales.  Abdominal: Soft, generalized tenderness, nondistended, no palpable masses, no guarding, no rebound  Musculoskeletal: Full range of motion of joints, no deformities appreciated.  Neurological: Alert and oriented, grossly neurologically intact.  Psychological: Normal affect and mood.  Integument: No rashes appreciated          RESULTS       Labs Ordered and Resulted from Time of ED Arrival to Time of ED Departure   COMPREHENSIVE METABOLIC PANEL - Abnormal       Result Value    Sodium 137      Potassium 3.6      Carbon Dioxide (CO2) 19 (*)     Anion Gap 16 (*)     Urea Nitrogen 4.5 (*)     Creatinine 0.57      GFR Estimate >90      Calcium 9.2      Chloride 102      Glucose 86      Alkaline Phosphatase 230 (*)      (*)      (*)     Protein Total 6.9      Albumin 3.6      Bilirubin Total 2.2 (*)    LIPASE - Abnormal    Lipase 1,390 (*)    CBC WITH PLATELETS AND DIFFERENTIAL    WBC Count 9.3      RBC Count 4.92      Hemoglobin 13.2      Hematocrit 39.2      MCV 80      MCH 26.8      MCHC 33.7      RDW 14.3      Platelet Count 253      % Neutrophils 82      % Lymphocytes 11      % Monocytes 6      % Eosinophils 0      % Basophils 0      % Immature Granulocytes 1      NRBCs per 100 WBC 0      Absolute Neutrophils 7.6      Absolute Lymphocytes 1.1      Absolute Monocytes 0.5      Absolute Eosinophils 0.0      Absolute Basophils 0.0      Absolute Immature Granulocytes 0.1      Absolute NRBCs 0.0     PROTEIN RANDOM URINE       No orders to display         PROCEDURES:  Procedures:  Procedures       IDante am serving as a scribe to document services personally performed by Gustavo Suazo DO, based on my observations and the provider's statements to me.  I, Gustavo Suazo DO, attest that Dante Oliver is acting in a scribe capacity, has observed my performance of the services  and has documented them in accordance with my direction.    Gustavo Suazo DO  Emergency Medicine  [facility] EMERGENCY DEPARTMENT      Ohl, Gustavo Mcpherson DO  08/15/24 0617

## 2024-08-15 NOTE — MEDICATION SCRIBE - ADMISSION MEDICATION HISTORY
Medication Scribe Admission Medication History    Admission medication history is complete. The information provided in this note is only as accurate as the sources available at the time of the update.    Information Source(s): Patient and CareEverywhere/SureScripts via in-person    Pertinent Information: pt manages her own medications.  Famotidine  -pt takes twice daily, but stopped while on Carafate after reading the drug insert/pamphlet    Changes made to PTA medication list:  Added: Choline, Famotidine, Bupropion XL,  Deleted: None  Changed: None    Allergies reviewed with patient and updates made in EHR: yes    Medication History Completed By: Tru Angulo 8/15/2024 2:55 AM    PTA Med List   Medication Sig Last Dose    buPROPion (WELLBUTRIN XL) 300 MG 24 hr tablet Take 300 mg by mouth every morning 8/14/2024 at AM    CHOLINE BITARTRATE PO Take 800 mg by mouth daily 8/14/2024 at AM    famotidine (PEPCID) 20 MG tablet Take 20 mg by mouth 2 times daily 8/13/2024 at 08/13/24    Feeding Supplies (AMEDA MILK STORAGE BAGS) MISC 150 Units as needed (for storage of expressed/pumped breast milk) Unknown at unknown    sucralfate (CARAFATE) 1 GM tablet Take 1 tablet (1 g) by mouth 4 times daily for 7 days 8/14/2024 at 2130 (2nd dose)

## 2024-08-15 NOTE — H&P
[facility]    History and Physical - FM/OB       Date of Admission:  8/15/2024    Assessment & Plan      Amanda Miller is a 24 year old female admitted on 8/15/2024 for pancreatitis.     Acute Pancreatitis    - Lipase and LFTs elevated on admission   - Normal MRI    - Hospitalist service recommended GI consult   - GI consult,  appreciate recommendations and cares   - NPO until team ok to progress to clears   - IV LR    - Pain well managed with NPO at this time. Only Tylenol currently available currently.   - No history of alcohol use, Hx of cholecystectomy with no stones seen on MRI, Will add on a lipid    - Repeat CMP, lipase and amylase scheduled for later today.   30w1d pregnant  -Findings not consistent with HELLP syndrome at this time. No swelling, normal BP, normal protein/creat ratio, normal platelets   -Feeling normal baby movement   -NST stable in ED   -NST q shift    -Continue PTA famotidine  Anxiety/Depresion   - Continue PTA Wellbutrin       Diet: NPO for Medical/Clinical Reasons Except for: Ice Chips, MedsNPO  DVT Prophylaxis: Pneumatic Compression Devices  Keys Catheter: Not present  Lines: IV  Cardiac Monitoring: None  Code Status: Full CodeFull    Disposition Plan     Medically Ready for Discharge: Anticipated in 2-4 Days     Carla Mims, DO IBCLC  FM/OB  [facility]  Text page via [facility] Paging/Directory     ______________________________________________________________________    Chief Complaint   Abdominal pain    History is obtained from the patient    History of Present Illness   Amanda Miller is a  24 year old female who is currently 31w1d pregnant. Past Medical history includes pancreatitis due to gallstones, cholecystectomy and anxiety/depression.  She presented to the ED with upper abdominal pain.     Patient notes she developed a mid upper abdominal pain on 2024 at 1:00 am. Patient describes it as dull. She declines any radiation. She  "was evaluated in the ED early AM. Initial work up was negative so patient discharged home. At home patient noted pain continued to worsen. She was only able to eat small amounts but noted worsening in pain with food. Patient continued to drink fluids and had normal urination. Patient has not had a stool since the pain started but note normal stools at baseline. Patient denies any fever, chills, uri symptoms, burning with urination, or back pain. Patient notes baby has continued to move normally She declines any contractions or vaginal bleeding.         Past Medical History    Past Medical History:   Diagnosis Date    Anxiety     Depression     Depressive disorder     no medications currently, per pt \"midwife would like me to start again after pregnancy\"    Obesity     Urinary tract infection        Past Surgical History   Past Surgical History:   Procedure Laterality Date    CHOLANGIOGRAM N/A 2023    Procedure: INTRAOPERATIVE CHOLANGIOGRAMS;  Surgeon: Dimas Cloud DO;  Location: [facility] OR    ESOPHAGOSCOPY, GASTROSCOPY, DUODENOSCOPY (EGD), COMBINED N/A 2023    Procedure: ENDOSCOPIC ULTRASOUND;  Surgeon: Eleuterio Kline MD;  Location: [facility] OR    LAPAROSCOPIC CHOLECYSTECTOMY N/A 2023    Procedure: CHOLECYSTECTOMY, ROBOT-ASSISTED, LAPAROSCOPIC, USING DA LINDSAY XI,;  Surgeon: Dimas Cloud DO;  Location: [facility] [place] OR    TONSILLECTOMY      WISDOM TOOTH EXTRACTION         Prior to Admission Medications   Prior to Admission Medications   Prescriptions Last Dose Informant Patient Reported? Taking?   CHOLINE BITARTRATE PO 2024 at AM  Yes Yes   Sig: Take 800 mg by mouth daily   Feeding Supplies (AMEDA MILK STORAGE BAGS) MISC Unknown at unknown  No Yes   Si Units as needed (for storage of expressed/pumped breast milk)   buPROPion (WELLBUTRIN XL) 300 MG 24 hr tablet 2024 at AM  Yes Yes   Sig: Take 300 mg by mouth every morning   famotidine (PEPCID) 20 MG tablet 2024 at " 08/13/24  Yes Yes   Sig: Take 20 mg by mouth 2 times daily   sucralfate (CARAFATE) 1 GM tablet 8/14/2024 at 2130 (2nd dose)  No Yes   Sig: Take 1 tablet (1 g) by mouth 4 times daily for 7 days      Facility-Administered Medications: None        Review of Systems    The 10 point Review of Systems is negative other than noted in the HPI or here.     Social History   I have reviewed this patient's social history and updated it with pertinent information if needed.  Social History     Tobacco Use    Smoking status: Never     Passive exposure: Never    Smokeless tobacco: Never   Vaping Use    Vaping status: Never Used   Substance Use Topics    Alcohol use: Not Currently    Drug use: Never       Allergies   No Known Allergies     Physical Exam   Vital Signs: Temp: 98  F (36.7  C) Temp src: Oral BP: 92/50 Pulse: 80   Resp: 18 SpO2: 97 % O2 Device: None (Room air)    Weight: 207 lbs 0 oz    GENERAL:  Patient alert, in no acute distress.  EYES: Extraoccular movements intact, pupils equal, reactive to light and accommodation.  Normal conjunctiva and lids.   ENT:  Hearing grossly normal.  Normal appearance to ears and nose.    RESP:  Clear to auscultation without crackles, wheezes or distress.  Normal respiratory effort.   CV:  Regular rate and rhythm without murmurs, rubs or gallops.  Normal carotid, abdominal aorta, femoral and pedal pulses.  No varicosities or edema.  ABDOMEN:  Gravid, Soft, without hepatosplenomegaly, masses, or hernias. Mildly tender to palpation of upper abdomin  NEURO:  CN II-XII intact, motor & sensory function all intact.    PSYCHIATRIC:  Alert & oriented with normal mood and affect.  Good judgment and insight.  SKIN:  Normal inspection and palpation.  MUSCULOSKELETAL: no edema        Data     I have personally reviewed the following data over the past 24 hrs:    9.3  \   13.2   / 253     137 102 4.5 (L) /  86   3.6 19 (L) 0.57 \     ALT: 138 (H) AST: 150 (H) AP: 230 (H) TBILI: 2.2 (H)   ALB: 3.6 TOT  PROTEIN: 6.9 LIPASE: 1,390 (H)       Imaging results reviewed over the past 24 hrs:   MR Abdomen MRCP without Contrast    Result Date: 8/14/2024  EXAM: MR ABDOMEN MRCP W/O CONTRAST LOCATION: [facility] DATE: 8/14/2024 INDICATION: RUQ pain, hx of cholecystectomy, same sxs as when she had her gallbladder out; also 31 wks pregnant COMPARISON: MRI/MRCP 06/07/2023 TECHNIQUE: Routine MR liver/pancreas protocol including axial and coronal MRCP sequences. 2D and 3D reconstruction performed by MR technologist including MIP reconstruction and slab cholangiograms. Multiplanar T2 images were performed. T1 in and out of phase images as well as T1 precontrast images and diffusion weighted images performed. CONTRAST: None. FINDINGS: MRCP: Prior cholecystectomy. Reservoir effect of the bile ducts. No choledocholithiasis. LIVER: Unremarkable liver. PANCREAS: Unremarkable pancreas. ADDITIONAL FINDINGS: Gravid uterus. Examination not tailored for evaluation of fetal structures. Unremarkable lung bases. Unremarkable spleen. Unremarkable adrenal glands. Small right renal cyst which requires no dedicated follow-up. Mild right hydronephrosis which may be physiologic related to pregnancy. No bowel obstruction. No lymphadenopathy. No AAA. No acute osseous abnormality.     IMPRESSION: 1.  Prior cholecystectomy. Reservoir effect of the bile ducts. No choledocholithiasis. 2.  Unremarkable pancreas. 3.  Gravid uterus. 4.  Mild right hydronephrosis which may be physiologic related to pregnancy.

## 2024-08-15 NOTE — ED TRIAGE NOTES
"Pt arrives to triage ambulatory w/  endorsing was seen here yesterday for same issue and prescription not helping. Endorses pain in between shoulder blades and mid across abdomen abdominal pain \"feels like a gallbladder attack\" but had gallbladder removed. Nauseous and vomiting once yesterday and once today.    Pt is 31 weeks pregnant.    Meds PTA: Tylenol last 2310        "

## 2024-08-16 VITALS
RESPIRATION RATE: 16 BRPM | BODY MASS INDEX: 40.64 KG/M2 | OXYGEN SATURATION: 97 % | DIASTOLIC BLOOD PRESSURE: 57 MMHG | TEMPERATURE: 98.2 F | SYSTOLIC BLOOD PRESSURE: 98 MMHG | HEART RATE: 76 BPM | WEIGHT: 207 LBS | HEIGHT: 60 IN

## 2024-08-16 LAB
ALBUMIN SERPL BCG-MCNC: 3.2 G/DL (ref 3.5–5.2)
ALP SERPL-CCNC: 194 U/L (ref 40–150)
ALT SERPL W P-5'-P-CCNC: 244 U/L (ref 0–50)
AMYLASE SERPL-CCNC: 164 U/L (ref 28–100)
ANION GAP SERPL CALCULATED.3IONS-SCNC: 11 MMOL/L (ref 7–15)
AST SERPL W P-5'-P-CCNC: 207 U/L (ref 0–45)
BILIRUB SERPL-MCNC: 0.6 MG/DL
BUN SERPL-MCNC: 4.1 MG/DL (ref 6–20)
CALCIUM SERPL-MCNC: 8.9 MG/DL (ref 8.8–10.4)
CHLORIDE SERPL-SCNC: 102 MMOL/L (ref 98–107)
CREAT SERPL-MCNC: 0.61 MG/DL (ref 0.51–0.95)
EGFRCR SERPLBLD CKD-EPI 2021: >90 ML/MIN/1.73M2
ERYTHROCYTE [DISTWIDTH] IN BLOOD BY AUTOMATED COUNT: 14.5 % (ref 10–15)
GLUCOSE SERPL-MCNC: 68 MG/DL (ref 70–99)
HAV IGM SERPL QL IA: NONREACTIVE
HBV CORE IGM SERPL QL IA: NONREACTIVE
HBV SURFACE AG SERPL QL IA: NONREACTIVE
HCO3 SERPL-SCNC: 24 MMOL/L (ref 22–29)
HCT VFR BLD AUTO: 36.1 % (ref 35–47)
HCV AB SERPL QL IA: NONREACTIVE
HGB BLD-MCNC: 11.7 G/DL (ref 11.7–15.7)
LIPASE SERPL-CCNC: 113 U/L (ref 13–60)
MCH RBC QN AUTO: 26.9 PG (ref 26.5–33)
MCHC RBC AUTO-ENTMCNC: 32.4 G/DL (ref 31.5–36.5)
MCV RBC AUTO: 83 FL (ref 78–100)
PLATELET # BLD AUTO: 192 10E3/UL (ref 150–450)
POTASSIUM SERPL-SCNC: 3.7 MMOL/L (ref 3.4–5.3)
PROT SERPL-MCNC: 6.1 G/DL (ref 6.4–8.3)
RBC # BLD AUTO: 4.35 10E6/UL (ref 3.8–5.2)
SODIUM SERPL-SCNC: 137 MMOL/L (ref 135–145)
WBC # BLD AUTO: 7 10E3/UL (ref 4–11)

## 2024-08-16 PROCEDURE — 84450 TRANSFERASE (AST) (SGOT): CPT | Performed by: FAMILY MEDICINE

## 2024-08-16 PROCEDURE — 83690 ASSAY OF LIPASE: CPT | Performed by: FAMILY MEDICINE

## 2024-08-16 PROCEDURE — 85027 COMPLETE CBC AUTOMATED: CPT | Performed by: FAMILY MEDICINE

## 2024-08-16 PROCEDURE — 82150 ASSAY OF AMYLASE: CPT | Performed by: FAMILY MEDICINE

## 2024-08-16 PROCEDURE — 258N000003 HC RX IP 258 OP 636: Performed by: STUDENT IN AN ORGANIZED HEALTH CARE EDUCATION/TRAINING PROGRAM

## 2024-08-16 PROCEDURE — 36415 COLL VENOUS BLD VENIPUNCTURE: CPT | Performed by: FAMILY MEDICINE

## 2024-08-16 PROCEDURE — 250N000013 HC RX MED GY IP 250 OP 250 PS 637: Performed by: STUDENT IN AN ORGANIZED HEALTH CARE EDUCATION/TRAINING PROGRAM

## 2024-08-16 RX ORDER — ACETAMINOPHEN 325 MG/1
650 TABLET ORAL EVERY 4 HOURS PRN
COMMUNITY
Start: 2024-08-16

## 2024-08-16 RX ADMIN — FAMOTIDINE 20 MG: 20 TABLET ORAL at 09:09

## 2024-08-16 RX ADMIN — BUPROPION HYDROCHLORIDE 300 MG: 300 TABLET, EXTENDED RELEASE ORAL at 09:09

## 2024-08-16 RX ADMIN — SODIUM CHLORIDE, POTASSIUM CHLORIDE, SODIUM LACTATE AND CALCIUM CHLORIDE: 600; 310; 30; 20 INJECTION, SOLUTION INTRAVENOUS at 05:57

## 2024-08-16 ASSESSMENT — ACTIVITIES OF DAILY LIVING (ADL)
ADLS_ACUITY_SCORE: 22

## 2024-08-16 NOTE — PROGRESS NOTES
OB RN to do NST per shift per orders.   NST reactive. Denies contractions, none noted on strip. Baseline 135 with accelerations and moderate variability.   NST reviewed with Perla Rehman RN

## 2024-08-16 NOTE — PLAN OF CARE
Problem: Adult Inpatient Plan of Care  Goal: Plan of Care Review  Description: The Plan of Care Review/Shift note should be completed every shift.  The Outcome Evaluation is a brief statement about your assessment that the patient is improving, declining, or no change.  This information will be displayed automatically on your shift  note.  Outcome: Progressing  Flowsheets (Taken 8/16/2024 0205)  Plan of Care Reviewed With: patient  Goal: Optimal Comfort and Wellbeing  Outcome: Progressing     Problem: Pain Acute  Goal: Optimal Pain Control and Function  Outcome: Progressing   Goal Outcome Evaluation:      Plan of Care Reviewed With: patient      Patient alert and oriented x 4. Denied pain. PIV intact with infusing IV LR at 100 ml/hr. Tolerating clear liquids. No nausea.

## 2024-08-16 NOTE — PROGRESS NOTES
[facility] Progress Note     SUBJECTIVE:    The patient feels well this morning.  Denies any recurrent abdominal pain, nausea, vomiting.  She has been tolerating a clear liquid diet without difficulty.    OBJECTIVE:  BP 98/53 (BP Location: Left arm)   Pulse 70   Temp 98.1  F (36.7  C) (Oral)   Resp 18   Ht 1.524 m (5')   Wt 93.9 kg (207 lb)   LMP  (LMP Unknown)   SpO2 97%   BMI 40.43 kg/m    Temp (24hrs), Av  F (36.7  C), Min:97.7  F (36.5  C), Max:98.3  F (36.8  C)    Patient Vitals for the past 72 hrs:   Weight   08/15/24 0004 93.9 kg (207 lb)       Intake/Output Summary (Last 24 hours) at 2024 0807  Last data filed at 2024 0557  Gross per 24 hour   Intake  ml   Output --   Net  ml     PHYSICAL EXAM  GEN: Laying comfortably in bed  HRT: no LE edema  RESP: unlabored  ABD: Gravid abdomen, soft and nontender  SKIN: Visualized skin intact, no rash    Additional Data:  I have reviewed the patient's new clinical lab results:     Recent Labs   Lab Test 24  0736 08/15/24  0019 24  03423  1925 23  0711   WBC 7.0 9.3 13.3*   < > 8.7   HGB 11.7 13.2 12.4   < > 12.4   MCV 83 80 81   < > 86    253 237   < > 274   INR  --   --   --   --  1.01    < > = values in this interval not displayed.     Recent Labs   Lab Test 08/15/24  1137 08/15/24  0019 24  0341   POTASSIUM 4.1 3.6 4.3   CHLORIDE 105 102 101   CO2 23 19* 22   BUN 5.0* 4.5* 9.5   ANIONGAP 10 16* 13     Recent Labs   Lab Test 08/15/24  1137 08/15/24  0019 24  0341 23  1437 23  0644 23  1748 23  1157 23  0526   ALBUMIN 3.1* 3.6 3.5  --    < >  --    < >  --    BILITOTAL 0.7 2.2* 0.4  --    < >  --    < >  --    * 138* 49  --    < >  --    < >  --    * 150* 45  --    < >  --    < >  --    PROTEIN  --   --   --  Negative  --  Negative  --  20*   LIPASE 949* 1,390* 62*  --    < >  --    < >  --    AMYLASE 753* 296*  --   --   --   --   --   --      < > = values in this interval not displayed.     Imaging results:     MR Abdomen MRCP w/o contrast 8/14/2024  IMPRESSION:  1.  Prior cholecystectomy. Reservoir effect of the bile ducts. No choledocholithiasis.  2.  Unremarkable pancreas.  3.  Gravid uterus.  4.  Mild right hydronephrosis which may be physiologic related to pregnancy.     ASSESSMENT:    Amanda Miller is a 24 year old, 30 weeks 1 day pregnant, female with PMH cholecystitis complicated by pancreatitis s/p lap. Cholecystectomy (May 2023), recurrent elevated LFT's with normal EUS (June 2023), who presented to the ED early on 8/15 for epigastric pain and vomiting, who we were asked to see for acute pancreatitis.      Epigastric pain/pancreatitis: This is the patient's third episode of pancreatitis within about the last year.  Her first episode was associated with cholecystitis (May 2023) and thought to be secondary to gallstones.  She presented again a week later with similar symptoms to this admission, at which time her lipase was found to be elevated again.  Reviewed the endoscopic ultrasound done during that admission which was normal.  She has not had any issues up until the last couple of days.  ED visit 8/14 without any significant lab abnormalities and a normal MRCP.  Upon her representation 8/15, LFTs and lipase were found to be elevated compared to yesterday.  She was made n.p.o., and started on IV fluids.  Currently she is pain-free without analgesics and has not had any further nausea or vomiting episodes overnight.  Lipid panel was obtained and notable for elevations, but only in the low 100s.  Would expect these to be much higher if they were the cause of pancreatitis.  Also considered are gallstone pancreatitis, autoimmune pancreatitis, alcohol pancreatitis.  No stone seen on MRCP yesterday, but certainly still could be a gallstone present.  Will hold off on testing for autoimmune pancreatitis as it would not change the management in the  acute setting.  She does not drink alcohol.    -Lipase continues to downtrend, but LFT's continuing going up.  Recommend repeat LFT's early next week.   -Did well with clear liquids since yesterday, will advance diet to full liquids--->low fat as tolerated. Will plan to have her follow-up with one of our pancreatic specialists in the outpatient setting after delivery for further evaluation of recurrent episodes of pancreatitis.  30 weeks pregnant: The patient is being followed by family medicine/OB while in the hospital.     PLAN:  -Advance diet to full liquids--> low fat as tolerated  -Monitor for recurrent symptoms  -Repeat LFT's early next week with OB  -Follow-up with pancreatic specialist after delivery, NICO will call to schedule    (Dr. Billings)  [facility]  8/16/2024 8:07 AM  [phone] (office)    20 minutes of total time was spent providing patient care, including patient evaluation, reviewing documentation/test results, , and documentation.  ________________________________________________________________________

## 2024-08-16 NOTE — PLAN OF CARE
Pt denies pain. Was on a clear liq diet and tolerated this. Diet was advanced. Will discharge this afternoon pending how pt tolerated eating lunch. Up independently in room.     Pt tolerated lunch. Updated Dr. Bhatt about 2 low BP. Both BP were followed up by RN and BP were higher.     Problem: Adult Inpatient Plan of Care  Goal: Plan of Care Review  Description: The Plan of Care Review/Shift note should be completed every shift.  The Outcome Evaluation is a brief statement about your assessment that the patient is improving, declining, or no change.  This information will be displayed automatically on your shift  note.  Outcome: Progressing     Problem: Pain Acute  Goal: Optimal Pain Control and Function  Outcome: Progressing   Goal Outcome Evaluation:

## 2024-08-16 NOTE — PROGRESS NOTES
OB RN here to do NST per shift as ordered. Reactive NST obtained.  Patient denies feeling any contractions.  Baseline 135 with moderate variability. 15x15 accelerations, no decelerations present.    NST reviewed with Jessica Moritz, RN.

## 2024-08-16 NOTE — DISCHARGE SUMMARY
[facility] Discharge Summary    Amanda Miller MRN# [id number]   Age: 24 year old YOB: 2000     Date of Admission:  8/15/2024  Date of Discharge::  2024  Admitting Physician:  Carla Mims MD  Discharge Physician:  Gilda Bhatt MD    Home clinic: [facility]          Admission Diagnoses:   Acute pancreatitis, unspecified complication status, unspecified pancreatitis type [K85.90]          Discharge Diagnosis:     Patient Active Problem List    Diagnosis Date Noted    Acute pancreatitis, unspecified complication status, unspecified pancreatitis type 08/15/2024     Priority: Medium    High-risk pregnancy, third trimester 08/15/2024     Priority: Medium    Encounter for triage in pregnant patient 2024     Priority: Medium    Epigastric pain 2023     Priority: Medium    Acute biliary pancreatitis without infection or necrosis 2023     Priority: Medium    Cholelithiasis 2023     Priority: Medium    Cholecystitis 2023     Priority: Medium    Echogenic intracardiac focus of fetus on prenatal ultrasound 2022     Priority: Medium     Level II FAS noted  nml NIPS      Positive depression screening 2022     Priority: Medium     2022: EPDS 10/30, 0 #10  22: Declines repeat EPDS.  23: EPDS= 8/30, 0 to #10. ALL-7: 2.       At risk for venous thromboembolism (VTE) 2022     Priority: Medium     Pregravid BMI greater than 40      BMI 40.0-44.9, adult (H) 2022     Priority: Medium     Pre-pregnant BMI 43  Total weight gain recommended in pregnancy: 0 pounds    If prepregnant BMI >=40 - If pt has no other co-morbidities (personal history GDM,    with macrosomia, desires , history of shoulder dystocia, late to care, etc.) she may be   accepted into care. Those women who have one or more risk factors present at IOB must be   presented to CNM group in order to accept care.    Pt education on  health risks and lifestyle interventions   Health screening: hgb A1C   Recommend dating ultrasound (rationale: importance of accurate dating)   Recommend consultation with nutritionist.   Consider consult visit with consulting physician to review history and risk factors   Advise on recommended weight gain and review weight gain at each prenatal visit. If   patient exceeds recommended weight gain, or develops co-morbidities consider   transfer to OB management    and offer Level II US at 20 weeks with ECHO   Consider growth US at 28 and 32 weeks. May do growth US at 36 weeks if noting S:D   discrepancy.    and offer NST and BPP weekly starting at 34 weeks   Offer induction of labor at 39 weeks.   Advise on VTE prophylaxis recommendations      Depression 07/26/2021     Priority: Medium    Anxiety 07/26/2021     Priority: Medium             Procedures:     Lab Results   Component Value Date    WBC 7.0 08/16/2024    HGB 11.7 08/16/2024    HCT 36.1 08/16/2024     08/16/2024     08/16/2024    POTASSIUM 3.7 08/16/2024    CHLORIDE 102 08/16/2024    CO2 24 08/16/2024    BUN 4.1 (L) 08/16/2024    CR 0.61 08/16/2024    GLC 68 (L) 08/16/2024     (H) 08/16/2024     (H) 08/16/2024    ALKPHOS 194 (H) 08/16/2024    BILITOTAL 0.6 08/16/2024    INR 1.01 06/01/2023        Amylase   Date Value Ref Range Status   08/16/2024 164 (H) 28 - 100 U/L Final      Lipase   Date Value Ref Range Status   08/16/2024 113 (H) 13 - 60 U/L Final           Medications Prior to Admission:     Medications Prior to Admission   Medication Sig Dispense Refill Last Dose    buPROPion (WELLBUTRIN XL) 300 MG 24 hr tablet Take 300 mg by mouth every morning   8/14/2024 at AM    [START ON 8/21/2024] famotidine (PEPCID) 20 MG tablet Take 20 mg by mouth 2 times daily   8/13/2024 at 08/13/24    Feeding Supplies (AMEDA MILK STORAGE BAGS) MISC 150 Units as needed (for storage of expressed/pumped breast milk) 100 each 3 Unknown  at unknown    [DISCONTINUED] CHOLINE BITARTRATE PO Take 800 mg by mouth daily   2024 at AM    [DISCONTINUED] sucralfate (CARAFATE) 1 GM tablet Take 1 tablet (1 g) by mouth 4 times daily for 7 days 28 tablet 0 2024 at 2130 (2nd dose)             Discharge Medications:     Current Discharge Medication List        START taking these medications    Details   acetaminophen (TYLENOL) 325 MG tablet Take 2 tablets (650 mg) by mouth every 4 hours as needed for mild pain or other (and adjunct with moderate or severe pain or per patient request)    Associated Diagnoses: Acute biliary pancreatitis without infection or necrosis           CONTINUE these medications which have NOT CHANGED    Details   buPROPion (WELLBUTRIN XL) 300 MG 24 hr tablet Take 300 mg by mouth every morning      famotidine (PEPCID) 20 MG tablet Take 20 mg by mouth 2 times daily      Feeding Supplies (AMEDA MILK STORAGE BAGS) MISC 150 Units as needed (for storage of expressed/pumped breast milk)  Qty: 100 each, Refills: 3    Associated Diagnoses: Encounter for supervision of normal first pregnancy in third trimester           STOP taking these medications       CHOLINE BITARTRATE PO Comments:   Reason for Stopping:         sucralfate (CARAFATE) 1 GM tablet Comments:   Reason for Stopping:                     Consultations:   Consultation during this admission received from gastroenterology          Brief History of Illness:   25 yo  at 31w2d gestation presented to ER with acute epigastric abdominal pain with nausea.  Initially evaluated  in ER and discharged on sucralfate, returned 8/15 with persistent and worsening symptoms.  Amylase and lipase elevated and pt admitted with acute pancreatitis.       Hospital Course:   History of cholecystectomy and no evidence on MRCP of persistent stone. Pain resolved and diet being gradually advance.  Amylase and lipase going down with persistent mild elevation of LFTs on discharge.    GI consulted and  plan consult with pancreatic specialist as outpatient.    Routine NSTs reassuring, no contractions.  No indication of HELLP or pregnancy related conditions contributing however needs close follow up. BP normal throughout hospitalization. No headaches, edema or vision changes          Discharge Instructions and Follow-Up:     Discharge diet: Advance to a regular diet as tolerated   Discharge activity: Activity as tolerated   Discharge follow-up: Follow up with primary care provider in 3-4 days           Discharge Disposition:     Discharged to home          Gilda Bhatt MD

## 2024-09-26 LAB — GROUP B STREPTOCOCCUS (EXTERNAL): NEGATIVE

## 2024-10-08 ENCOUNTER — ANESTHESIA (OUTPATIENT)
Dept: OBGYN | Facility: CLINIC | Age: 24
End: 2024-10-08
Payer: COMMERCIAL

## 2024-10-08 ENCOUNTER — HOSPITAL ENCOUNTER (INPATIENT)
Facility: CLINIC | Age: 24
LOS: 3 days | Discharge: HOME | End: 2024-10-11
Attending: STUDENT IN AN ORGANIZED HEALTH CARE EDUCATION/TRAINING PROGRAM | Admitting: STUDENT IN AN ORGANIZED HEALTH CARE EDUCATION/TRAINING PROGRAM
Payer: COMMERCIAL

## 2024-10-08 ENCOUNTER — ANESTHESIA EVENT (OUTPATIENT)
Dept: OBGYN | Facility: CLINIC | Age: 24
End: 2024-10-08
Payer: COMMERCIAL

## 2024-10-08 DIAGNOSIS — K85.10 ACUTE BILIARY PANCREATITIS WITHOUT INFECTION OR NECROSIS: ICD-10-CM

## 2024-10-08 PROBLEM — Z34.90 PREGNANT: Status: ACTIVE | Noted: 2024-10-08

## 2024-10-08 LAB
ABO/RH(D): NORMAL
ANTIBODY SCREEN: NEGATIVE
HGB BLD-MCNC: 13.4 G/DL (ref 11.7–15.7)
SPECIMEN EXPIRATION DATE: NORMAL

## 2024-10-08 PROCEDURE — 36415 COLL VENOUS BLD VENIPUNCTURE: CPT | Performed by: STUDENT IN AN ORGANIZED HEALTH CARE EDUCATION/TRAINING PROGRAM

## 2024-10-08 PROCEDURE — 370N000003 HC ANESTHESIA WARD SERVICE: Performed by: ANESTHESIOLOGY

## 2024-10-08 PROCEDURE — 250N000011 HC RX IP 250 OP 636: Performed by: ANESTHESIOLOGY

## 2024-10-08 PROCEDURE — 85018 HEMOGLOBIN: CPT | Performed by: STUDENT IN AN ORGANIZED HEALTH CARE EDUCATION/TRAINING PROGRAM

## 2024-10-08 PROCEDURE — 86900 BLOOD TYPING SEROLOGIC ABO: CPT | Performed by: STUDENT IN AN ORGANIZED HEALTH CARE EDUCATION/TRAINING PROGRAM

## 2024-10-08 PROCEDURE — 86780 TREPONEMA PALLIDUM: CPT | Performed by: STUDENT IN AN ORGANIZED HEALTH CARE EDUCATION/TRAINING PROGRAM

## 2024-10-08 PROCEDURE — 86850 RBC ANTIBODY SCREEN: CPT | Performed by: STUDENT IN AN ORGANIZED HEALTH CARE EDUCATION/TRAINING PROGRAM

## 2024-10-08 PROCEDURE — 120N000001 HC R&B MED SURG/OB

## 2024-10-08 RX ORDER — CITRIC ACID/SODIUM CITRATE 334-500MG
30 SOLUTION, ORAL ORAL
Status: DISCONTINUED | OUTPATIENT
Start: 2024-10-08 | End: 2024-10-09 | Stop reason: HOSPADM

## 2024-10-08 RX ORDER — KETOROLAC TROMETHAMINE 30 MG/ML
30 INJECTION, SOLUTION INTRAMUSCULAR; INTRAVENOUS
Status: DISCONTINUED | OUTPATIENT
Start: 2024-10-08 | End: 2024-10-11 | Stop reason: HOSPADM

## 2024-10-08 RX ORDER — TRANEXAMIC ACID 10 MG/ML
1 INJECTION, SOLUTION INTRAVENOUS EVERY 30 MIN PRN
Status: DISCONTINUED | OUTPATIENT
Start: 2024-10-08 | End: 2024-10-09 | Stop reason: HOSPADM

## 2024-10-08 RX ORDER — ACETAMINOPHEN 325 MG/1
650 TABLET ORAL EVERY 4 HOURS PRN
Status: DISCONTINUED | OUTPATIENT
Start: 2024-10-08 | End: 2024-10-09 | Stop reason: HOSPADM

## 2024-10-08 RX ORDER — OXYTOCIN 10 [USP'U]/ML
10 INJECTION, SOLUTION INTRAMUSCULAR; INTRAVENOUS
Status: DISCONTINUED | OUTPATIENT
Start: 2024-10-08 | End: 2024-10-11 | Stop reason: HOSPADM

## 2024-10-08 RX ORDER — NALOXONE HYDROCHLORIDE 0.4 MG/ML
0.4 INJECTION, SOLUTION INTRAMUSCULAR; INTRAVENOUS; SUBCUTANEOUS
Status: DISCONTINUED | OUTPATIENT
Start: 2024-10-08 | End: 2024-10-09 | Stop reason: HOSPADM

## 2024-10-08 RX ORDER — MISOPROSTOL 200 UG/1
400 TABLET ORAL
Status: DISCONTINUED | OUTPATIENT
Start: 2024-10-08 | End: 2024-10-09 | Stop reason: HOSPADM

## 2024-10-08 RX ORDER — LOPERAMIDE HYDROCHLORIDE 2 MG/1
4 CAPSULE ORAL
Status: DISCONTINUED | OUTPATIENT
Start: 2024-10-08 | End: 2024-10-09 | Stop reason: HOSPADM

## 2024-10-08 RX ORDER — METOCLOPRAMIDE 10 MG/1
10 TABLET ORAL EVERY 6 HOURS PRN
Status: DISCONTINUED | OUTPATIENT
Start: 2024-10-08 | End: 2024-10-09 | Stop reason: HOSPADM

## 2024-10-08 RX ORDER — ONDANSETRON 2 MG/ML
4 INJECTION INTRAMUSCULAR; INTRAVENOUS EVERY 6 HOURS PRN
Status: DISCONTINUED | OUTPATIENT
Start: 2024-10-08 | End: 2024-10-09 | Stop reason: HOSPADM

## 2024-10-08 RX ORDER — NALBUPHINE HYDROCHLORIDE 10 MG/ML
2.5-5 INJECTION INTRAMUSCULAR; INTRAVENOUS; SUBCUTANEOUS EVERY 6 HOURS PRN
Status: DISCONTINUED | OUTPATIENT
Start: 2024-10-08 | End: 2024-10-11 | Stop reason: HOSPADM

## 2024-10-08 RX ORDER — LOPERAMIDE HYDROCHLORIDE 2 MG/1
2 CAPSULE ORAL
Status: DISCONTINUED | OUTPATIENT
Start: 2024-10-08 | End: 2024-10-09 | Stop reason: HOSPADM

## 2024-10-08 RX ORDER — NALOXONE HYDROCHLORIDE 0.4 MG/ML
0.2 INJECTION, SOLUTION INTRAMUSCULAR; INTRAVENOUS; SUBCUTANEOUS
Status: DISCONTINUED | OUTPATIENT
Start: 2024-10-08 | End: 2024-10-09 | Stop reason: HOSPADM

## 2024-10-08 RX ORDER — IBUPROFEN 800 MG/1
800 TABLET, FILM COATED ORAL
Status: DISCONTINUED | OUTPATIENT
Start: 2024-10-08 | End: 2024-10-11 | Stop reason: HOSPADM

## 2024-10-08 RX ORDER — FENTANYL CITRATE-0.9 % NACL/PF 10 MCG/ML
100 PLASTIC BAG, INJECTION (ML) INTRAVENOUS EVERY 5 MIN PRN
Status: DISCONTINUED | OUTPATIENT
Start: 2024-10-08 | End: 2024-10-09 | Stop reason: HOSPADM

## 2024-10-08 RX ORDER — OXYTOCIN 10 [USP'U]/ML
10 INJECTION, SOLUTION INTRAMUSCULAR; INTRAVENOUS
Status: DISCONTINUED | OUTPATIENT
Start: 2024-10-08 | End: 2024-10-09 | Stop reason: HOSPADM

## 2024-10-08 RX ORDER — CARBOPROST TROMETHAMINE 250 UG/ML
250 INJECTION, SOLUTION INTRAMUSCULAR
Status: DISCONTINUED | OUTPATIENT
Start: 2024-10-08 | End: 2024-10-09 | Stop reason: HOSPADM

## 2024-10-08 RX ORDER — OXYTOCIN/0.9 % SODIUM CHLORIDE 30/500 ML
340 PLASTIC BAG, INJECTION (ML) INTRAVENOUS CONTINUOUS PRN
Status: DISCONTINUED | OUTPATIENT
Start: 2024-10-08 | End: 2024-10-09 | Stop reason: HOSPADM

## 2024-10-08 RX ORDER — METHYLERGONOVINE MALEATE 0.2 MG/ML
200 INJECTION INTRAVENOUS
Status: DISCONTINUED | OUTPATIENT
Start: 2024-10-08 | End: 2024-10-09 | Stop reason: HOSPADM

## 2024-10-08 RX ORDER — ONDANSETRON 4 MG/1
4 TABLET, ORALLY DISINTEGRATING ORAL EVERY 6 HOURS PRN
Status: DISCONTINUED | OUTPATIENT
Start: 2024-10-08 | End: 2024-10-09 | Stop reason: HOSPADM

## 2024-10-08 RX ORDER — FENTANYL/ROPIVACAINE/NS/PF 2MCG/ML-.1
PLASTIC BAG, INJECTION (ML) EPIDURAL
Status: DISCONTINUED | OUTPATIENT
Start: 2024-10-09 | End: 2024-10-09 | Stop reason: HOSPADM

## 2024-10-08 RX ORDER — PROCHLORPERAZINE MALEATE 10 MG
10 TABLET ORAL EVERY 6 HOURS PRN
Status: DISCONTINUED | OUTPATIENT
Start: 2024-10-08 | End: 2024-10-09 | Stop reason: HOSPADM

## 2024-10-08 RX ORDER — OXYTOCIN/0.9 % SODIUM CHLORIDE 30/500 ML
100-340 PLASTIC BAG, INJECTION (ML) INTRAVENOUS CONTINUOUS PRN
Status: DISCONTINUED | OUTPATIENT
Start: 2024-10-08 | End: 2024-10-11 | Stop reason: HOSPADM

## 2024-10-08 RX ORDER — METOCLOPRAMIDE HYDROCHLORIDE 5 MG/ML
10 INJECTION INTRAMUSCULAR; INTRAVENOUS EVERY 6 HOURS PRN
Status: DISCONTINUED | OUTPATIENT
Start: 2024-10-08 | End: 2024-10-09 | Stop reason: HOSPADM

## 2024-10-08 RX ORDER — FENTANYL CITRATE 50 UG/ML
100 INJECTION, SOLUTION INTRAMUSCULAR; INTRAVENOUS
Status: DISCONTINUED | OUTPATIENT
Start: 2024-10-08 | End: 2024-10-09 | Stop reason: HOSPADM

## 2024-10-08 RX ORDER — MISOPROSTOL 200 UG/1
800 TABLET ORAL
Status: DISCONTINUED | OUTPATIENT
Start: 2024-10-08 | End: 2024-10-09 | Stop reason: HOSPADM

## 2024-10-08 RX ADMIN — Medication 10 ML: at 00:07

## 2024-10-08 ASSESSMENT — ACTIVITIES OF DAILY LIVING (ADL)
ADLS_ACUITY_SCORE: 22

## 2024-10-09 LAB — T PALLIDUM AB SER QL: NONREACTIVE

## 2024-10-09 PROCEDURE — 722N000001 HC LABOR CARE VAGINAL DELIVERY SINGLE

## 2024-10-09 PROCEDURE — 250N000011 HC RX IP 250 OP 636: Performed by: STUDENT IN AN ORGANIZED HEALTH CARE EDUCATION/TRAINING PROGRAM

## 2024-10-09 PROCEDURE — 250N000011 HC RX IP 250 OP 636: Performed by: ANESTHESIOLOGY

## 2024-10-09 PROCEDURE — 250N000013 HC RX MED GY IP 250 OP 250 PS 637: Performed by: STUDENT IN AN ORGANIZED HEALTH CARE EDUCATION/TRAINING PROGRAM

## 2024-10-09 PROCEDURE — 00HU33Z INSERTION OF INFUSION DEVICE INTO SPINAL CANAL, PERCUTANEOUS APPROACH: ICD-10-PCS | Performed by: ANESTHESIOLOGY

## 2024-10-09 PROCEDURE — 0UQMXZZ REPAIR VULVA, EXTERNAL APPROACH: ICD-10-PCS | Performed by: STUDENT IN AN ORGANIZED HEALTH CARE EDUCATION/TRAINING PROGRAM

## 2024-10-09 PROCEDURE — 3E0R3BZ INTRODUCTION OF ANESTHETIC AGENT INTO SPINAL CANAL, PERCUTANEOUS APPROACH: ICD-10-PCS | Performed by: ANESTHESIOLOGY

## 2024-10-09 PROCEDURE — 250N000009 HC RX 250: Performed by: STUDENT IN AN ORGANIZED HEALTH CARE EDUCATION/TRAINING PROGRAM

## 2024-10-09 PROCEDURE — 120N000001 HC R&B MED SURG/OB

## 2024-10-09 RX ORDER — ACETAMINOPHEN 325 MG/1
650 TABLET ORAL EVERY 4 HOURS PRN
Status: DISCONTINUED | OUTPATIENT
Start: 2024-10-09 | End: 2024-10-11 | Stop reason: HOSPADM

## 2024-10-09 RX ORDER — DOCUSATE SODIUM 100 MG/1
100 CAPSULE, LIQUID FILLED ORAL DAILY
Status: DISCONTINUED | OUTPATIENT
Start: 2024-10-09 | End: 2024-10-11 | Stop reason: HOSPADM

## 2024-10-09 RX ORDER — BISACODYL 10 MG
10 SUPPOSITORY, RECTAL RECTAL DAILY PRN
Status: DISCONTINUED | OUTPATIENT
Start: 2024-10-09 | End: 2024-10-11 | Stop reason: HOSPADM

## 2024-10-09 RX ORDER — MODIFIED LANOLIN
OINTMENT (GRAM) TOPICAL
Status: DISCONTINUED | OUTPATIENT
Start: 2024-10-09 | End: 2024-10-11 | Stop reason: HOSPADM

## 2024-10-09 RX ORDER — BUPROPION HYDROCHLORIDE 300 MG/1
300 TABLET ORAL DAILY
Status: DISCONTINUED | OUTPATIENT
Start: 2024-10-09 | End: 2024-10-11 | Stop reason: HOSPADM

## 2024-10-09 RX ORDER — IBUPROFEN 800 MG/1
800 TABLET, FILM COATED ORAL EVERY 6 HOURS PRN
Status: DISCONTINUED | OUTPATIENT
Start: 2024-10-09 | End: 2024-10-11 | Stop reason: HOSPADM

## 2024-10-09 RX ORDER — HYDROCORTISONE 25 MG/G
CREAM TOPICAL 3 TIMES DAILY PRN
Status: DISCONTINUED | OUTPATIENT
Start: 2024-10-09 | End: 2024-10-11 | Stop reason: HOSPADM

## 2024-10-09 RX ORDER — OXYTOCIN/0.9 % SODIUM CHLORIDE 30/500 ML
340 PLASTIC BAG, INJECTION (ML) INTRAVENOUS CONTINUOUS PRN
Status: DISCONTINUED | OUTPATIENT
Start: 2024-10-09 | End: 2024-10-11 | Stop reason: HOSPADM

## 2024-10-09 RX ORDER — MISOPROSTOL 200 UG/1
800 TABLET ORAL
Status: DISCONTINUED | OUTPATIENT
Start: 2024-10-09 | End: 2024-10-11 | Stop reason: HOSPADM

## 2024-10-09 RX ORDER — METHYLERGONOVINE MALEATE 0.2 MG/ML
200 INJECTION INTRAVENOUS
Status: DISCONTINUED | OUTPATIENT
Start: 2024-10-09 | End: 2024-10-11 | Stop reason: HOSPADM

## 2024-10-09 RX ORDER — OXYTOCIN 10 [USP'U]/ML
10 INJECTION, SOLUTION INTRAMUSCULAR; INTRAVENOUS
Status: DISCONTINUED | OUTPATIENT
Start: 2024-10-09 | End: 2024-10-11 | Stop reason: HOSPADM

## 2024-10-09 RX ORDER — LOPERAMIDE HYDROCHLORIDE 2 MG/1
2 CAPSULE ORAL
Status: DISCONTINUED | OUTPATIENT
Start: 2024-10-09 | End: 2024-10-11 | Stop reason: HOSPADM

## 2024-10-09 RX ORDER — TRANEXAMIC ACID 10 MG/ML
1 INJECTION, SOLUTION INTRAVENOUS EVERY 30 MIN PRN
Status: DISCONTINUED | OUTPATIENT
Start: 2024-10-09 | End: 2024-10-11 | Stop reason: HOSPADM

## 2024-10-09 RX ORDER — CARBOPROST TROMETHAMINE 250 UG/ML
250 INJECTION, SOLUTION INTRAMUSCULAR
Status: DISCONTINUED | OUTPATIENT
Start: 2024-10-09 | End: 2024-10-11 | Stop reason: HOSPADM

## 2024-10-09 RX ORDER — MISOPROSTOL 200 UG/1
400 TABLET ORAL
Status: DISCONTINUED | OUTPATIENT
Start: 2024-10-09 | End: 2024-10-11 | Stop reason: HOSPADM

## 2024-10-09 RX ORDER — LOPERAMIDE HYDROCHLORIDE 2 MG/1
4 CAPSULE ORAL
Status: DISCONTINUED | OUTPATIENT
Start: 2024-10-09 | End: 2024-10-11 | Stop reason: HOSPADM

## 2024-10-09 RX ADMIN — NALBUPHINE HYDROCHLORIDE 2.5 MG: 10 INJECTION, SOLUTION INTRAMUSCULAR; INTRAVENOUS; SUBCUTANEOUS at 09:26

## 2024-10-09 RX ADMIN — NALBUPHINE HYDROCHLORIDE 2.5 MG: 10 INJECTION, SOLUTION INTRAMUSCULAR; INTRAVENOUS; SUBCUTANEOUS at 03:15

## 2024-10-09 RX ADMIN — BUPROPION HYDROCHLORIDE 300 MG: 300 TABLET, EXTENDED RELEASE ORAL at 09:26

## 2024-10-09 RX ADMIN — Medication: at 00:03

## 2024-10-09 RX ADMIN — ACETAMINOPHEN 650 MG: 325 TABLET ORAL at 14:37

## 2024-10-09 RX ADMIN — DOCUSATE SODIUM 100 MG: 100 CAPSULE, LIQUID FILLED ORAL at 09:26

## 2024-10-09 RX ADMIN — Medication 340 ML/HR: at 04:16

## 2024-10-09 RX ADMIN — IBUPROFEN 800 MG: 800 TABLET, FILM COATED ORAL at 23:37

## 2024-10-09 RX ADMIN — METOCLOPRAMIDE HYDROCHLORIDE 10 MG: 5 INJECTION INTRAMUSCULAR; INTRAVENOUS at 02:46

## 2024-10-09 ASSESSMENT — ACTIVITIES OF DAILY LIVING (ADL)
ADLS_ACUITY_SCORE: 22

## 2024-10-09 NOTE — PROGRESS NOTES
This RN in the chart due to utilization review. RN called at [facility] form [facility] to seek more information on why the patient didn't have an inpatient order. This RN accessed this chart due to the Utilization Reviewer needing more information and stating that this patient had delivered on our unit. Through review of this chart it was identified that the patient is at [facility] and not [facility] Notified Utilization Review and she will follow up on this.

## 2024-10-09 NOTE — ANESTHESIA PREPROCEDURE EVALUATION
"Anesthesia Pre-Procedure Evaluation    Patient: Amanda Miller   MRN: [id number] : 2000        Procedure :           Past Medical History:   Diagnosis Date    Anxiety     Depression     Depressive disorder     no medications currently, per pt \"midwife would like me to start again after pregnancy\"    Obesity     Urinary tract infection       Past Surgical History:   Procedure Laterality Date    CHOLANGIOGRAM N/A 2023    Procedure: INTRAOPERATIVE CHOLANGIOGRAMS;  Surgeon: Dimas Cloud DO;  Location: [facility] OR    ESOPHAGOSCOPY, GASTROSCOPY, DUODENOSCOPY (EGD), COMBINED N/A 2023    Procedure: ENDOSCOPIC ULTRASOUND;  Surgeon: Eleuterio Kline MD;  Location: [facility] OR    LAPAROSCOPIC CHOLECYSTECTOMY N/A 2023    Procedure: CHOLECYSTECTOMY, ROBOT-ASSISTED, LAPAROSCOPIC, USING DA LINDSAY XI,;  Surgeon: Dimas Cloud DO;  Location: [facility] OR    TONSILLECTOMY      WISDOM TOOTH EXTRACTION        No Known Allergies   Social History     Tobacco Use    Smoking status: Never     Passive exposure: Never    Smokeless tobacco: Never   Substance Use Topics    Alcohol use: Not Currently      Wt Readings from Last 1 Encounters:   10/08/24 93.9 kg (207 lb)        Anesthesia Evaluation        No history of anesthetic complications       ROS/MED HX  ENT/Pulmonary:  - neg pulmonary ROS     Neurologic:  - neg neurologic ROS     Cardiovascular:  - neg cardiovascular ROS     METS/Exercise Tolerance:     Hematologic:  - neg hematologic  ROS     Musculoskeletal:       GI/Hepatic:  - neg GI/hepatic ROS     Renal/Genitourinary:       Endo:     (+)               Obesity,       Psychiatric/Substance Use:       Infectious Disease:       Malignancy:       Other:            Physical Exam    Airway          Neck ROM: full   Mouth opening: > 3 cm    Respiratory Devices and Support         Dental           Cardiovascular   cardiovascular exam normal          Pulmonary   pulmonary exam normal                OUTSIDE " LABS:  CBC:   Lab Results   Component Value Date    WBC 7.0 08/16/2024    WBC 9.3 08/15/2024    HGB 13.4 10/08/2024    HGB 11.7 08/16/2024    HCT 36.1 08/16/2024    HCT 39.2 08/15/2024     08/16/2024     08/15/2024     BMP:   Lab Results   Component Value Date     08/16/2024     08/15/2024    POTASSIUM 3.7 08/16/2024    POTASSIUM 4.1 08/15/2024    CHLORIDE 102 08/16/2024    CHLORIDE 105 08/15/2024    CO2 24 08/16/2024    CO2 23 08/15/2024    BUN 4.1 (L) 08/16/2024    BUN 5.0 (L) 08/15/2024    CR 0.61 08/16/2024    CR 0.61 08/15/2024    GLC 68 (L) 08/16/2024    GLC 69 (L) 08/15/2024     COAGS:   Lab Results   Component Value Date    INR 1.01 06/01/2023     POC:   Lab Results   Component Value Date    HCG Negative 05/31/2023    HCGS Positive (A) 01/02/2024     HEPATIC:   Lab Results   Component Value Date    ALBUMIN 3.2 (L) 08/16/2024    PROTTOTAL 6.1 (L) 08/16/2024     (H) 08/16/2024     (H) 08/16/2024    ALKPHOS 194 (H) 08/16/2024    BILITOTAL 0.6 08/16/2024     OTHER:   Lab Results   Component Value Date    TINA 8.9 08/16/2024    LIPASE 113 (H) 08/16/2024    AMYLASE 164 (H) 08/16/2024       Anesthesia Plan    ASA Status:  2       Anesthesia Type: Epidural.              Consents    Anesthesia Plan(s) and associated risks, benefits, and realistic alternatives discussed. Questions answered and patient/representative(s) expressed understanding.     - Discussed:     - Discussed with:  Patient, Spouse            Postoperative Care            Comments:           neg OB ROS.      Juanito Ruby MD    I have reviewed the pertinent notes and labs in the chart from the past 30 days and (re)examined the patient.  Any updates or changes from those notes are reflected in this note.

## 2024-10-09 NOTE — PLAN OF CARE
Problem: Labor  Goal: Absence of Infection Signs and Symptoms  Outcome: Progressing  Goal: Acceptable Pain Control  Outcome: Progressing   Goal Outcome Evaluation: patient a/o, vitally stable on room air. Bonding well with . Attempting to breast feed baby. Baby eating express colostrum. Pain being controlled with tylenol. PIV removed per patient request. Fundus is firm 1 below U. Bleeding is light, no clots noted. Vaginal hematoma being managed with ice pack application. Voiding spontaneously. Tolerating a regular diet. Denies pre eclampsia symptoms. Independent with activity.

## 2024-10-09 NOTE — ANESTHESIA PROCEDURE NOTES
"Epidural catheter Procedure Note    Pre-Procedure   Staff -        Anesthesiologist:  Juanito Ruby MD       Performed By: anesthesiologist       Location: OB       Procedure Start/Stop Times: 10/8/2024 11:55 PM and 10/9/2024 12:12 AM       Pre-Anesthestic Checklist: patient identified, IV checked, risks and benefits discussed, informed consent, monitors and equipment checked, pre-op evaluation and at physician/surgeon's request  Timeout:       Correct Patient: Yes        Correct Procedure: Yes        Correct Site: Yes        Correct Position: Yes   Procedure Documentation  Procedure: epidural catheter       Diagnosis: Labor       Patient Position: sitting       Patient Prep/Sterile Barriers: sterile gloves, mask, patient draped       Skin prep: Chloraprep       Local skin infiltrated with mL of 1% lidocaine.        Insertion Site: L3-4. (midline approach).       Technique: LORT saline        CHRISTO at 6 cm.       Needle Type: ToLiveRaily needle       Needle Gauge: 17.        Needle Length (Inches): 3.5        Catheter: 19 G.          Catheter threaded easily.           Threaded 12 cm at skin.         # of attempts: 1 and  # of redirects:     Assessment/Narrative         Paresthesias: No.       Test dose of 3 mL lidocaine 1.5% w/ 1:200,000 epinephrine at 00:04 CDT.         Test dose negative, 3 minutes after injection, for signs of intravascular, subdural, or intrathecal injection.       Insertion/Infusion Method: LORT saline       Aspiration negative for Heme or CSF via Epidural Catheter.    Medication(s) Administered   0.125% bupivacaine (Epidural) - EPIDURAL   10 mL - 10/8/2024 12:07:00 AM  Medication Administration Time: 10/8/2024 11:55 PM      FOR [facility] ([facility] [place] ONLY:   Pain Team Contact information: please page the Pain Team Via [employer]. Search \"Pain\". During daytime hours, please page the attending first. At night please page the resident first.      "

## 2024-10-09 NOTE — PROGRESS NOTES
Talked to Dr. Mims about pt higher PPMA score, pt has already been taking Wellbutrin XL 300mg daily. Order to put that in and start this am.     Diana Lugo RN

## 2024-10-09 NOTE — PHARMACY-ADMISSION MEDICATION HISTORY
Pharmacist Admission Medication History    Admission medication history is complete. The information provided in this note is only as accurate as the sources available at the time of the update.    Information Source(s): Patient and CareEverywhere/SureScripts via phone    Pertinent Information: Patient only takes Wellbutrin 300mg XL Qam and famotidine 20mg bid. Last taken 10/7    Allergies reviewed with patient and updates made in EHR: yes    Medication History Completed By: Gerda Petersen RP 10/9/2024 9:06 AM    PTA Med List   Medication Sig Last Dose    acetaminophen (TYLENOL) 325 MG tablet Take 2 tablets (650 mg) by mouth every 4 hours as needed for mild pain or other (and adjunct with moderate or severe pain or per patient request) Unknown    buPROPion (WELLBUTRIN XL) 300 MG 24 hr tablet Take 300 mg by mouth every morning 10/7/2024 at AM    famotidine (PEPCID) 20 MG tablet Take 20 mg by mouth 2 times daily Past Week

## 2024-10-09 NOTE — LACTATION NOTE
Lactation consult ordered to assist with latching.  This is Amanda's second baby, her first is 18 months old and wouldn't latch at birth and mom exclusively pumped and bottled for 1 year.  This baby is now 8 hours old and hasn't been interested in latching and is very sleepy.  They've been hand expressing and giving it to baby with a syringe/spoon totaling about 3 mls.      Amanda has been pumping and collecting colostrum for 3 weeks and was getting 0.5-3 mls each time.  She can easily hand express colostrum while I assessed baby's suck with my gloved finger.  Baby does have a strong suck when he sucks, but needs a lot of encouragement and stimulation.  After a couple attempts to latch using NS, mom bottled baby 3 mls of colostrum.  I assisted mom with hand expressing into a colostrum catcher for later use, collecting approx 2 mls.  We discussed using a bottle to teach baby how to suck vs dropping it in his mouth with a syringe.      Amanda has a couple breast pumps at home and knows how to use them.  She plans to follow up for lactation with Dr. Prasanna VIRGEN.  She will call next time baby wakes to feed to work on latching.     1515-  Baby under warmer and sleepy.  We initiated pumping using Amanda's own Unimom pump, nipples measured at R-17mm and L-16mm.  Amanda has 16mm inserts, I gave her 19mm inserts.  She pumped for 4-5 minutes not getting anything, so switched to hand expression and was able to get 3 mls that we attempted to bottle baby, but he would suck, so spoon fed.      Discussed plan of care for the night:  once baby is warm, keep him in t-shirt, swaddle and hat to maintain temps.  Attempt breastfeeding as baby cues, continue to hand express every 3 hours and feed to baby via bottle/spoon.  Will follow up tomorrow.

## 2024-10-09 NOTE — ANESTHESIA POSTPROCEDURE EVALUATION
Patient: Amanda Miller    Procedure: * No procedures listed *       Anesthesia Type:  Epidural    Note:     Postop Pain Control: Uneventful            Sign Out: Well controlled pain   PONV: No   Neuro/Psych: Uneventful            Sign Out: Acceptable/Baseline neuro status   Airway/Respiratory: Uneventful            Sign Out: Acceptable/Baseline resp. status   CV/Hemodynamics: Uneventful            Sign Out: Acceptable CV status; No obvious hypovolemia; No obvious fluid overload   Other NRE: NONE   DID A NON-ROUTINE EVENT OCCUR? No    Event details/Postop Comments:  Denies headache, neuro issues. Walking around without issue or concerns. All questions answered.              Last vitals:  Vitals:    10/09/24 0535 10/09/24 0550 10/09/24 0604   BP: 98/60 104/57 97/56   Pulse:      Resp:      Temp:      SpO2:          Electronically Signed By: Peter Mccullough MD  October 9, 2024  9:29 AM

## 2024-10-09 NOTE — H&P
OBSTETRICS ADMISSION HISTORY & PHYSICAL  DATE OF ADMISSION: 10/8/2024  4:11 PM        Assessment and Plan:   Assessment:  Amanda Miller is a 24 year old  at 38w6d Membranes are ruptured at 1420 (10/8/24).  Will allow normal labor to continue for now.   Patient Active Problem List   Diagnosis    Depression    Anxiety    At risk for venous thromboembolism (VTE)    BMI 40.0-44.9, adult (H)    Positive depression screening    Echogenic intracardiac focus of fetus on prenatal ultrasound    Acute biliary pancreatitis without infection or necrosis    Cholelithiasis    Cholecystitis    Epigastric pain    Encounter for triage in pregnant patient    Acute pancreatitis, unspecified complication status, unspecified pancreatitis type    High-risk pregnancy, third trimester    Pregnant        PLAN:   Admit - see IP orders  GBS: negative. Antibiotics are not indicated   Comfort plan: Patient interested in epidural  Consider Pitocin for augmentation  Will monitor labor progress along with RN and update attending physician  Will notify Dr Mims    Patient discussed with attending physician, Dr. Carla Mims  , who agrees with the plan.     Frances Jaffe MD PGY-1,  10/8/2024  [facility]         Chief Complaint:     SROM       History of Present Illness:     Amanda Miller is a 24 year old year old  at 38w6d with SROM at around 1420 today.   Presents to the [facility] for SROM.    She reports regular contractions.She reports fluid leakage. She denies bleeding per vagina. Fetal movement is normal.    Her prenatal course has been complicated by pancreatitis at around 30 weeks of geatstion. .    Prenatal labs   Lab Results   Component Value Date    AS Negative 10/08/2024    HEPBANG Nonreactive 08/15/2024    CHPCRT Negative 2022    GCPCRT Negative 2022    HGB 13.4 10/08/2024    GBS Negative 2024       GBS was collected on 24.  Weight  "gain during pregnancy: /Not found.         Obstetrical History:     OB History    Para Term  AB Living   3 1 1 0 1 1   SAB IAB Ectopic Multiple Live Births   1 0 0 0 1      # Outcome Date GA Lbr Carlos/2nd Weight Sex Type Anes PTL Lv   3 Current            2 SAB 2024 8w0d    SAB      1 Term 23 40w5d 08:37 / 00:29 3.1 kg (6 lb 13.4 oz) F Vag-Spont EPI N JEREMY      Name: TAYLOR ARCOS-ANDERS      Apgar1: 9  Apgar5: 9              Immunzations:     Most Recent Immunizations   Administered Date(s) Administered    COVID-19 MONOVALENT 12+ (Pfizer) 2021    DTAP (<7y) 2004    DTaP, Unspecified 2004    HEPATITIS A (PEDS 12M-18Y) 2007    HIB(PRP-OMP)(PedvaxHIB) 1999    HIB, Unspecified 2000    HPV Quadrivalent 2011, 2011    HepA, Unspecified 2011    HepB, Unspecified 2000    Hepatitis A (ADULT 19+) 2011    Hepatitis B, Adult 1999    Hepatitis B, Peds 2000    Influenza (IIV3) PF 2011    Influenza Vaccine >6 months,quad, PF 2022    MMR 2004, 2004    Pneumococcal (PCV 7) 07/10/2001, 07/10/2001    Poliovirus, inactivated (IPV) 2004, 2004    TDAP (Adacel,Boostrix) 2023    TRIHIBIT (DTAP/HIB, <7y) 2000    Varicella 2011, 2011     Tdap this pregnancy?  YES - Date: 24  Flu shot this pregnancy?NO  COVID vaccine? NO  RSV vaccine? UNKNOWN         Past Medical History:     Past Medical History:   Diagnosis Date    Anxiety     Depression     Depressive disorder     no medications currently, per pt \"midwife would like me to start again after pregnancy\"    Obesity     Urinary tract infection             Past Surgical History:     Past Surgical History:   Procedure Laterality Date    CHOLANGIOGRAM N/A 2023    Procedure: INTRAOPERATIVE CHOLANGIOGRAMS;  Surgeon: Dimas Cloud DO;  [place] [facility] [place]    ESOPHAGOSCOPY, GASTROSCOPY, DUODENOSCOPY (EGD), COMBINED N/A 2023    " Procedure: ENDOSCOPIC ULTRASOUND;  Surgeon: Eleuterio Kline MD;  [place] [facility] [place]    LAPAROSCOPIC CHOLECYSTECTOMY N/A 6/1/2023    Procedure: CHOLECYSTECTOMY, ROBOT-ASSISTED, LAPAROSCOPIC, USING DA LINDSAY XI,;  Surgeon: Dimas Cloud DO;  Location: [facility] [place]    TONSILLECTOMY      WISDOM TOOTH EXTRACTION              Family History:     Family History   Problem Relation Age of Onset    Depression Mother     Depression Father     Cancer Paternal Grandmother             Social History:   no tobacco use  no alcohol use  no illicit drug use         Medications:     Current Facility-Administered Medications   Medication Dose Route Frequency Provider Last Rate Last Admin    acetaminophen (TYLENOL) tablet 650 mg  650 mg Oral Q4H PRN Carla Mims MD        carboprost (HEMABATE) injection 250 mcg  250 mcg Intramuscular Q15 Min PRN Carla Mims MD        And    loperamide (IMODIUM) capsule 4 mg  4 mg Oral Once PRN Carla Mims MD        fentaNYL (PF) (SUBLIMAZE) injection 100 mcg  100 mcg Intravenous Q1H PRN Carla Mims MD        ketorolac (TORADOL) injection 30 mg  30 mg Intravenous Once PRN Carla Mims MD        Or    ketorolac (TORADOL) injection 30 mg  30 mg Intramuscular Once PRN Carla Mims MD        Or    ibuprofen (ADVIL/MOTRIN) tablet 800 mg  800 mg Oral Once PRN Carla Mims MD        lactated ringers BOLUS 1,000 mL  1,000 mL Intravenous Once PRN Carla Mims MD        Or    lactated ringers BOLUS 500 mL  500 mL Intravenous Once PRN Carla Mims MD        lidocaine 1 % 0.1-20 mL  0.1-20 mL Subcutaneous Once PRN Carla Mims MD        loperamide (IMODIUM) capsule 2 mg  2 mg Oral Q2H PRN Carla Mims MD        methylergonovine (METHERGINE) injection 200 mcg  200 mcg Intramuscular Q2H PRN Carla Mims MD        metoclopramide (REGLAN) tablet 10 mg  10 mg Oral Q6H PRN Carla Mims MD        Or     metoclopramide (REGLAN) injection 10 mg  10 mg Intravenous Q6H PRN Carla Mims MD        misoprostol (CYTOTEC) tablet 400 mcg  400 mcg Oral ONCE PRN REPEAT PER INSTRUCTIONS Carla Mims MD        Or    misoprostol (CYTOTEC) tablet 800 mcg  800 mcg Rectal ONCE PRN REPEAT PER INSTRUCTIONS Carla Mims MD        naloxone (NARCAN) injection 0.2 mg  0.2 mg Intravenous Q2 Min PRN Carla Mims MD        Or    naloxone (NARCAN) injection 0.4 mg  0.4 mg Intravenous Q2 Min PRN Carla Mims MD        Or    naloxone (NARCAN) injection 0.2 mg  0.2 mg Intramuscular Q2 Min PRN Carla Mims MD        Or    naloxone (NARCAN) injection 0.4 mg  0.4 mg Intramuscular Q2 Min PRN Carla Mims MD        nitrous oxide/oxygen 50/50 blend   Inhalation Continuous PRN Carla Mims MD        ondansetron (ZOFRAN ODT) ODT tab 4 mg  4 mg Oral Q6H PRN Carla Mims MD        Or    ondansetron (ZOFRAN) injection 4 mg  4 mg Intravenous Q6H PRN Carla Mims MD        oxytocin (PITOCIN) 30 units in 500 mL 0.9% NaCl infusion  100-340 mL/hr Intravenous Continuous PRN Carla Mims MD        oxytocin (PITOCIN) 30 units in 500 mL 0.9% NaCl infusion  340 mL/hr Intravenous Continuous PRN Carla Mims MD        oxytocin (PITOCIN) injection 10 Units  10 Units Intramuscular Once PRN Carla Mims MD        oxytocin (PITOCIN) injection 10 Units  10 Units Intramuscular Once PRN Carla Mims MD        prochlorperazine (COMPAZINE) tablet 10 mg  10 mg Oral Q6H PRN Carla Mims MD        Or    prochlorperazine (COMPAZINE) injection 10 mg  10 mg Intravenous Q6H PRN Carla Mims MD        sodium citrate-citric acid (BICITRA) solution 30 mL  30 mL Oral Once PRN Carla Mims MD        tranexamic acid 1 g in 100 mL NS IV bag (premix)  1 g Intravenous Q30 Min PRN Carla Mims MD                Allergies:   Patient has no known  allergies.         Review of Systems:   CONSTITUTIONAL: no fatigue, no unexpected change in weight  SKIN: no worrisome rashes or lesions  EYES: no acute vision problems or changes  ENT: no ear problems, no mouth problems, no throat problems  RESP: no significant cough, no shortness of breath  CV: no chest pain, no palpitations, no new or worsening peripheral edema  GI: no nausea, no vomiting, no constipation, no diarrhea  : no frequency, no dysuria, no hematuria  NEURO: no weakness, no dizziness, no headaches  ENDOCRINE: no temperature intolerance, no skin/hair changes  PSYCHIATRIC: NEGATIVE for changes in mood or trouble with sleep         Physical Exam:   Vitals:   /66 (BP Location: Left arm, Patient Position: Sitting, Cuff Size: Adult Regular)   Pulse 96   Temp 98.1  F (36.7  C) (Oral)   Resp 18   Ht 1.524 m (5')   Wt 93.9 kg (207 lb)   LMP  (LMP Unknown)   SpO2 98%   BMI 40.43 kg/m    207 lbs 0 oz  Estimated body mass index is 40.43 kg/m  as calculated from the following:    Height as of this encounter: 1.524 m (5').    Weight as of this encounter: 93.9 kg (207 lb).    GEN: Awake, alert in no apparent distress   HEENT: grossly normal  NECK: no lymphadenopathy or thryoidomegaly  RESPIRATORY: clear to auscultation bilaterally, no increased work of breathing  BACK:  no costovertebral angle tenderness   CARDIOVASCULAR: RRR, no murmur  ABDOMEN: gravid  Cervix: 5.5/70%/-1  EXT:  no edema or calf tenderness      Electronic Fetal Monitoring:  Baseline rate normal  Variability moderate  Accelerations present  Decelerations not present      Uterine Activity normal.    Strip reviewed remotely via [facility]    NST interpretation:  Baseline rate 139 normal  Accelerations present  Decelerations not present  Interpretation: reactive

## 2024-10-09 NOTE — L&D DELIVERY NOTE
OB Vaginal Delivery Note    Amanda Miller MRN# [id number]   Age: 24 year old YOB: 2000       GA: 39w0d  GP:   Labor Complications: None   EBL:   mL  Delivery QBL: 120 mL  Delivery Type: Vaginal, Spontaneous   ROM to Delivery Time: (Delivered) Hours: 13 Minutes: 26  [place] Weight:     1 Minute 5 Minute 10 Minute   Apgar Totals: 8   9        LORENA ROSE A;MARICHUY VALENZUELA     Delivery Details:  Amanda Miller, a 24 year old  female presented to L&D after SROM with clear fluid and contractions. Patient slowly progressed. She got an epidural and progressed to complete. Patient pushed with one contraction and delivered a viable infant with apgars of 8  and 9 . Delivery was via vaginal, spontaneous  to a sterile field under epidural  anesthesia. Infant delivered in vertex  middle  occiput  anterior  position. Anterior and posterior shoulders delivered without difficulty. The cord was clamped, cut twice and   3 vessels were noted. Cord blood was obtained in routine fashion with the following disposition: lab .      Cord complications: none   Placenta delivered at 10/9/2024  4:19 AM . Placental disposition was Hospital disposal . Fundal massage performed and fundus found to be firm.     Episiotomy: none    Perineum, vagina, cervix were inspected, and the following lacerations were noted:   Perineal lacerations: none   periurethral laceration: left                 Excellent hemostasis was noted. Needle count correct. Infant and patient in delivery room in good and stable condition.        Tommy Miller-Amanda [[id number]]      Labor Events    Labor Type: Spontaneous  Predominate monitoring during 1st stage: continuous electronic fetal monitoring     Antibiotics received during labor?: No     Rupture identifier: Sac 1  Rupture date/time: 10/8/24 1445   Rupture type: Spontaneous Rupture of Membranes  Fluid color: Clear     Augmentation: None       Delivery/Placenta Date and Time      Delivery  Date: 10/9/24 Delivery Time:  4:11 AM   Placenta Date/Time: 10/9/2024  4:19 AM  Oxytocin given at the time of delivery: after delivery of baby  Delivering clinician: Carla Mims MD   Other personnel present at delivery:  Provider Role   Ruchi Rose RN Delivery Nurse   Raina Nick RN              Vaginal Counts              Needles Suture Needles Sponges (RETIRED) Instruments   Initial counts       Added to count       Relief counts       Final counts               Placed during labor Accounted for at the end of labor   FSE NA NA   IUPC NA NA   Cervidil NA NA                             Apgars    Living status: Living   1 Minute 5 Minute 10 Minute 15 Minute 20 Minute   Skin color: 0  1       Heart rate: 2  2       Reflex irritability: 2  2       Muscle tone: 2  2       Respiratory effort: 2  2       Total: 8  9       Apgars assigned by: DAVIAN ROSE RN       Cord      Cord Complications: None               Cord Blood Disposition: Lab    Gases Sent?: No    Delayed cord clamping?: Yes    Cord Clamping Delay (seconds): >120 seconds    Stem cell collection?: No           Labor Events and Shoulder Dystocia    Fetal Tracing Prior to Delivery: Category 1  Shoulder dystocia present?: Neg       Delivery (Maternal) (Provider to Complete) ([id number])    Episiotomy: None  Perineal lacerations: None      Periurethral laceration: left Repaired?: No   Repair suture: 3-0 Vicryl  Genital tract inspection done: Pos       Blood Loss  Mother: MillerAmanda #[id number]     Start of Mother's Information      Delivery Blood Loss  10/08/24 1611 - 10/09/24 0430      Delivery QBL (mL) Hospital Encounter 120 mL    Total  120 mL               End of Mother's Information  Mother: PaulAmanda #[id number]                Delivery - Provider to Complete ([id number])    Delivering clinician: Carla Mims MD  Delivery Type (Choose the 1 that will go to the Birth History): Vaginal, Spontaneous                         Other  personnel:  Provider Role   Ruchi Hunt, RN Delivery Nurse   Raina Nick, RN                     Placenta    Date/Time: 10/9/2024  4:19 AM  Removal: Spontaneous  Disposition: Hospital disposal             Anesthesia    Method: Epidural  Cervical dilation at placement: 4-7                    Presentation and Position    Presentation: Vertex    Position: Middle Occiput Anterior                     Carla Mims MD

## 2024-10-10 PROBLEM — Z34.90 PREGNANT: Status: RESOLVED | Noted: 2024-10-08 | Resolved: 2024-10-10

## 2024-10-10 PROCEDURE — 120N000001 HC R&B MED SURG/OB

## 2024-10-10 PROCEDURE — 250N000013 HC RX MED GY IP 250 OP 250 PS 637: Performed by: STUDENT IN AN ORGANIZED HEALTH CARE EDUCATION/TRAINING PROGRAM

## 2024-10-10 RX ADMIN — IBUPROFEN 800 MG: 800 TABLET, FILM COATED ORAL at 18:24

## 2024-10-10 RX ADMIN — ACETAMINOPHEN 650 MG: 325 TABLET ORAL at 06:51

## 2024-10-10 RX ADMIN — ACETAMINOPHEN 650 MG: 325 TABLET ORAL at 18:24

## 2024-10-10 RX ADMIN — BUPROPION HYDROCHLORIDE 300 MG: 300 TABLET, EXTENDED RELEASE ORAL at 08:14

## 2024-10-10 RX ADMIN — DOCUSATE SODIUM 100 MG: 100 CAPSULE, LIQUID FILLED ORAL at 08:14

## 2024-10-10 ASSESSMENT — ACTIVITIES OF DAILY LIVING (ADL)
ADLS_ACUITY_SCORE: 22

## 2024-10-10 NOTE — PLAN OF CARE
"  Problem: Labor  Goal: Stable Fetal Wellbeing  Outcome: Progressing  Intervention: Promote and Monitor Fetal Wellbeing  Recent Flowsheet Documentation  Taken 10/10/2024 1811 by Cassandra Myers RN  Body Position: position changed independently  Taken 10/10/2024 1105 by Cassandra Myers, RN  Body Position: position changed independently  Goal: Absence of Infection Signs and Symptoms  Outcome: Progressing   Goal Outcome Evaluation: patient bonding well with . Vitally stable on room air. Rates pain 9/10 to hips and back. Pain being managed with PRN tylenol, ibuprofen, heat, and cold applications. Voiding spontaneously. Independent with activity. Bleeding is light, no blood clots or odor noted. Feeding infant expressed colostrum and human donor milk. Patient requested for MMR vaccine to be moved to tomorrow. Stating \"I don't want to be poked right now.\"                         "

## 2024-10-10 NOTE — CONSULTS
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     Substance Use History: N/A    Summary:  [facility] met and introduced self and CM services to MOB and FOB that was visiting.  MOB lives with her , In Laws and 3 Sister In Laws. Has good support. MOB has all needed baby supplies. MOB will add baby to WIC. MOB states that she has Depression and Anxiety.  MOB is not seeing a therapist currently and feels that she does not need one at this time. MOB's OBGYN assists and is on Wellbutrin.  MOB denied any CM needs.  [facility] gave MOB Post Partum hand out.      EMILIANO Beck  10/10/2024 10:05 AM     within normal limits

## 2024-10-10 NOTE — PROGRESS NOTES
Maternal Postpartum Progress Note  [facility]  10/10/2024 8:53 AM     ________________________________________________________________________    Assessment:    Postpartum Day #1 s/p vaginal delivery.    Active Problems:    Normal delivery       Plan:   - Continue current care.  - Likely home tomorrow.  ________________________________________________________________________    Subjective:  Patient denies chest pain, headache, dizziness, dyspnea, and leg pain.  Ambulating and eating.  Bleeding has decreased.    Objective:  Patient Vitals for the past 24 hrs:   BP Temp Temp src Pulse Resp SpO2   10/10/24 0202 116/75 -- -- 75 16 --   10/09/24 2218 99/46 97.8  F (36.6  C) Oral 79 18 --   10/09/24 1803 105/64 98  F (36.7  C) Oral 82 18 98 %   10/09/24 1300 100/52 98.7  F (37.1  C) Oral 71 18 99 %   10/09/24 1000 97/51 97.8  F (36.6  C) Oral 77 18 99 %     General: Alert, comfortable.  Heart: RRR, no murmur.  Lungs: CTA bilaterally.  Abdomen: non-distended. Uterine fundus firm, below umbilicus.  Ext: trace edema, no calf tenderness.      Completed by:   Daisy Krishnan MD  [facility]  10/10/2024 8:53 AM   used: Not needed.

## 2024-10-10 NOTE — LACTATION NOTE
This note was copied from a baby's chart.  Follow up with Amanda to see how feedings have gone over night.  OT was here and just finished a feeding using ELLEN and baby is sucking better, taking 5 mls dbm and 3mls of mbm.  She has continued to do combination of pumping and hand expression and is getting 2-3 mls.  Weight loss is at 3% and baby is meeting his output goals.  Will work on breast feeding as baby cues.      1600- Baby awake and working on feeding.  With suck assessment on my gloved finger, a lot of stimulation to his palate required to illicit sucking.  Baby disorganized and uncoordinated at first, but did get into a good sucking pattern.  Placed in cross cradle on the right breast with nipple shield, baby would root and open a wide gape, but wouldn't suck.  Attempt lasted 5 minutes then switched to bottle.  Parents are using the DR. Brown level 1 nipple on 35 ml bottle.  Dad is able to get baby to take 9 mls dbm over 10 minutes, baby not eager.      Mom plans to continue pumping and hand expression, work with latching as baby cues and supplementing with bottle.

## 2024-10-11 VITALS
HEIGHT: 60 IN | OXYGEN SATURATION: 95 % | SYSTOLIC BLOOD PRESSURE: 101 MMHG | TEMPERATURE: 98.1 F | WEIGHT: 207 LBS | DIASTOLIC BLOOD PRESSURE: 59 MMHG | RESPIRATION RATE: 16 BRPM | BODY MASS INDEX: 40.64 KG/M2 | HEART RATE: 82 BPM

## 2024-10-11 PROCEDURE — 250N000013 HC RX MED GY IP 250 OP 250 PS 637: Performed by: STUDENT IN AN ORGANIZED HEALTH CARE EDUCATION/TRAINING PROGRAM

## 2024-10-11 PROCEDURE — 90707 MMR VACCINE SC: CPT | Performed by: STUDENT IN AN ORGANIZED HEALTH CARE EDUCATION/TRAINING PROGRAM

## 2024-10-11 PROCEDURE — 250N000011 HC RX IP 250 OP 636: Performed by: STUDENT IN AN ORGANIZED HEALTH CARE EDUCATION/TRAINING PROGRAM

## 2024-10-11 PROCEDURE — 90471 IMMUNIZATION ADMIN: CPT | Performed by: STUDENT IN AN ORGANIZED HEALTH CARE EDUCATION/TRAINING PROGRAM

## 2024-10-11 RX ORDER — ACETAMINOPHEN 500 MG
500-1000 TABLET ORAL EVERY 8 HOURS PRN
COMMUNITY
Start: 2024-10-11

## 2024-10-11 RX ORDER — DOCUSATE SODIUM 100 MG/1
100 CAPSULE, LIQUID FILLED ORAL 2 TIMES DAILY
COMMUNITY
Start: 2024-10-11 | End: 2024-10-27

## 2024-10-11 RX ORDER — IBUPROFEN 200 MG
600 TABLET ORAL EVERY 8 HOURS PRN
COMMUNITY
Start: 2024-10-11

## 2024-10-11 RX ADMIN — IBUPROFEN 800 MG: 800 TABLET, FILM COATED ORAL at 05:45

## 2024-10-11 RX ADMIN — MEASLES, MUMPS, AND RUBELLA VIRUS VACCINE LIVE 0.5 ML: 1000; 12500; 1000 INJECTION, POWDER, LYOPHILIZED, FOR SUSPENSION SUBCUTANEOUS at 11:22

## 2024-10-11 RX ADMIN — DOCUSATE SODIUM 100 MG: 100 CAPSULE, LIQUID FILLED ORAL at 08:27

## 2024-10-11 RX ADMIN — IBUPROFEN 800 MG: 800 TABLET, FILM COATED ORAL at 00:29

## 2024-10-11 RX ADMIN — BUPROPION HYDROCHLORIDE 300 MG: 300 TABLET, EXTENDED RELEASE ORAL at 08:27

## 2024-10-11 RX ADMIN — ACETAMINOPHEN 650 MG: 325 TABLET ORAL at 05:45

## 2024-10-11 RX ADMIN — ACETAMINOPHEN 650 MG: 325 TABLET ORAL at 00:30

## 2024-10-11 ASSESSMENT — ACTIVITIES OF DAILY LIVING (ADL)
ADLS_ACUITY_SCORE: 22

## 2024-10-11 NOTE — PLAN OF CARE
Goal Outcome Evaluation:    Problem: Postpartum (Vaginal Delivery)  Goal: Absence of Infection Signs and Symptoms  Outcome: Progressing     Problem: Breastfeeding  Goal: Effective Breastfeeding  Outcome: Progressing  Intervention: Promote Breast Care and Comfort  Recent Flowsheet Documentation  Taken 10/11/2024 0000 by Mary Patton RN  Breast Care: Breastfeeding: ([place])   open to air   nipple shield utilized   milk massaged towards nipple  Intervention: Promote Effective Breastfeeding  Recent Flowsheet Documentation  Taken 10/11/2024 0000 by Mary Patton RN  Breastfeeding Assistance:   electric breast pump used   feeding cue recognition promoted   feeding on demand promoted   nipple shield utilized   supplemental feeding provided   support offered  Intervention: Support Exclusive Breastfeeding Success  Recent Flowsheet Documentation  Taken 10/11/2024 0000 by Mary Patton RN  Breastfeeding Support:   diary/feeding log utilized   encouragement provided   infant-mother separation minimized   maternal hydration promoted   maternal nutrition promoted   maternal rest encouraged   Vitals, fundal assessment, and lochia wdl. Bonding well with infant. Ambulating and voiding independently. Pain is being managed by ibuprofen and tylenol, heat packs, ice packs, tub bath. Bringing baby to breast with nipple shield, then hand expressing and pumping.     Mary Patton, RN

## 2024-10-11 NOTE — DISCHARGE SUMMARY
Maternal Discharge Summary  [facility]  Date of Service: 10/11/2024    Name      Amanda Miller         2000  MRN       [id number]  PCP        Carla Cobos at [facility] ________________________________________________________________________    Assessment:  Amanda Miller is a 24 year old now  s/p vaginal, spontaneous  at 39w0d on 10/9/2024.    Discharge Plan:   Discharge to Home. Condition at Discharge:  stable  Physical activity: Regular.  Diet:  Regular.  Home care nurse: declined by patient  Lactation clinic appointment:  in clinic .  Follow up with Carla Cobos in 6 weeks.  No future appointments.   ________________________________________________________________________    Admission Date:  10/8/2024  Discharge Date:  10/11/2024  Delivery Date:  10/9/2024  Gestational Age at Delivery: 39w0d    Principal Diagnosis: Labor and delivery  Delivery type: Vaginal, Spontaneous   Active Problems:    Normal delivery      Subjective:  Patient denies SOB, CP, lightheadedness, increased bleeding.      Discharge Exam:  Patient Vitals for the past 24 hrs:   BP Temp Temp src Pulse Resp SpO2   10/11/24 0030 95/51 97.5  F (36.4  C) Oral 78 17 95 %   10/10/24 1811 107/61 97.9  F (36.6  C) Oral 84 -- 97 %   10/10/24 1138 101/55 -- -- -- -- --   10/10/24 1105 (!) 87/83 97.3  F (36.3  C) Oral 71 16 98 %     General - alert, comfortable  Heart - RRR, no murmurs  Lungs - CTA bilaterally  Abdomen - fundus firm, nontender, below umbilicus  Extremities - trace edema  Admission on 10/08/2024   Component Date Value Ref Range Status    Hemoglobin 10/08/2024 13.4  11.7 - 15.7 g/dL Final    Treponema Antibody Total 10/08/2024 Nonreactive  Nonreactive Final    ABO/RH(D) 10/08/2024 O POS   Final    Antibody Screen 10/08/2024 Negative  Negative Final    SPECIMEN EXPIRATION DATE 10/08/2024 [id number]   Final    Rubella Antibody IgG (External)  03/25/2024 Non-Immune  Nonreactive Final    HIV 1&2 Antibody (External) 03/25/2024 Nonreactive  Nonreactive Final    Hepatitis B Surface Antigen (Exter* 03/25/2024 Nonreactive  Nonreactive Final    Treponema Palldum Antibody (Extern* 03/25/2024 Nonreactive  Nonreactive Final    Group B Streptococcus (External) 09/23/2024 Negative  Negative Final      Discharge Medications: See medication reconciliation.     Completed by:   Carla Mims DO  [facility]  10/11/2024 7:37 AM   used: Not needed.

## 2024-10-11 NOTE — DISCHARGE INSTRUCTIONS
Warning Signs after Having a Baby    Keep this paper on your fridge or somewhere else where you can see it.    Call your provider if you have any of these symptoms up to 12 weeks after having your baby.    Thoughts of hurting yourself or your baby  Pain in your chest or trouble breathing  Severe headache not helped by pain medicine  Eyesight concerns (blurry vision, seeing spots or flashes of light, other changes to eyesight)  Fainting, shaking or other signs of a seizure    Call 9-1-1 if you feel that it is an emergency.     The symptoms below can happen to anyone after giving birth. They can be very serious. Call your provider if you have any of these warning signs.    My provider s phone number: _______________________    Losing too much blood (hemorrhage)    Call your provider if you soak through a pad in less than an hour or pass blood clots bigger than a golf ball. These may be signs that you are bleeding too much.    Blood clots in the legs or lungs    After you give birth, your body naturally clots its blood to help prevent blood loss. Sometimes this increased clotting can happen in other areas of the body, like the legs or lungs. This can block your blood flow and be very dangerous.     Call your provider if you:  Have a red, swollen spot on the back of your leg that is warm or painful when you touch it.   Are coughing up blood.     Infection    Call your provider if you have any of these symptoms:  Fever of 100.4 F (38 C) or higher.  Pain or redness around your stitches if you had an incision.   Any yellow, white, or green fluid coming from places where you had stitches or surgery.    Mood Problems (postpartum depression)    Many people feel sad or have mood changes after having a baby. But for some people, these mood swings are worse.     Call your provider right away if you feel so anxious or nervous that you can't care for yourself or your baby.    Preeclampsia (high blood pressure)    Even if you  didn't have high blood pressure when you were pregnant, you are at risk for the high blood pressure disease called preeclampsia. This risk can last up to 12 weeks after giving birth.     Call your provider if you have:   Pain on your right side under your rib cage  Sudden swelling in the hands and face    Remember: You know your body. If something doesn't feel right, get medical help.     For informational purposes only. Not to replace the advice of your health care provider. Copyright 2020 [facility]. All rights reserved. Clinically reviewed by Debbie Aguero, RNC-OB, MSN. [employer] 345613 - Rev 02/23.

## 2024-10-11 NOTE — CARE PLAN
Infant discharged home with parents, vitally stable. Parents provided with education regarding feedings and follow up instructions.

## 2024-10-11 NOTE — PROGRESS NOTES
Outreach Note for TONI Miller  [id number]  2000    Discharge follow-up plan discussed with patient, needs assessed. Pt requests all follow-up through clinic/physician, declines home care visit, unless medically indicated and ordered by physician, and declines follow-up phone call.   No further needs identified at this time.

## 2024-10-11 NOTE — PLAN OF CARE
Problem: Adult Inpatient Plan of Care  Goal: Plan of Care Review  Description: The Plan of Care Review/Shift note should be completed every shift.  The Outcome Evaluation is a brief statement about your assessment that the patient is improving, declining, or no change.  This information will be displayed automatically on your shift  note.  Outcome: Met  Flowsheets (Taken 10/11/2024 3021)  Plan of Care Reviewed With: patient     Problem: Adult Inpatient Plan of Care  Goal: Optimal Comfort and Wellbeing  Outcome: Met     Problem: Postpartum (Vaginal Delivery)  Goal: Optimal Pain Control and Function  Outcome: Met     Problem: Breastfeeding  Goal: Effective Breastfeeding  Outcome: Met     Goal Outcome Evaluation:      Plan of Care Reviewed With: patient     Discharge instructions provided. Patient verbalized understanding of AVS instructions. Vitally stable.

## 2024-10-26 ENCOUNTER — HOSPITAL ENCOUNTER (EMERGENCY)
Facility: HOSPITAL | Age: 24
Discharge: HOME | End: 2024-10-26
Attending: FAMILY MEDICINE | Admitting: FAMILY MEDICINE
Payer: COMMERCIAL

## 2024-10-26 ENCOUNTER — HOSPITAL ENCOUNTER (INPATIENT)
Facility: HOSPITAL | Age: 24
LOS: 2 days | Discharge: HOME | End: 2024-10-29
Attending: EMERGENCY MEDICINE | Admitting: INTERNAL MEDICINE
Payer: COMMERCIAL

## 2024-10-26 ENCOUNTER — APPOINTMENT (OUTPATIENT)
Dept: CT IMAGING | Facility: HOSPITAL | Age: 24
End: 2024-10-26
Attending: FAMILY MEDICINE
Payer: COMMERCIAL

## 2024-10-26 VITALS
HEART RATE: 69 BPM | RESPIRATION RATE: 16 BRPM | BODY MASS INDEX: 38.79 KG/M2 | DIASTOLIC BLOOD PRESSURE: 65 MMHG | WEIGHT: 197.6 LBS | SYSTOLIC BLOOD PRESSURE: 108 MMHG | HEIGHT: 60 IN | TEMPERATURE: 97 F | OXYGEN SATURATION: 98 %

## 2024-10-26 DIAGNOSIS — R10.13 ABDOMINAL PAIN, EPIGASTRIC: ICD-10-CM

## 2024-10-26 DIAGNOSIS — R79.89 ABNORMAL LFTS: ICD-10-CM

## 2024-10-26 DIAGNOSIS — R93.89 THICKENED ENDOMETRIUM: ICD-10-CM

## 2024-10-26 DIAGNOSIS — R10.13 EPIGASTRIC PAIN: ICD-10-CM

## 2024-10-26 LAB
ALBUMIN SERPL BCG-MCNC: 4 G/DL (ref 3.5–5.2)
ALBUMIN UR-MCNC: NEGATIVE MG/DL
ALP SERPL-CCNC: 199 U/L (ref 40–150)
ALT SERPL W P-5'-P-CCNC: 23 U/L (ref 0–50)
ANION GAP SERPL CALCULATED.3IONS-SCNC: 13 MMOL/L (ref 7–15)
APPEARANCE UR: CLEAR
AST SERPL W P-5'-P-CCNC: 33 U/L (ref 0–45)
BASOPHILS # BLD AUTO: 0 10E3/UL (ref 0–0.2)
BASOPHILS # BLD AUTO: 0 10E3/UL (ref 0–0.2)
BASOPHILS NFR BLD AUTO: 1 %
BASOPHILS NFR BLD AUTO: 1 %
BILIRUB DIRECT SERPL-MCNC: <0.2 MG/DL (ref 0–0.3)
BILIRUB SERPL-MCNC: 0.5 MG/DL
BILIRUB UR QL STRIP: NEGATIVE
BUN SERPL-MCNC: 19.2 MG/DL (ref 6–20)
CALCIUM SERPL-MCNC: 9.2 MG/DL (ref 8.8–10.4)
CHLORIDE SERPL-SCNC: 106 MMOL/L (ref 98–107)
COLOR UR AUTO: ABNORMAL
CREAT SERPL-MCNC: 1 MG/DL (ref 0.51–0.95)
EGFRCR SERPLBLD CKD-EPI 2021: 80 ML/MIN/1.73M2
EOSINOPHIL # BLD AUTO: 0.1 10E3/UL (ref 0–0.7)
EOSINOPHIL # BLD AUTO: 0.1 10E3/UL (ref 0–0.7)
EOSINOPHIL NFR BLD AUTO: 1 %
EOSINOPHIL NFR BLD AUTO: 2 %
ERYTHROCYTE [DISTWIDTH] IN BLOOD BY AUTOMATED COUNT: 14.6 % (ref 10–15)
ERYTHROCYTE [DISTWIDTH] IN BLOOD BY AUTOMATED COUNT: 14.8 % (ref 10–15)
GLUCOSE SERPL-MCNC: 84 MG/DL (ref 70–99)
GLUCOSE UR STRIP-MCNC: NEGATIVE MG/DL
HCO3 SERPL-SCNC: 23 MMOL/L (ref 22–29)
HCT VFR BLD AUTO: 42.3 % (ref 35–47)
HCT VFR BLD AUTO: 44.5 % (ref 35–47)
HGB BLD-MCNC: 13.8 G/DL (ref 11.7–15.7)
HGB BLD-MCNC: 14.2 G/DL (ref 11.7–15.7)
HGB UR QL STRIP: ABNORMAL
IMM GRANULOCYTES # BLD: 0 10E3/UL
IMM GRANULOCYTES # BLD: 0 10E3/UL
IMM GRANULOCYTES NFR BLD: 0 %
IMM GRANULOCYTES NFR BLD: 1 %
KETONES UR STRIP-MCNC: NEGATIVE MG/DL
LEUKOCYTE ESTERASE UR QL STRIP: ABNORMAL
LIPASE SERPL-CCNC: 85 U/L (ref 13–60)
LYMPHOCYTES # BLD AUTO: 0.9 10E3/UL (ref 0.8–5.3)
LYMPHOCYTES # BLD AUTO: 1.8 10E3/UL (ref 0.8–5.3)
LYMPHOCYTES NFR BLD AUTO: 16 %
LYMPHOCYTES NFR BLD AUTO: 20 %
MAGNESIUM SERPL-MCNC: 2.1 MG/DL (ref 1.7–2.3)
MCH RBC QN AUTO: 25.9 PG (ref 26.5–33)
MCH RBC QN AUTO: 26.3 PG (ref 26.5–33)
MCHC RBC AUTO-ENTMCNC: 31.9 G/DL (ref 31.5–36.5)
MCHC RBC AUTO-ENTMCNC: 32.6 G/DL (ref 31.5–36.5)
MCV RBC AUTO: 81 FL (ref 78–100)
MCV RBC AUTO: 81 FL (ref 78–100)
MONOCYTES # BLD AUTO: 0.3 10E3/UL (ref 0–1.3)
MONOCYTES # BLD AUTO: 0.5 10E3/UL (ref 0–1.3)
MONOCYTES NFR BLD AUTO: 5 %
MONOCYTES NFR BLD AUTO: 5 %
MUCOUS THREADS #/AREA URNS LPF: PRESENT /LPF
NEUTROPHILS # BLD AUTO: 4.5 10E3/UL (ref 1.6–8.3)
NEUTROPHILS # BLD AUTO: 6.4 10E3/UL (ref 1.6–8.3)
NEUTROPHILS NFR BLD AUTO: 72 %
NEUTROPHILS NFR BLD AUTO: 77 %
NITRATE UR QL: NEGATIVE
NRBC # BLD AUTO: 0 10E3/UL
NRBC # BLD AUTO: 0 10E3/UL
NRBC BLD AUTO-RTO: 0 /100
NRBC BLD AUTO-RTO: 0 /100
PH UR STRIP: 6 [PH] (ref 5–7)
PLATELET # BLD AUTO: 256 10E3/UL (ref 150–450)
PLATELET # BLD AUTO: 269 10E3/UL (ref 150–450)
POTASSIUM SERPL-SCNC: 3.9 MMOL/L (ref 3.4–5.3)
PROT SERPL-MCNC: 6.9 G/DL (ref 6.4–8.3)
RBC # BLD AUTO: 5.24 10E6/UL (ref 3.8–5.2)
RBC # BLD AUTO: 5.48 10E6/UL (ref 3.8–5.2)
RBC URINE: 3 /HPF
SODIUM SERPL-SCNC: 142 MMOL/L (ref 135–145)
SP GR UR STRIP: 1.02 (ref 1–1.03)
SQUAMOUS EPITHELIAL: 1 /HPF
UROBILINOGEN UR STRIP-MCNC: <2 MG/DL
WBC # BLD AUTO: 5.8 10E3/UL (ref 4–11)
WBC # BLD AUTO: 8.8 10E3/UL (ref 4–11)
WBC URINE: 2 /HPF

## 2024-10-26 PROCEDURE — 36415 COLL VENOUS BLD VENIPUNCTURE: CPT | Performed by: FAMILY MEDICINE

## 2024-10-26 PROCEDURE — 82374 ASSAY BLOOD CARBON DIOXIDE: CPT | Performed by: EMERGENCY MEDICINE

## 2024-10-26 PROCEDURE — 85004 AUTOMATED DIFF WBC COUNT: CPT | Performed by: FAMILY MEDICINE

## 2024-10-26 PROCEDURE — 74177 CT ABD & PELVIS W/CONTRAST: CPT

## 2024-10-26 PROCEDURE — 83690 ASSAY OF LIPASE: CPT | Performed by: FAMILY MEDICINE

## 2024-10-26 PROCEDURE — 83735 ASSAY OF MAGNESIUM: CPT | Performed by: FAMILY MEDICINE

## 2024-10-26 PROCEDURE — 96375 TX/PRO/DX INJ NEW DRUG ADDON: CPT

## 2024-10-26 PROCEDURE — 250N000011 HC RX IP 250 OP 636: Performed by: FAMILY MEDICINE

## 2024-10-26 PROCEDURE — 82248 BILIRUBIN DIRECT: CPT | Performed by: FAMILY MEDICINE

## 2024-10-26 PROCEDURE — 85014 HEMATOCRIT: CPT | Performed by: EMERGENCY MEDICINE

## 2024-10-26 PROCEDURE — 36415 COLL VENOUS BLD VENIPUNCTURE: CPT | Performed by: EMERGENCY MEDICINE

## 2024-10-26 PROCEDURE — 83690 ASSAY OF LIPASE: CPT | Performed by: EMERGENCY MEDICINE

## 2024-10-26 PROCEDURE — 99285 EMERGENCY DEPT VISIT HI MDM: CPT | Mod: 25

## 2024-10-26 PROCEDURE — 96374 THER/PROPH/DIAG INJ IV PUSH: CPT | Mod: XU

## 2024-10-26 PROCEDURE — 81003 URINALYSIS AUTO W/O SCOPE: CPT | Performed by: FAMILY MEDICINE

## 2024-10-26 PROCEDURE — 80048 BASIC METABOLIC PNL TOTAL CA: CPT | Performed by: FAMILY MEDICINE

## 2024-10-26 PROCEDURE — 99285 EMERGENCY DEPT VISIT HI MDM: CPT

## 2024-10-26 PROCEDURE — 85004 AUTOMATED DIFF WBC COUNT: CPT | Performed by: EMERGENCY MEDICINE

## 2024-10-26 PROCEDURE — 250N000011 HC RX IP 250 OP 636: Mod: JW | Performed by: FAMILY MEDICINE

## 2024-10-26 RX ORDER — ONDANSETRON 4 MG/1
4 TABLET, ORALLY DISINTEGRATING ORAL EVERY 6 HOURS PRN
Qty: 20 TABLET | Refills: 0 | Status: SHIPPED | OUTPATIENT
Start: 2024-10-26 | End: 2024-10-29

## 2024-10-26 RX ORDER — HYDROMORPHONE HCL IN WATER/PF 6 MG/30 ML
0.5 PATIENT CONTROLLED ANALGESIA SYRINGE INTRAVENOUS ONCE
Status: COMPLETED | OUTPATIENT
Start: 2024-10-26 | End: 2024-10-26

## 2024-10-26 RX ORDER — OXYCODONE HYDROCHLORIDE 5 MG/1
5 TABLET ORAL EVERY 6 HOURS PRN
Qty: 6 TABLET | Refills: 0 | Status: SHIPPED | OUTPATIENT
Start: 2024-10-26

## 2024-10-26 RX ORDER — SUCRALFATE 1 G/1
1 TABLET ORAL 4 TIMES DAILY
Qty: 28 TABLET | Refills: 0 | Status: SHIPPED | OUTPATIENT
Start: 2024-10-26

## 2024-10-26 RX ORDER — OXYCODONE HYDROCHLORIDE 5 MG/1
5 TABLET ORAL EVERY 6 HOURS PRN
Qty: 6 TABLET | Refills: 0 | Status: SHIPPED | OUTPATIENT
Start: 2024-10-26 | End: 2024-10-26

## 2024-10-26 RX ORDER — IOPAMIDOL 755 MG/ML
90 INJECTION, SOLUTION INTRAVASCULAR ONCE
Status: COMPLETED | OUTPATIENT
Start: 2024-10-26 | End: 2024-10-26

## 2024-10-26 RX ORDER — METOCLOPRAMIDE HYDROCHLORIDE 5 MG/ML
10 INJECTION INTRAMUSCULAR; INTRAVENOUS ONCE
Status: COMPLETED | OUTPATIENT
Start: 2024-10-26 | End: 2024-10-27

## 2024-10-26 RX ORDER — DIPHENHYDRAMINE HYDROCHLORIDE 50 MG/ML
25 INJECTION INTRAMUSCULAR; INTRAVENOUS ONCE
Status: COMPLETED | OUTPATIENT
Start: 2024-10-26 | End: 2024-10-27

## 2024-10-26 RX ORDER — ONDANSETRON 2 MG/ML
4 INJECTION INTRAMUSCULAR; INTRAVENOUS ONCE
Status: COMPLETED | OUTPATIENT
Start: 2024-10-26 | End: 2024-10-26

## 2024-10-26 RX ADMIN — IOPAMIDOL 90 ML: 755 INJECTION, SOLUTION INTRAVENOUS at 03:20

## 2024-10-26 RX ADMIN — HYDROMORPHONE HYDROCHLORIDE 0.5 MG: 0.2 INJECTION, SOLUTION INTRAMUSCULAR; INTRAVENOUS; SUBCUTANEOUS at 02:37

## 2024-10-26 RX ADMIN — ONDANSETRON 4 MG: 2 INJECTION INTRAMUSCULAR; INTRAVENOUS at 02:35

## 2024-10-26 ASSESSMENT — ACTIVITIES OF DAILY LIVING (ADL)
ADLS_ACUITY_SCORE: 0

## 2024-10-26 ASSESSMENT — COLUMBIA-SUICIDE SEVERITY RATING SCALE - C-SSRS
2. HAVE YOU ACTUALLY HAD ANY THOUGHTS OF KILLING YOURSELF IN THE PAST MONTH?: NO
6. HAVE YOU EVER DONE ANYTHING, STARTED TO DO ANYTHING, OR PREPARED TO DO ANYTHING TO END YOUR LIFE?: NO
1. IN THE PAST MONTH, HAVE YOU WISHED YOU WERE DEAD OR WISHED YOU COULD GO TO SLEEP AND NOT WAKE UP?: NO
1. IN THE PAST MONTH, HAVE YOU WISHED YOU WERE DEAD OR WISHED YOU COULD GO TO SLEEP AND NOT WAKE UP?: NO
6. HAVE YOU EVER DONE ANYTHING, STARTED TO DO ANYTHING, OR PREPARED TO DO ANYTHING TO END YOUR LIFE?: NO
2. HAVE YOU ACTUALLY HAD ANY THOUGHTS OF KILLING YOURSELF IN THE PAST MONTH?: NO

## 2024-10-26 NOTE — ED TRIAGE NOTES
Patient arrives ambulatory to triage from home with .  Reports upper left abdominal pain and back pain that started approximately thirty minutes ago.     Ibuprofen taken at 0120.     Hx pancreatitis and states this pain is similar.

## 2024-10-26 NOTE — ED PROVIDER NOTES
EMERGENCY DEPARTMENT ENCOUNTER      NAME: Amanda Miller  AGE: 24 year old female  YOB: 2000  MRN: [id number]  EVALUATION DATE & TIME: 10/26/2024  1:43 AM    PCP: Carla Mims    ED PROVIDER: Paco Siu M.D.    Chief Complaint   Patient presents with    Back Pain    Abdominal Pain       FINAL IMPRESSION:  1. Epigastric pain    2. Thickened endometrium        ED COURSE & MEDICAL DECISION MAKING:    Pertinent Labs & Imaging studies independently interpreted by me. (See chart for details)  Reviewed most recent admission from August 15 when patient was admitted with acute pancreatitis, MRCP at that time did not demonstrate any acute findings including normal pancreas and no choledocholithiasis.    ED Course as of 10/26/24 0359   Sat Oct 26, 2024   0150 Patient seen and examined, presents with about 30 minutes of upper abdominal pain radiating to the back.  History of pancreatitis and says this feels similar.  Prior cholecystectomy in 2023.  Normal spontaneous vaginal delivery October 8, no possibility of pregnancy.  On exam here, vitally stable, has epigastric tenderness but no other abdominal tenderness.  Concern for acute pancreatitis, gastritis also possible.  Labs and CT scan are ordered along with Dilaudid and Zofran.   0258 Labs ordered and independently interpreted by me with normal magnesium, normal basic panel, normal CBC, slightly elevated lipase, elevated alkaline phosphatase with otherwise normal hepatic panel.   0345 CT abdomen pelvis independently interpreted by me negative for acute findings in the upper abdomen to explain patient's symptoms, thickened endometrium with blood or possible retained products of conception.  Patient has no pelvic pain, vaginal bleeding, or fever and this can be further evaluated as an outpatient.   0355 Updated patient with findings and plan, stable for discharge.  Possible very early pancreatitis, gastritis or early ulcer seems more likely  based on clinical presentation and findings today.         At the conclusion of the encounter I discussed the results of all of the tests and the disposition. The questions were answered. The patient or family acknowledged understanding and was agreeable with the care plan.     Medical Decision Making  Obtained supplemental history:Supplemental history obtained?: No  Reviewed external records: External records reviewed?: Documented in chart  Care impacted by chronic illness:Documented in Chart  Did you consider but not order tests?: Work up considered but not performed and documented in chart, if applicable  Did you interpret images independently?: Independent interpretation of ECG and images noted in documentation, when applicable.  Consultation discussion with other provider:Did you involve another provider (consultant, , pharmacy, etc.)?: No  Discharge. I prescribed additional prescription strength medication(s) as charted. I considered admission, but discharged patient after significant clinical improvement.    MIPS: Not Applicable      MEDICATIONS GIVEN IN THE EMERGENCY:  Medications   ondansetron (ZOFRAN) injection 4 mg (4 mg Intravenous $Given 10/26/24 0235)   HYDROmorphone (DILAUDID) injection 0.5 mg (0.5 mg Intravenous $Given 10/26/24 0237)   iopamidol (ISOVUE-370) solution 90 mL (90 mLs Intravenous $Given 10/26/24 0320)       NEW PRESCRIPTIONS STARTED AT TODAY'S ER VISIT  New Prescriptions    ONDANSETRON (ZOFRAN ODT) 4 MG ODT TAB    Take 1 tablet (4 mg) by mouth every 6 hours as needed for nausea.    OXYCODONE (ROXICODONE) 5 MG TABLET    Take 1 tablet (5 mg) by mouth every 6 hours as needed for severe pain.    SUCRALFATE (CARAFATE) 1 GM TABLET    Take 1 tablet (1 g) by mouth 4 times daily.       =================================================================    HPI    Patient information was obtained from: Patient, spouse      Amanda Miller is a 24 year old female with a pertinent history of anxiety  "and depression who presents to this ED for evaluation of abdominal pain.  Patient notes about 1 hour of epigastric pain rating up into the back accompanied by nausea, no vomiting.  Had similar in the past and was diagnosed with pancreatitis, has previously had the gallbladder removed.  Denies urinary symptoms, diarrhea, constipation.  She is currently breast-feeding.      REVIEW OF SYSTEMS   Review of Systems   All other systems reviewed and negative    PAST MEDICAL HISTORY:  Past Medical History:   Diagnosis Date    Anxiety     Depression     Depressive disorder     no medications currently, per pt \"midwife would like me to start again after pregnancy\"    Obesity     Urinary tract infection        PAST SURGICAL HISTORY:  Past Surgical History:   Procedure Laterality Date    CHOLANGIOGRAM N/A 6/1/2023    Procedure: INTRAOPERATIVE CHOLANGIOGRAMS;  Surgeon: Dimas Cloud DO;  Location: [facility] OR    ESOPHAGOSCOPY, GASTROSCOPY, DUODENOSCOPY (EGD), COMBINED N/A 6/8/2023    Procedure: ENDOSCOPIC ULTRASOUND;  Surgeon: Eleuterio Kline MD;  Location: [facility] OR    LAPAROSCOPIC CHOLECYSTECTOMY N/A 6/1/2023    Procedure: CHOLECYSTECTOMY, ROBOT-ASSISTED, LAPAROSCOPIC, USING DA LINDSAY XI,;  Surgeon: Dimas Cloud DO;  Location: [facility] OR    TONSILLECTOMY      WISDOM TOOTH EXTRACTION         CURRENT MEDICATIONS:    No current facility-administered medications for this encounter.     Current Outpatient Medications   Medication Sig Dispense Refill    acetaminophen (TYLENOL) 500 MG tablet Take 1-2 tablets (500-1,000 mg) by mouth every 8 hours as needed for mild pain or other (and adjunct with moderate or severe pain or per patient request).      buPROPion (WELLBUTRIN XL) 300 MG 24 hr tablet Take 300 mg by mouth every morning      docusate sodium (COLACE) 100 MG capsule Take 1 capsule (100 mg) by mouth 2 times daily.      famotidine (PEPCID) 20 MG tablet Take 20 mg by mouth 2 times daily      Feeding Supplies " (AMEDA MILK STORAGE BAGS) MISC 150 Units as needed (for storage of expressed/pumped breast milk) 100 each 3    ibuprofen (ADVIL/MOTRIN) 200 MG tablet Take 3 tablets (600 mg) by mouth every 8 hours as needed for other or moderate pain (cramping).      ondansetron (ZOFRAN ODT) 4 MG ODT tab Take 1 tablet (4 mg) by mouth every 6 hours as needed for nausea. 20 tablet 0    oxyCODONE (ROXICODONE) 5 MG tablet Take 1 tablet (5 mg) by mouth every 6 hours as needed for severe pain. 6 tablet 0    sucralfate (CARAFATE) 1 GM tablet Take 1 tablet (1 g) by mouth 4 times daily. 28 tablet 0       ALLERGIES:  No Known Allergies    FAMILY HISTORY:  Family History   Problem Relation Age of Onset    Depression Mother     Depression Father     Cancer Paternal Grandmother        SOCIAL HISTORY:   Social History     Socioeconomic History    Marital status:      Spouse name: Roslyn    Number of children: 1    Highest education level: Some college, no degree   Tobacco Use    Smoking status: Never     Passive exposure: Never    Smokeless tobacco: Never   Vaping Use    Vaping status: Never Used   Substance and Sexual Activity    Alcohol use: Not Currently    Drug use: Never    Sexual activity: Yes     Partners: Male     Birth control/protection: None     Social Drivers of Health     Financial Resource Strain: Low Risk  (10/8/2024)    Financial Resource Strain     Within the past 12 months, have you or your family members you live with been unable to get utilities (heat, electricity) when it was really needed?: No   Food Insecurity: Low Risk  (10/8/2024)    Food Insecurity     Within the past 12 months, did you worry that your food would run out before you got money to buy more?: No     Within the past 12 months, did the food you bought just not last and you didn t have money to get more?: No   Transportation Needs: Low Risk  (10/8/2024)    Transportation Needs     Within the past 12 months, has lack of transportation kept you from  medical appointments, getting your medicines, non-medical meetings or appointments, work, or from getting things that you need?: No    Received from [facility]    Social Connections   Interpersonal Safety: Low Risk  (10/8/2024)    Interpersonal Safety     Do you feel physically and emotionally safe where you currently live?: Yes     Within the past 12 months, have you been hit, slapped, kicked or otherwise physically hurt by someone?: No     Within the past 12 months, have you been humiliated or emotionally abused in other ways by your partner or ex-partner?: No   Housing Stability: Low Risk  (10/8/2024)    Housing Stability     Do you have housing? : Yes     Are you worried about losing your housing?: No       VITALS:  /70   Pulse 78   Temp 97  F (36.1  C) (Temporal)   Resp 16   Ht 1.524 m (5')   Wt 89.6 kg (197 lb 9.6 oz)   LMP  (LMP Unknown)   SpO2 98%   Breastfeeding Yes   BMI 38.59 kg/m      PHYSICAL EXAM:  Physical Exam  Vitals and nursing note reviewed.   Constitutional:       Appearance: Normal appearance.   HENT:      Head: Normocephalic and atraumatic.      Right Ear: External ear normal.      Left Ear: External ear normal.      Nose: Nose normal.      Mouth/Throat:      Mouth: Mucous membranes are moist.   Eyes:      Extraocular Movements: Extraocular movements intact.      Conjunctiva/sclera: Conjunctivae normal.      Pupils: Pupils are equal, round, and reactive to light.   Cardiovascular:      Rate and Rhythm: Normal rate and regular rhythm.   Pulmonary:      Effort: Pulmonary effort is normal.      Breath sounds: Normal breath sounds. No wheezing or rales.   Abdominal:      General: Abdomen is flat. There is no distension.      Palpations: Abdomen is soft.      Tenderness: There is abdominal tenderness (epigastric). There is no guarding.   Musculoskeletal:         General: Normal range of motion.       Cervical back: Normal range of motion and neck supple.      Right lower leg: No edema.      Left lower leg: No edema.   Lymphadenopathy:      Cervical: No cervical adenopathy.   Skin:     General: Skin is warm and dry.   Neurological:      General: No focal deficit present.      Mental Status: She is alert and oriented to person, place, and time. Mental status is at baseline.      Comments: No gross focal neurologic deficits   Psychiatric:         Mood and Affect: Mood normal.         Behavior: Behavior normal.         Thought Content: Thought content normal.          LAB:  All pertinent labs reviewed and interpreted.  Results for orders placed or performed during the hospital encounter of 10/26/24   CT Abdomen Pelvis w Contrast    Impression    IMPRESSION:  1.  No evidence of acute pathology in the abdomen and pelvis. No significant peripancreatic fat stranding to suggest the presence of acute pancreatitis.  2.  Hypodense material within the endometrial cavity, indeterminate, could represent mildly thickened endometrial stripe vs retained products of conception, can be further evaluated with pelvic ultrasound if clinically warranted.   Basic metabolic panel   Result Value Ref Range    Sodium 142 135 - 145 mmol/L    Potassium 3.9 3.4 - 5.3 mmol/L    Chloride 106 98 - 107 mmol/L    Carbon Dioxide (CO2) 23 22 - 29 mmol/L    Anion Gap 13 7 - 15 mmol/L    Urea Nitrogen 19.2 6.0 - 20.0 mg/dL    Creatinine 1.00 (H) 0.51 - 0.95 mg/dL    GFR Estimate 80 >60 mL/min/1.73m2    Calcium 9.2 8.8 - 10.4 mg/dL    Glucose 84 70 - 99 mg/dL   Hepatic function panel   Result Value Ref Range    Protein Total 6.9 6.4 - 8.3 g/dL    Albumin 4.0 3.5 - 5.2 g/dL    Bilirubin Total 0.5 <=1.2 mg/dL    Alkaline Phosphatase 199 (H) 40 - 150 U/L    AST 33 0 - 45 U/L    ALT 23 0 - 50 U/L    Bilirubin Direct <0.20 0.00 - 0.30 mg/dL   Result Value Ref Range    Lipase 85 (H) 13 - 60 U/L   Result Value Ref Range    Magnesium 2.1 1.7 - 2.3 mg/dL   UA  with Microscopic reflex to Culture    Specimen: Urine, Clean Catch   Result Value Ref Range    Color Urine Light Yellow Colorless, Straw, Light Yellow, Yellow    Appearance Urine Clear Clear    Glucose Urine Negative Negative mg/dL    Bilirubin Urine Negative Negative    Ketones Urine Negative Negative mg/dL    Specific Gravity Urine 1.023 1.001 - 1.030    Blood Urine 1.0 mg/dL (A) Negative    pH Urine 6.0 5.0 - 7.0    Protein Albumin Urine Negative Negative mg/dL    Urobilinogen Urine <2.0 <2.0 mg/dL    Nitrite Urine Negative Negative    Leukocyte Esterase Urine 25 Lizet/uL (A) Negative    Mucus Urine Present (A) None Seen /LPF    RBC Urine 3 (H) <=2 /HPF    WBC Urine 2 <=5 /HPF    Squamous Epithelials Urine 1 <=1 /HPF   CBC with platelets and differential   Result Value Ref Range    WBC Count 8.8 4.0 - 11.0 10e3/uL    RBC Count 5.48 (H) 3.80 - 5.20 10e6/uL    Hemoglobin 14.2 11.7 - 15.7 g/dL    Hematocrit 44.5 35.0 - 47.0 %    MCV 81 78 - 100 fL    MCH 25.9 (L) 26.5 - 33.0 pg    MCHC 31.9 31.5 - 36.5 g/dL    RDW 14.8 10.0 - 15.0 %    Platelet Count 269 150 - 450 10e3/uL    % Neutrophils 72 %    % Lymphocytes 20 %    % Monocytes 5 %    % Eosinophils 2 %    % Basophils 1 %    % Immature Granulocytes 1 %    NRBCs per 100 WBC 0 <1 /100    Absolute Neutrophils 6.4 1.6 - 8.3 10e3/uL    Absolute Lymphocytes 1.8 0.8 - 5.3 10e3/uL    Absolute Monocytes 0.5 0.0 - 1.3 10e3/uL    Absolute Eosinophils 0.1 0.0 - 0.7 10e3/uL    Absolute Basophils 0.0 0.0 - 0.2 10e3/uL    Absolute Immature Granulocytes 0.0 <=0.4 10e3/uL    Absolute NRBCs 0.0 10e3/uL       RADIOLOGY:  Reviewed all pertinent imaging. Please see official radiology report.  CT Abdomen Pelvis w Contrast   Final Result   IMPRESSION:   1.  No evidence of acute pathology in the abdomen and pelvis. No significant peripancreatic fat stranding to suggest the presence of acute pancreatitis.   2.  Hypodense material within the endometrial cavity, indeterminate, could represent  mildly thickened endometrial stripe vs retained products of conception, can be further evaluated with pelvic ultrasound if clinically warranted.        Paco Siu M.D.  Emergency Medicine  [facility] EMERGENCY DEPARTMENT  [address] [place] [id number] [phone]  Dept: [phone]       Paco Siu MD  10/26/24 035

## 2024-10-26 NOTE — ED NOTES
Pt is a/o x4, spouse at bedside, denies pain, states pain is relieved after dilaudid iv given, VSS, pt ok to discharge with prescriptions.

## 2024-10-26 NOTE — DISCHARGE INSTRUCTIONS
Liquid diet for 24-hour    Take Zofran as needed for nausea, take Carafate as prescribed.  Take oxycodone as needed for pain.    Call your OB/GYN to discuss the endometrial thickening seen on CT scan, you may need an ultrasound or further evaluation for this as an outpatient

## 2024-10-27 PROBLEM — R10.13 ABDOMINAL PAIN, EPIGASTRIC: Status: ACTIVE | Noted: 2024-10-27

## 2024-10-27 PROBLEM — R79.89 ABNORMAL LFTS: Status: ACTIVE | Noted: 2024-10-27

## 2024-10-27 LAB
ALBUMIN SERPL BCG-MCNC: 4 G/DL (ref 3.5–5.2)
ALBUMIN SERPL BCG-MCNC: 4.2 G/DL (ref 3.5–5.2)
ALP SERPL-CCNC: 399 U/L (ref 40–150)
ALP SERPL-CCNC: 431 U/L (ref 40–150)
ALT SERPL W P-5'-P-CCNC: 236 U/L (ref 0–50)
ALT SERPL W P-5'-P-CCNC: 298 U/L (ref 0–50)
ANION GAP SERPL CALCULATED.3IONS-SCNC: 12 MMOL/L (ref 7–15)
ANION GAP SERPL CALCULATED.3IONS-SCNC: 15 MMOL/L (ref 7–15)
AST SERPL W P-5'-P-CCNC: 273 U/L (ref 0–45)
AST SERPL W P-5'-P-CCNC: 335 U/L (ref 0–45)
BASOPHILS # BLD AUTO: 0 10E3/UL (ref 0–0.2)
BASOPHILS NFR BLD AUTO: 1 %
BILIRUB SERPL-MCNC: 2.2 MG/DL
BILIRUB SERPL-MCNC: 2.7 MG/DL
BUN SERPL-MCNC: 10.1 MG/DL (ref 6–20)
BUN SERPL-MCNC: 9.5 MG/DL (ref 6–20)
CALCIUM SERPL-MCNC: 9.2 MG/DL (ref 8.8–10.4)
CALCIUM SERPL-MCNC: 9.3 MG/DL (ref 8.8–10.4)
CHLORIDE SERPL-SCNC: 103 MMOL/L (ref 98–107)
CHLORIDE SERPL-SCNC: 105 MMOL/L (ref 98–107)
CREAT SERPL-MCNC: 0.81 MG/DL (ref 0.51–0.95)
CREAT SERPL-MCNC: 0.84 MG/DL (ref 0.51–0.95)
CRP SERPL-MCNC: 6.4 MG/L
EGFRCR SERPLBLD CKD-EPI 2021: >90 ML/MIN/1.73M2
EGFRCR SERPLBLD CKD-EPI 2021: >90 ML/MIN/1.73M2
EOSINOPHIL # BLD AUTO: 0.1 10E3/UL (ref 0–0.7)
EOSINOPHIL NFR BLD AUTO: 1 %
ERYTHROCYTE [DISTWIDTH] IN BLOOD BY AUTOMATED COUNT: 14.9 % (ref 10–15)
ERYTHROCYTE [SEDIMENTATION RATE] IN BLOOD BY WESTERGREN METHOD: 19 MM/HR (ref 0–20)
ERYTHROCYTE [SEDIMENTATION RATE] IN BLOOD BY WESTERGREN METHOD: 21 MM/HR (ref 0–20)
GLUCOSE SERPL-MCNC: 80 MG/DL (ref 70–99)
GLUCOSE SERPL-MCNC: 92 MG/DL (ref 70–99)
HCO3 SERPL-SCNC: 22 MMOL/L (ref 22–29)
HCO3 SERPL-SCNC: 25 MMOL/L (ref 22–29)
HCT VFR BLD AUTO: 44.9 % (ref 35–47)
HGB BLD-MCNC: 14.2 G/DL (ref 11.7–15.7)
HOLD SPECIMEN: NORMAL
IMM GRANULOCYTES # BLD: 0 10E3/UL
IMM GRANULOCYTES NFR BLD: 1 %
INR PPP: 0.99 (ref 0.85–1.15)
LIPASE SERPL-CCNC: 35 U/L (ref 13–60)
LYMPHOCYTES # BLD AUTO: 1.2 10E3/UL (ref 0.8–5.3)
LYMPHOCYTES NFR BLD AUTO: 19 %
MCH RBC QN AUTO: 25.7 PG (ref 26.5–33)
MCHC RBC AUTO-ENTMCNC: 31.6 G/DL (ref 31.5–36.5)
MCV RBC AUTO: 81 FL (ref 78–100)
MONOCYTES # BLD AUTO: 0.3 10E3/UL (ref 0–1.3)
MONOCYTES NFR BLD AUTO: 5 %
NEUTROPHILS # BLD AUTO: 4.4 10E3/UL (ref 1.6–8.3)
NEUTROPHILS NFR BLD AUTO: 73 %
NRBC # BLD AUTO: 0 10E3/UL
NRBC BLD AUTO-RTO: 0 /100
PLATELET # BLD AUTO: 241 10E3/UL (ref 150–450)
POTASSIUM SERPL-SCNC: 3.4 MMOL/L (ref 3.4–5.3)
POTASSIUM SERPL-SCNC: 3.5 MMOL/L (ref 3.4–5.3)
PROCALCITONIN SERPL IA-MCNC: 0.09 NG/ML
PROT SERPL-MCNC: 7.1 G/DL (ref 6.4–8.3)
PROT SERPL-MCNC: 7.2 G/DL (ref 6.4–8.3)
RBC # BLD AUTO: 5.52 10E6/UL (ref 3.8–5.2)
RHEUMATOID FACT SERPL-ACNC: <10 IU/ML
SODIUM SERPL-SCNC: 140 MMOL/L (ref 135–145)
SODIUM SERPL-SCNC: 142 MMOL/L (ref 135–145)
TSH SERPL DL<=0.005 MIU/L-ACNC: 3.2 UIU/ML (ref 0.3–4.2)
WBC # BLD AUTO: 5.9 10E3/UL (ref 4–11)

## 2024-10-27 PROCEDURE — 250N000011 HC RX IP 250 OP 636: Mod: JW | Performed by: EMERGENCY MEDICINE

## 2024-10-27 PROCEDURE — 96375 TX/PRO/DX INJ NEW DRUG ADDON: CPT

## 2024-10-27 PROCEDURE — 82040 ASSAY OF SERUM ALBUMIN: CPT | Performed by: INTERNAL MEDICINE

## 2024-10-27 PROCEDURE — 96374 THER/PROPH/DIAG INJ IV PUSH: CPT

## 2024-10-27 PROCEDURE — 36415 COLL VENOUS BLD VENIPUNCTURE: CPT | Performed by: INTERNAL MEDICINE

## 2024-10-27 PROCEDURE — 86381 MITOCHONDRIAL ANTIBODY EACH: CPT | Performed by: INTERNAL MEDICINE

## 2024-10-27 PROCEDURE — 84155 ASSAY OF PROTEIN SERUM: CPT | Performed by: INTERNAL MEDICINE

## 2024-10-27 PROCEDURE — 84443 ASSAY THYROID STIM HORMONE: CPT | Performed by: INTERNAL MEDICINE

## 2024-10-27 PROCEDURE — 86038 ANTINUCLEAR ANTIBODIES: CPT | Performed by: INTERNAL MEDICINE

## 2024-10-27 PROCEDURE — 250N000011 HC RX IP 250 OP 636: Performed by: INTERNAL MEDICINE

## 2024-10-27 PROCEDURE — 99207 PR APP CREDIT; MD BILLING SHARED VISIT: CPT | Performed by: INTERNAL MEDICINE

## 2024-10-27 PROCEDURE — 85652 RBC SED RATE AUTOMATED: CPT | Performed by: INTERNAL MEDICINE

## 2024-10-27 PROCEDURE — 85610 PROTHROMBIN TIME: CPT | Performed by: INTERNAL MEDICINE

## 2024-10-27 PROCEDURE — 82784 ASSAY IGA/IGD/IGG/IGM EACH: CPT | Performed by: INTERNAL MEDICINE

## 2024-10-27 PROCEDURE — 86015 ACTIN ANTIBODY EACH: CPT | Performed by: INTERNAL MEDICINE

## 2024-10-27 PROCEDURE — 99223 1ST HOSP IP/OBS HIGH 75: CPT | Performed by: INTERNAL MEDICINE

## 2024-10-27 PROCEDURE — 85025 COMPLETE CBC W/AUTO DIFF WBC: CPT | Performed by: INTERNAL MEDICINE

## 2024-10-27 PROCEDURE — 86140 C-REACTIVE PROTEIN: CPT | Performed by: INTERNAL MEDICINE

## 2024-10-27 PROCEDURE — 250N000013 HC RX MED GY IP 250 OP 250 PS 637: Performed by: INTERNAL MEDICINE

## 2024-10-27 PROCEDURE — 120N000001 HC R&B MED SURG/OB

## 2024-10-27 PROCEDURE — 84145 PROCALCITONIN (PCT): CPT | Performed by: INTERNAL MEDICINE

## 2024-10-27 PROCEDURE — 86431 RHEUMATOID FACTOR QUANT: CPT | Performed by: INTERNAL MEDICINE

## 2024-10-27 RX ORDER — AMOXICILLIN 250 MG
1 CAPSULE ORAL 2 TIMES DAILY PRN
Status: DISCONTINUED | OUTPATIENT
Start: 2024-10-27 | End: 2024-10-29 | Stop reason: HOSPADM

## 2024-10-27 RX ORDER — AMOXICILLIN 250 MG
2 CAPSULE ORAL 2 TIMES DAILY PRN
Status: DISCONTINUED | OUTPATIENT
Start: 2024-10-27 | End: 2024-10-29 | Stop reason: HOSPADM

## 2024-10-27 RX ORDER — NALOXONE HYDROCHLORIDE 0.4 MG/ML
0.4 INJECTION, SOLUTION INTRAMUSCULAR; INTRAVENOUS; SUBCUTANEOUS
Status: DISCONTINUED | OUTPATIENT
Start: 2024-10-27 | End: 2024-10-29 | Stop reason: HOSPADM

## 2024-10-27 RX ORDER — NALOXONE HYDROCHLORIDE 0.4 MG/ML
0.2 INJECTION, SOLUTION INTRAMUSCULAR; INTRAVENOUS; SUBCUTANEOUS
Status: DISCONTINUED | OUTPATIENT
Start: 2024-10-27 | End: 2024-10-29 | Stop reason: HOSPADM

## 2024-10-27 RX ORDER — ONDANSETRON 2 MG/ML
4 INJECTION INTRAMUSCULAR; INTRAVENOUS EVERY 6 HOURS PRN
Status: DISCONTINUED | OUTPATIENT
Start: 2024-10-27 | End: 2024-10-29 | Stop reason: HOSPADM

## 2024-10-27 RX ORDER — PROCHLORPERAZINE MALEATE 5 MG/1
5 TABLET ORAL EVERY 6 HOURS PRN
Status: DISCONTINUED | OUTPATIENT
Start: 2024-10-27 | End: 2024-10-29 | Stop reason: HOSPADM

## 2024-10-27 RX ORDER — ONDANSETRON 4 MG/1
4 TABLET, ORALLY DISINTEGRATING ORAL EVERY 6 HOURS PRN
Status: DISCONTINUED | OUTPATIENT
Start: 2024-10-27 | End: 2024-10-29 | Stop reason: HOSPADM

## 2024-10-27 RX ORDER — HYDROMORPHONE HCL IN WATER/PF 6 MG/30 ML
0.2 PATIENT CONTROLLED ANALGESIA SYRINGE INTRAVENOUS
Status: DISCONTINUED | OUTPATIENT
Start: 2024-10-27 | End: 2024-10-27

## 2024-10-27 RX ORDER — HYDROMORPHONE HCL IN WATER/PF 6 MG/30 ML
0.4 PATIENT CONTROLLED ANALGESIA SYRINGE INTRAVENOUS
Status: DISCONTINUED | OUTPATIENT
Start: 2024-10-27 | End: 2024-10-29 | Stop reason: HOSPADM

## 2024-10-27 RX ORDER — DEXTROSE, SODIUM CHLORIDE, SODIUM LACTATE, POTASSIUM CHLORIDE, AND CALCIUM CHLORIDE 5; .6; .31; .03; .02 G/100ML; G/100ML; G/100ML; G/100ML; G/100ML
INJECTION, SOLUTION INTRAVENOUS CONTINUOUS
Status: DISCONTINUED | OUTPATIENT
Start: 2024-10-27 | End: 2024-10-27

## 2024-10-27 RX ORDER — CALCIUM CARBONATE 500 MG/1
1000 TABLET, CHEWABLE ORAL 4 TIMES DAILY PRN
Status: DISCONTINUED | OUTPATIENT
Start: 2024-10-27 | End: 2024-10-29 | Stop reason: HOSPADM

## 2024-10-27 RX ORDER — DIPHENHYDRAMINE HYDROCHLORIDE 50 MG/ML
25 INJECTION INTRAMUSCULAR; INTRAVENOUS EVERY 6 HOURS PRN
Status: DISCONTINUED | OUTPATIENT
Start: 2024-10-27 | End: 2024-10-27

## 2024-10-27 RX ORDER — LORATADINE 10 MG/1
10 TABLET ORAL DAILY
Status: COMPLETED | OUTPATIENT
Start: 2024-10-27 | End: 2024-10-29

## 2024-10-27 RX ORDER — BUPROPION HYDROCHLORIDE 150 MG/1
300 TABLET ORAL EVERY MORNING
Status: DISCONTINUED | OUTPATIENT
Start: 2024-10-28 | End: 2024-10-29 | Stop reason: HOSPADM

## 2024-10-27 RX ORDER — LIDOCAINE 40 MG/G
CREAM TOPICAL
Status: DISCONTINUED | OUTPATIENT
Start: 2024-10-27 | End: 2024-10-29 | Stop reason: HOSPADM

## 2024-10-27 RX ADMIN — HYDROMORPHONE HYDROCHLORIDE 1 MG: 2 TABLET ORAL at 21:08

## 2024-10-27 RX ADMIN — HYDROMORPHONE HYDROCHLORIDE 0.2 MG: 0.2 INJECTION, SOLUTION INTRAMUSCULAR; INTRAVENOUS; SUBCUTANEOUS at 04:12

## 2024-10-27 RX ADMIN — SODIUM CHLORIDE, SODIUM LACTATE, POTASSIUM CHLORIDE, CALCIUM CHLORIDE AND DEXTROSE MONOHYDRATE: 5; 600; 310; 30; 20 INJECTION, SOLUTION INTRAVENOUS at 04:09

## 2024-10-27 RX ADMIN — LORATADINE 10 MG: 10 TABLET ORAL at 18:05

## 2024-10-27 RX ADMIN — FAMOTIDINE 20 MG: 10 INJECTION, SOLUTION INTRAVENOUS at 07:38

## 2024-10-27 RX ADMIN — HYDROMORPHONE HYDROCHLORIDE 0.4 MG: 0.2 INJECTION, SOLUTION INTRAMUSCULAR; INTRAVENOUS; SUBCUTANEOUS at 13:34

## 2024-10-27 RX ADMIN — DIPHENHYDRAMINE HYDROCHLORIDE 25 MG: 50 INJECTION, SOLUTION INTRAMUSCULAR; INTRAVENOUS at 00:06

## 2024-10-27 RX ADMIN — HYDROMORPHONE HYDROCHLORIDE 0.2 MG: 0.2 INJECTION, SOLUTION INTRAMUSCULAR; INTRAVENOUS; SUBCUTANEOUS at 07:42

## 2024-10-27 RX ADMIN — METOCLOPRAMIDE 10 MG: 5 INJECTION, SOLUTION INTRAMUSCULAR; INTRAVENOUS at 00:09

## 2024-10-27 RX ADMIN — HYDROMORPHONE HYDROCHLORIDE 0.4 MG: 0.2 INJECTION, SOLUTION INTRAMUSCULAR; INTRAVENOUS; SUBCUTANEOUS at 18:06

## 2024-10-27 RX ADMIN — HYDROMORPHONE HYDROCHLORIDE 0.2 MG: 0.2 INJECTION, SOLUTION INTRAMUSCULAR; INTRAVENOUS; SUBCUTANEOUS at 11:09

## 2024-10-27 RX ADMIN — HYDROMORPHONE HYDROCHLORIDE 0.2 MG: 0.2 INJECTION, SOLUTION INTRAMUSCULAR; INTRAVENOUS; SUBCUTANEOUS at 16:05

## 2024-10-27 RX ADMIN — PROCHLORPERAZINE EDISYLATE 5 MG: 5 INJECTION INTRAMUSCULAR; INTRAVENOUS at 20:08

## 2024-10-27 RX ADMIN — ONDANSETRON 4 MG: 4 TABLET, ORALLY DISINTEGRATING ORAL at 15:59

## 2024-10-27 RX ADMIN — HYDROMORPHONE HYDROCHLORIDE 0.5 MG: 1 INJECTION, SOLUTION INTRAMUSCULAR; INTRAVENOUS; SUBCUTANEOUS at 00:12

## 2024-10-27 NOTE — PROGRESS NOTES
Patient was admitted by my colleague earlier this morning.  Admission H&P reviewed. Agree with the assessments and plan.    Amanda Miller is a 24 year old female with a medical history significant for recent history of acute pancreatitis at 31 weeks of pregnancy due to cholelithiasis status post cholecystectomy who is admitted with right upper quadrant pain and worsening liver function.            Patient Active Problem List   Diagnosis    Depression    Anxiety    At risk for venous thromboembolism (VTE)    BMI 40.0-44.9, adult (H)    Positive depression screening    Echogenic intracardiac focus of fetus on prenatal ultrasound    Normal delivery    Acute biliary pancreatitis without infection or necrosis    Cholelithiasis    Cholecystitis    Epigastric pain    Encounter for triage in pregnant patient    Acute pancreatitis, unspecified complication status, unspecified pancreatitis type    High-risk pregnancy, third trimester    Abdominal pain, epigastric    Abnormal LFTs        1. Right Upper Quadrant (RUQ) Tenderness    Etiology: Likely related to biliary issues post-cholecystectomy, with associated worsening liver function tests.  Etiology unclear.  Because this has been related to her pregnancy twice, it is probably with looking into conditions such as autoimmune causes.  Will check thyroid function and ZOFIA.    Plan: Patient admitted for ERCP to further evaluate biliary ducts for any residual or obstructive pathology. No signs of acute pancreatitis at this time. Keep NPO for the procedure, continue pain control, and await GI input.  Check procalcitonin, ESR and CRP. May consider a fibroscan as well. Defer to GI     2. Cholelithiasis Status Post Cholecystectomy    Etiology: ?History of gallstones with prior cholecystectomy.  Patient denied presence of gallstones.  Yesterday s CT showed no dilation of the common bile duct.    Plan: Case reviewed with GI, who recommended ERCP for further assessment. Hold off on  additional imaging for now unless indicated by findings from ERCP or symptom changes.     3.? Lower Abdominal Pain- No major complaints. Gyn consulted  to evaluate.     Etiology: CT showed hypodense material within the endometrial cavity, which may indicate a mildly thickened endometrial stripe or retained products of conception.  Plan: consider a pelvic ultrasound to further assess endometrial findings. Consult gynecology for evaluation and any additional management based on ultrasound findings.    4. Depression    Etiology: Chronic condition managed with pharmacotherapy.  Plan: Continue Wellbutrin as prescribed for mood stabilization.  Anxiety    Etiology: Chronic condition, co-managed with Wellbutrin.  Plan: Continue Wellbutrin and provide emotional support as needed during hospitalization.     5. Postpartum Status    Etiology: Recent spontaneous vaginal delivery, currently without complications.  Plan: No active concerns apart from abnormal CT findings in the pelvic region. Follow up on ultrasound and gynecology recommendations as indicated.     6.Other Medical Problems    Plan: Remain stable and do not require changes to current management at this time.  The patient and family have been informed of the treatment plan, with multidisciplinary input from GI and gynecology for comprehensive evaluation.              Diet: NPO for Medical/Clinical Reasons Except for: Meds    DVT Prophylaxis: Pneumatic Compression Devices  Keys Catheter: Not present  Lines: None     Cardiac Monitoring: None  Code Status:  Full code

## 2024-10-27 NOTE — ED PROVIDER NOTES
EMERGENCY DEPARTMENT ENCOUNTER      NAME: Amanda Miller  AGE: 24 year old female  YOB: 2000  MRN: [id number]  EVALUATION DATE & TIME: 10/26/2024 10:43 PM    PCP: Carla Mims    ED PROVIDER: Shilpi Pinedo MD    Chief Complaint   Patient presents with    Abdominal Pain    bilateral shoulder blades pain         FINAL IMPRESSION:  1. Abdominal pain, epigastric    2. Abnormal LFTs          ED COURSE & MEDICAL DECISION MAKING:    Pertinent Labs & Imaging studies reviewed. (See chart for details)  24 year old female with history of anxiety/depression, previous pancreatitis postcholecystectomy during her recent pregnancy at 31 weeks who presents to the Emergency Department for evaluation of recurrent epigastric abdominal pain radiating slightly through to the back with nausea.  Patient states is similar to previous episode of pancreatitis though labs yesterday when she presented to the ER for the same complaints did not show any evidence of lipase elevation.  Per chart review patient had a similar presentation in August.  Had initial ED visit on 8/14 with normal labs and then on 8/15 came back with lipase 62 down was 1400.  Differential includes pancreatitis, hepatobiliary pathology, GERD, gastritis.  Her CT yesterday showed a slightly thickened endometrial stripe but she does not have any lower abdominal pain and her vaginal bleeding is like a light menses and I do not think that this is causing any of her pain.    Patient initially seen evaluate myself in triage area due to boarding crisis.  IV established, blood obtained.  Given Benadryl/Reglan, Dilaudid.  CBC, CMP, lipase reveals a normal lipase but abnormal LFTs with alk phos of 399, AST of 273, ALT of 236 and T. bili of 2.2 which is newly abnormal versus 22 hours ago.  Very similar to when she was hospitalized in August for pancreatitis.  At that time an MRCP was performed, no choledocholithiasis was visualized.  Case was discussed with GI  tonight, did not feel that a repeat MRCP would be useful, but recommends n.p.o. and admission.      ED Course as of 10/27/24 0108   Sun Oct 27, 2024   0018 Lipase: 35   0018 Comprehensive metabolic panel(!)  LFT abnormalities are new versus previous from yesterday   0023 Spoke w Dr. Blair, GI   0108 Admitted to Dr. Fraga       Medical Decision Making  Obtained supplemental history:Supplemental history obtained?: Family Member/Significant Other  Reviewed external records: External records reviewed?: Inpatient Record: Several recent discharge summary  Care impacted by chronic illness:Mental Health  Did you consider but not order tests?: Work up considered but not performed and documented in chart, if applicable  Did you interpret images independently?: Independent interpretation of ECG and images noted in documentation, when applicable.  Consultation discussion with other provider:Did you involve another provider (consultant, , pharmacy, etc.)?: I discussed the care with another health care provider, see documentation for details.  Admit.    MIPS: Not Applicable      At the conclusion of the encounter I discussed the results of all of the tests and the disposition. The questions were answered. The patient or family acknowledged understanding and was agreeable with the care plan.      MEDICATIONS GIVEN IN THE EMERGENCY:  Medications   metoclopramide (REGLAN) injection 10 mg (10 mg Intravenous $Given 10/27/24 0009)   diphenhydrAMINE (BENADRYL) injection 25 mg (25 mg Intravenous $Given 10/27/24 0006)   HYDROmorphone (DILAUDID) injection 0.5 mg (0.5 mg Intravenous $Given 10/27/24 0012)       NEW PRESCRIPTIONS STARTED AT TODAY'S ER VISIT  New Prescriptions    No medications on file          =================================================================    HPI    Patient information was obtained from: Patient and spouse    Use of Intrepreter: N/A      Amanda KUO Paul is a 24 year old female with pertinent medical  "history of anxiety/depression, previous pancreatitis postcholecystectomy who presents abdominal pain.  Patient had recent spontaneous vaginal delivery at 39 weeks on 10/9/2024.  Per chart review of her most recent 2 discharge summaries she was hospitalized at 31 weeks with pancreatitis.  Has a history of previous cholecystectomy and there is no evidence of any persistent stone on MRCP.  Her lipase was downtrending and she was discharged home.  Vaginal delivery uncomplicated.  Yesterday she had recurrent epigastric abdominal pain that radiated slightly to the back similar to her previous episode of pancreatitis and was seen here in the emergency department with reassuring labs and CT imaging.  Discharged home with Zofran, Carafate and oxycodone.  She took the medications this morning and afternoon with relief and was able to take a nap.  However at 5 PM abdominal pain was persistent and not responding to oxycodone.  Called her PCP who instructed her to take an additional dose of oxycodone at 8 PM and come to the ER.  She describes her abdominal pain is epigastric and slightly right upper quadrant.  Radiates minimally through to the back and is very similar to her previous episode of pancreatitis.  Nausea and dry heaving but no vomiting.  States her vaginal bleeding postpartum is waxing and waning but still like a light period.  She does not have any lower abdominal pain.      PAST MEDICAL HISTORY:  Past Medical History:   Diagnosis Date    Anxiety     Depression     Depressive disorder     no medications currently, per pt \"midwife would like me to start again after pregnancy\"    Obesity     Urinary tract infection        PAST SURGICAL HISTORY:  Past Surgical History:   Procedure Laterality Date    CHOLANGIOGRAM N/A 6/1/2023    Procedure: INTRAOPERATIVE CHOLANGIOGRAMS;  Surgeon: Dimas Cloud DO;  Location: [facility] OR    ESOPHAGOSCOPY, GASTROSCOPY, DUODENOSCOPY (EGD), COMBINED N/A 6/8/2023    Procedure: " ENDOSCOPIC ULTRASOUND;  Surgeon: Eleuterio Kline MD;  Location: [facility] OR    LAPAROSCOPIC CHOLECYSTECTOMY N/A 2023    Procedure: CHOLECYSTECTOMY, ROBOT-ASSISTED, LAPAROSCOPIC, USING DA LINDSAY XI,;  Surgeon: Dimas Cloud DO;  Location: [facility] OR    TONSILLECTOMY      WISDOM TOOTH EXTRACTION         CURRENT MEDICATIONS:    Prior to Admission Medications   Prescriptions Last Dose Informant Patient Reported? Taking?   Feeding Supplies (AMEDA MILK STORAGE BAGS) MISC   No No   Si Units as needed (for storage of expressed/pumped breast milk)   acetaminophen (TYLENOL) 500 MG tablet   No No   Sig: Take 1-2 tablets (500-1,000 mg) by mouth every 8 hours as needed for mild pain or other (and adjunct with moderate or severe pain or per patient request).   buPROPion (WELLBUTRIN XL) 300 MG 24 hr tablet   Yes No   Sig: Take 300 mg by mouth every morning   docusate sodium (COLACE) 100 MG capsule   No No   Sig: Take 1 capsule (100 mg) by mouth 2 times daily.   famotidine (PEPCID) 20 MG tablet   Yes No   Sig: Take 20 mg by mouth 2 times daily   ibuprofen (ADVIL/MOTRIN) 200 MG tablet   No No   Sig: Take 3 tablets (600 mg) by mouth every 8 hours as needed for other or moderate pain (cramping).   ondansetron (ZOFRAN ODT) 4 MG ODT tab   No No   Sig: Take 1 tablet (4 mg) by mouth every 6 hours as needed for nausea.   oxyCODONE (ROXICODONE) 5 MG tablet   No No   Sig: Take 1 tablet (5 mg) by mouth every 6 hours as needed for severe pain.   sucralfate (CARAFATE) 1 GM tablet   No No   Sig: Take 1 tablet (1 g) by mouth 4 times daily.      Facility-Administered Medications: None       ALLERGIES:  No Known Allergies    FAMILY HISTORY:  Family History   Problem Relation Age of Onset    Depression Mother     Depression Father     Cancer Paternal Grandmother        SOCIAL HISTORY:  Social History     Tobacco Use    Smoking status: Never     Passive exposure: Never    Smokeless tobacco: Never   Vaping Use    Vaping status:  Never Used   Substance Use Topics    Alcohol use: Not Currently    Drug use: Never        VITALS:  Patient Vitals for the past 24 hrs:   BP Temp Temp src Pulse Resp SpO2 Height Weight   10/27/24 0046 106/69 -- -- 56 -- 97 % -- --   10/27/24 0030 118/66 -- -- 63 -- 95 % -- --   10/26/24 2349 104/60 -- -- 57 -- 99 % -- --   10/26/24 2239 98/55 97.8  F (36.6  C) Oral 60 20 97 % 1.524 m (5') 89.4 kg (197 lb)       PHYSICAL EXAM    General Appearance: Well-appearing, well-nourished, no acute distress   Head:  Normocephalic  Cardio:  Regular rate and rhythm  Pulm:  No respiratory distress  Back:   No CVA tenderness, normal ROM  Abdomen:  Soft, mild epigastric tenderness, non distended,no rebound or guarding.  Extremities: Normal gait  Skin:  Skin warm, dry, no rashes  Neuro:  Alert and oriented ×3     RADIOLOGY/LABS:  Reviewed all pertinent imaging. Please see official radiology report. All pertinent labs reviewed and interpreted.    Results for orders placed or performed during the hospital encounter of 10/26/24   Comprehensive metabolic panel   Result Value Ref Range    Sodium 140 135 - 145 mmol/L    Potassium 3.5 3.4 - 5.3 mmol/L    Carbon Dioxide (CO2) 25 22 - 29 mmol/L    Anion Gap 12 7 - 15 mmol/L    Urea Nitrogen 10.1 6.0 - 20.0 mg/dL    Creatinine 0.84 0.51 - 0.95 mg/dL    GFR Estimate >90 >60 mL/min/1.73m2    Calcium 9.2 8.8 - 10.4 mg/dL    Chloride 103 98 - 107 mmol/L    Glucose 92 70 - 99 mg/dL    Alkaline Phosphatase 399 (H) 40 - 150 U/L     (H) 0 - 45 U/L     (H) 0 - 50 U/L    Protein Total 7.1 6.4 - 8.3 g/dL    Albumin 4.2 3.5 - 5.2 g/dL    Bilirubin Total 2.2 (H) <=1.2 mg/dL   Result Value Ref Range    Lipase 35 13 - 60 U/L   CBC with platelets and differential   Result Value Ref Range    WBC Count 5.8 4.0 - 11.0 10e3/uL    RBC Count 5.24 (H) 3.80 - 5.20 10e6/uL    Hemoglobin 13.8 11.7 - 15.7 g/dL    Hematocrit 42.3 35.0 - 47.0 %    MCV 81 78 - 100 fL    MCH 26.3 (L) 26.5 - 33.0 pg    MCHC  32.6 31.5 - 36.5 g/dL    RDW 14.6 10.0 - 15.0 %    Platelet Count 256 150 - 450 10e3/uL    % Neutrophils 77 %    % Lymphocytes 16 %    % Monocytes 5 %    % Eosinophils 1 %    % Basophils 1 %    % Immature Granulocytes 0 %    NRBCs per 100 WBC 0 <1 /100    Absolute Neutrophils 4.5 1.6 - 8.3 10e3/uL    Absolute Lymphocytes 0.9 0.8 - 5.3 10e3/uL    Absolute Monocytes 0.3 0.0 - 1.3 10e3/uL    Absolute Eosinophils 0.1 0.0 - 0.7 10e3/uL    Absolute Basophils 0.0 0.0 - 0.2 10e3/uL    Absolute Immature Granulocytes 0.0 <=0.4 10e3/uL    Absolute NRBCs 0.0 10e3/uL             Shilpi Pinedo MD  Emergency Medicine  [facility] EMERGENCY DEPARTMENT  [facility] [facility] [id number] [phone]  Dept: [phone]     Shilpi Pinedo MD  10/27/24 0108

## 2024-10-27 NOTE — MEDICATION SCRIBE - ADMISSION MEDICATION HISTORY
Medication Scribe Admission Medication History    Admission medication history is complete. The information provided in this note is only as accurate as the sources available at the time of the update.    Information Source(s): Patient via in-person    Pertinent Information: Patient states that she takes all of her medications at bedtime.  Patient states that she is currently taking Famotidine 20 mg tablet 1 hs instead of 1 bid    Changes made to PTA medication list:  Added: Calcium-Magnesium, Vitamin D, Vitamin K (per patient)  Deleted: Docusate (per patient)  Changed: Famotidine from 1bid to 1hs (per patient)    Allergies reviewed with patient and updates made in EHR: yes    Medication History Completed By: Shira Wasserman 10/27/2024 1:18 AM    PTA Med List   Medication Sig Last Dose/Taking    CALCIUM CARB-MAGNESIUM CARB PO Take 1 tablet by mouth at bedtime. 10/25/2024 Bedtime    VITAMIN D PO Take 1 tablet by mouth at bedtime. 10/25/2024 Bedtime    VITAMIN K PO Take 1 tablet by mouth at bedtime. 10/25/2024 Bedtime    acetaminophen (TYLENOL) 500 MG tablet Take 1-2 tablets (500-1,000 mg) by mouth every 8 hours as needed for mild pain or other (and adjunct with moderate or severe pain or per patient request). Unknown    buPROPion (WELLBUTRIN XL) 300 MG 24 hr tablet Take 300 mg by mouth every morning 10/25/2024 Bedtime    famotidine (PEPCID) 20 MG tablet Take 20 mg by mouth at bedtime. 10/25/2024 Bedtime    ibuprofen (ADVIL/MOTRIN) 200 MG tablet Take 3 tablets (600 mg) by mouth every 8 hours as needed for other or moderate pain (cramping). Unknown    ondansetron (ZOFRAN ODT) 4 MG ODT tab Take 1 tablet (4 mg) by mouth every 6 hours as needed for nausea. 10/26/2024 Evening    oxyCODONE (ROXICODONE) 5 MG tablet Take 1 tablet (5 mg) by mouth every 6 hours as needed for severe pain. 10/26/2024 Evening    sucralfate (CARAFATE) 1 GM tablet Take 1 tablet (1 g) by mouth 4 times daily. 10/26/2024 Evening

## 2024-10-27 NOTE — ED NOTES
Patients pain is gone and relieved by the dilaudid given earlier--GI came to see patient-plan for liver biopsy tomorrow--IV fluids discontinued--patient given some fluids PO--food tray ordered

## 2024-10-27 NOTE — ED TRIAGE NOTES
Pt was here yesterday and discharge with prescriptions and returning for ongoing symptoms: upper mid abd pain and bilateral shoulder blade pain. Pt took oxycodone at 5pm with no relief and called her PCP and was told to take another at 8pm without relief as well and was informed to come to the ER.      Triage Assessment (Adult)       Row Name 10/26/24 1303          Triage Assessment    Airway WDL WDL        Respiratory WDL    Respiratory WDL WDL        Cardiac WDL    Cardiac WDL WDL                     
80

## 2024-10-27 NOTE — H&P
[facility]    History and Physical - Hospitalist Service       Date of Admission:  10/26/2024    Assessment & Plan   Amanda Miller is a 24 year old female with a medical history significant for recent history of acute pancreatitis at 31 weeks of pregnancy due to cholelithiasis status post cholecystectomy who is admitted with right upper quadrant pain and worsening liver function.      Patient Active Problem List   Diagnosis    Depression    Anxiety    At risk for venous thromboembolism (VTE)    BMI 40.0-44.9, adult (H)    Positive depression screening    Echogenic intracardiac focus of fetus on prenatal ultrasound    Normal delivery    Acute biliary pancreatitis without infection or necrosis    Cholelithiasis    Cholecystitis    Epigastric pain    Encounter for triage in pregnant patient    Acute pancreatitis, unspecified complication status, unspecified pancreatitis type    High-risk pregnancy, third trimester    Abdominal pain, epigastric    Abnormal LFTs        1. Right Upper Quadrant (RUQ) Tenderness    Etiology: Likely related to biliary issues post-cholecystectomy, with associated worsening liver function tests.  Etiology unclear.  Because this has been related to her pregnancy twice, it is probably with looking into conditions such as autoimmune causes.  Will check thyroid function and ZOFIA.    Plan: Patient admitted for ERCP to further evaluate biliary ducts for any residual or obstructive pathology. No signs of acute pancreatitis at this time. Keep NPO for the procedure, continue pain control, and await GI input.  Check procalcitonin, ESR and CRP. May consider a fibroscan as well. Defer to GI    2. Cholelithiasis Status Post Cholecystectomy    Etiology: ?History of gallstones with prior cholecystectomy.  Patient denied presence of gallstones.  Yesterday s CT showed no dilation of the common bile duct.    Plan: Case reviewed with GI, who recommended ERCP for further assessment. Hold  off on additional imaging for now unless indicated by findings from ERCP or symptom changes.    3.? Lower Abdominal Pain- No major complaints. Gyn consulted  to evaluate.     Etiology: CT showed hypodense material within the endometrial cavity, which may indicate a mildly thickened endometrial stripe or retained products of conception.  Plan: consider a pelvic ultrasound to further assess endometrial findings. Consult gynecology for evaluation and any additional management based on ultrasound findings.    4. Depression    Etiology: Chronic condition managed with pharmacotherapy.  Plan: Continue Wellbutrin as prescribed for mood stabilization.  Anxiety    Etiology: Chronic condition, co-managed with Wellbutrin.  Plan: Continue Wellbutrin and provide emotional support as needed during hospitalization.    5. Postpartum Status    Etiology: Recent spontaneous vaginal delivery, currently without complications.  Plan: No active concerns apart from abnormal CT findings in the pelvic region. Follow up on ultrasound and gynecology recommendations as indicated.    6.Other Medical Problems    Plan: Remain stable and do not require changes to current management at this time.  The patient and family have been informed of the treatment plan, with multidisciplinary input from GI and gynecology for comprehensive evaluation.            Diet: NPO for Medical/Clinical Reasons Except for: Meds    DVT Prophylaxis: Pneumatic Compression Devices  Keys Catheter: Not present  Lines: None     Cardiac Monitoring: None  Code Status:  Full code    Clinically Significant Risk Factors Present on Admission                           # Obesity: Estimated body mass index is 38.47 kg/m  as calculated from the following:    Height as of this encounter: 1.524 m (5').    Weight as of this encounter: 89.4 kg (197 lb).              Disposition Plan     Medically Ready for Discharge: Anticipated in 2-4 Days           Indy Fraga MD  Hospitalist  [facility]  Securely message with [employer] (more info)  Text page via [facility] Paging/Directory     ______________________________________________________________________    Chief Complaint   Abdominal pain  B/l shoulder pain      History of Present Illness   Amanda Miller is a 24 year old female with a medical history significant for recent history of acute pancreatitis at 31 weeks of pregnancy due to cholelithiasis status post cholecystectomy who is admitted with right upper quadrant pain and worsening liver function.    Per history, She was recently seen in the emergency department yesterday for similar symptoms, which included epigastric pain radiating slightly to her back, nausea, and non-relief from home medications. Labs and imaging at that time were reassuring, with no lipase elevation and a normal CT scan, and she was discharged with prescriptions for Zofran, Carafate, and oxycodone.    Earlier today, she managed her symptoms with medications and noted some relief, allowing her to rest. However, by 5 PM, the abdominal pain persisted and did not respond to her prescribed oxycodone. Her primary care provider advised her to take an additional dose at 8 PM, which also failed to alleviate the pain, prompting her return to the ER. The patient arrived ambulatory to triage, accompanied by her , and reports no lower abdominal pain or signs of infection such as fever, diarrhea, or significant vaginal bleeding.      The patient describes the pain as epigastric with slight right upper quadrant involvement, radiating minimally to the back, and comparable to her previous pancreatitis episodes. She also experienced nausea and dry heaving without vomiting. Postpartum, she reports vaginal bleeding similar to a light period without accompanying lower abdominal pain. She recently delivered at 39 weeks, with a history of hospitalization for pancreatitis at 31 weeks gestation.     At that  "time, an MRCP showed no evidence of choledocholithiasis or retained stones, and her symptoms improved before discharge.    On today s visit, her labs revealed normal lipase levels but newly elevated liver function tests (LFTs) with an alkaline phosphatase of 399, AST of 273, ALT of 236, and total bilirubin of 2.2, resembling her lab values during her pancreatitis hospitalization. After consulting with GI, it was decided that a repeat MRCP would likely be unnecessary; instead, she is to remain NPO, with GI planning to assess her in the morning and potentially proceed with ERCP if warranted.    Patient was seen in the ER with his significant other.  It is noted that patient had a similar during and after her first pregnancy.  She reports that during her first pregnancy she developed epigastric discomfort pancreatitis after which she had cholecystectomy.  Again during his second pregnancy around 31 weeks she developed similar symptoms which is now manifesting again today.  Currently she denies any rheumatological problems in her family.        Past Medical History    Past Medical History:   Diagnosis Date    Anxiety     Depression     Depressive disorder     no medications currently, per pt \"midwife would like me to start again after pregnancy\"    Obesity     Urinary tract infection        Past Surgical History   Past Surgical History:   Procedure Laterality Date    CHOLANGIOGRAM N/A 6/1/2023    Procedure: INTRAOPERATIVE CHOLANGIOGRAMS;  Surgeon: Dimas Cloud DO;  Location: [facility] OR    ESOPHAGOSCOPY, GASTROSCOPY, DUODENOSCOPY (EGD), COMBINED N/A 6/8/2023    Procedure: ENDOSCOPIC ULTRASOUND;  Surgeon: Eleuterio Kline MD;  Location: [facility] OR    LAPAROSCOPIC CHOLECYSTECTOMY N/A 6/1/2023    Procedure: CHOLECYSTECTOMY, ROBOT-ASSISTED, LAPAROSCOPIC, USING DA LINDSAY XI,;  Surgeon: Dimas Cloud DO;  Location: [facility] OR    TONSILLECTOMY      WISDOM TOOTH EXTRACTION         Prior to Admission Medications "   Prior to Admission Medications   Prescriptions Last Dose Informant Patient Reported? Taking?   Feeding Supplies (AMEDA MILK STORAGE BAGS) MISC   No No   Si Units as needed (for storage of expressed/pumped breast milk)   acetaminophen (TYLENOL) 500 MG tablet   No No   Sig: Take 1-2 tablets (500-1,000 mg) by mouth every 8 hours as needed for mild pain or other (and adjunct with moderate or severe pain or per patient request).   buPROPion (WELLBUTRIN XL) 300 MG 24 hr tablet   Yes No   Sig: Take 300 mg by mouth every morning   docusate sodium (COLACE) 100 MG capsule   No No   Sig: Take 1 capsule (100 mg) by mouth 2 times daily.   famotidine (PEPCID) 20 MG tablet   Yes No   Sig: Take 20 mg by mouth 2 times daily   ibuprofen (ADVIL/MOTRIN) 200 MG tablet   No No   Sig: Take 3 tablets (600 mg) by mouth every 8 hours as needed for other or moderate pain (cramping).   ondansetron (ZOFRAN ODT) 4 MG ODT tab   No No   Sig: Take 1 tablet (4 mg) by mouth every 6 hours as needed for nausea.   oxyCODONE (ROXICODONE) 5 MG tablet   No No   Sig: Take 1 tablet (5 mg) by mouth every 6 hours as needed for severe pain.   sucralfate (CARAFATE) 1 GM tablet   No No   Sig: Take 1 tablet (1 g) by mouth 4 times daily.      Facility-Administered Medications: None        Review of Systems    The 10 point Review of Systems is negative other than noted in the HPI or here.     Social History   I have reviewed this patient's social history and updated it with pertinent information if needed.  Social History     Tobacco Use    Smoking status: Never     Passive exposure: Never    Smokeless tobacco: Never   Vaping Use    Vaping status: Never Used   Substance Use Topics    Alcohol use: Not Currently    Drug use: Never         Family History   I have reviewed this patient's family history and updated it with pertinent information if needed.  Family History   Problem Relation Age of Onset    Depression Mother     Depression Father     Cancer  Paternal Grandmother          Allergies   No Known Allergies     Physical Exam   Vital Signs: Temp: 97.8  F (36.6  C) Temp src: Oral BP: 106/69 Pulse: 56   Resp: 20 SpO2: 97 % O2 Device: None (Room air)    Weight: 197 lbs 0 oz      General Aox3, appropriate affect, NAD, on RA  HEENT  MMM, EOMI, PERRL  Chest Adeq E b/l, No wheezing  Heart RRR, No M/R/G  Abd- Soft, mild RUQ tndernss, BS+  - Deferred,   Extremity- Moving all extremities, No digital clubbing,   No edema  Neuro- Aox3,moving all extremities gait not checked  Skin  Has no tattoo, No skin rash     Medical Decision Making       85 MINUTES SPENT BY ME on the date of service doing chart review, history, exam, documentation & further activities per the note.      ------------------ MEDICAL DECISION MAKING ------------------------------------------------------------------------------------------------------  MANAGEMENT DISCUSSED with the following over the past 24 hours: patient and care team       Data   ------------------------- PAST 24 HR DATA REVIEWED -----------------------------------------------    I have personally reviewed the following data over the past 24 hrs:    5.8  \   13.8   / 256     140 103 10.1 /  92   3.5 25 0.84 \     ALT: 236 (H) AST: 273 (H) AP: 399 (H) TBILI: 2.2 (H)   ALB: 4.2 TOT PROTEIN: 7.1 LIPASE: 35       Imaging results reviewed over the past 24 hrs:   Recent Results (from the past 24 hours)   CT Abdomen Pelvis w Contrast    Narrative    EXAM: CT ABDOMEN PELVIS W CONTRAST  LOCATION: [facility]  DATE: 10/26/2024    INDICATION: Upper abdominal pain, history of pancreatitis.  COMPARISON: Abdominal MRI on 08/14/2024. CT abdomen and pelvis on 06/07/2023.  TECHNIQUE: CT scan of the abdomen and pelvis was performed after the injection of Isovue 370, 90 mL intravenously. Multiplanar reformats were obtained. Dose reduction techniques were used.    FINDINGS:   LOWER CHEST: Basilar pulmonary opacities, likely  atelectasis.    ABDOMEN/PELVIS:  HEPATOBILIARY: No suspicious focal hepatic lesion. The gallbladder is surgically absent.    PANCREAS: No main pancreatic ductal dilatation or definite solid pancreatic mass.    SPLEEN: No splenomegaly. Splenule along the posteromedial aspect of the spleen.    ADRENAL GLANDS: No adrenal nodules.    KIDNEYS/BLADDER: No radiodense kidney/ureteral stones or hydronephrosis in either kidney. 1.2 cm cyst at the interpolar region of the right kidney.    BOWEL: No abnormally dilated bowel loops. The appendix is visualized and appears normal.     PERITONEUM: No evidence of free fluid in the abdomen and pelvis. No free peritoneal or portal venous gas.    PELVIC ORGANS: Hypodense material within the endometrial cavity, could represent thickened endometrial stripe vs retained products of conception.    VASCULATURE: Unremarkable.    LYMPH NODES: No significant abdominopelvic lymphadenopathy.    MUSCULOSKELETAL: No suspicious osseous lesion.      Impression    IMPRESSION:  1.  No evidence of acute pathology in the abdomen and pelvis. No significant peripancreatic fat stranding to suggest the presence of acute pancreatitis.  2.  Hypodense material within the endometrial cavity, indeterminate, could represent mildly thickened endometrial stripe vs retained products of conception, can be further evaluated with pelvic ultrasound if clinically warranted.

## 2024-10-27 NOTE — ED NOTES
"[facility] ED Handoff Report    ED Chief Complaint: abdominal pain    ED Diagnosis:  (R10.13) Abdominal pain, epigastric  Comment: treating pain with dilaudid prn  Plan:     (R79.89) Abnormal LFTs  Comment: having a liver biopsy tomorrow  Plan: npo after MN       PMH:    Past Medical History:   Diagnosis Date    Anxiety     Depression     Depressive disorder     no medications currently, per pt \"midwife would like me to start again after pregnancy\"    Obesity     Urinary tract infection         Code Status:  Full Code     Falls Risk: No Band: Not applicable    Current Living Situation/Residence: lives with a significant other     Elimination Status: Continent: Yes     Activity Level: Independent    Patients Preferred Language:  English     Needed: No    Vital Signs:  /67   Pulse 68   Temp 97.8  F (36.6  C) (Oral)   Resp 16   Ht 1.524 m (5')   Wt 89.4 kg (197 lb)   LMP  (LMP Unknown)   SpO2 98%   BMI 38.47 kg/m       Cardiac Rhythm: NSR    Pain Score: 3/10    Is the Patient Confused:  No    Last Food or Drink: 10/27/24 at 1300    Focused Assessment:      Patient had a baby October 9th. --second baby-- Before when pregnant, with her first,  she had pancreatitis and elevated liver enzymes.  Same thing happen with this pregnancy--has no gallbladder--Plan is patient is going to have a liver biopsy tomorrow to rule out Primary Bilary Cholangitis--being seen by GI--Is getting dilaudid prn for belly pain--jeanine to eat today and npo after midnight--saliene locked--up independently--VSS-- in the room with patient--baby's with grandma    Tests Performed: Done: Labs and Imaging    Treatments Provided:  treat pain prn    Family Dynamics/Concerns: No    Family Updated On Visitor Policy: Yes    Plan of Care Communicated to Family: Yes    Who Was Updated about Plan of Care:     Belongings Checklist Done and Signed by Patient: Yes    Belongings Sent with Patient:     Medications sent " with patient:     Covid: asymptomatic , not done    Additional Information:     RN: Chloe Sahni RN   10/27/2024 2:01 PM

## 2024-10-27 NOTE — CONSULTS
"[facility] DIGESTIVE HEALTH CONSULTATION    Amanda Miller   [address] [place] [address]  24 year old female  Admission Date/Time: 10/26/2024 10:43 PM    Primary Care Provider:  Carla Mims    Requesting Physician: Luiz Molina MD      CHIEF COMPLAINT:   Elevated liver tests    REASON FOR THE CONSULT: I was asked to see Ms. Amanda Miller in consultation at the request of Dr. Reaves for evaluation of elevated liver tests    HPI:     Irasema is a 24-year-old woman with history of gallstone pancreatitis status postcholecystectomy, obesity, depression and recurrent elevated liver test who presents to [facility] with complaint of abdominal pain    Progressive epigastric and right upper quadrant pain for the past several days  Has had similar presentations in the past  Noted to have elevated LFTs which are also similar to presentations in the past  Has undergone EUS in June 2023 for similar presentation and found to have no sign of choledocholithiasis  Gave birth to baby several weeks ago, healthy    In the emergency department underwent CT scan abdomen pelvis, no sign of pathology in the bile duct or liver    REVIEW OF SYSTEMS: 13 point review of systems is negative except that noted above.    PAST MEDICAL HISTORY:   Past Medical History:   Diagnosis Date    Anxiety     Depression     Depressive disorder     no medications currently, per pt \"midwife would like me to start again after pregnancy\"    Obesity     Urinary tract infection        PAST SURGICAL HISTORY:   Past Surgical History:   Procedure Laterality Date    CHOLANGIOGRAM N/A 6/1/2023    Procedure: INTRAOPERATIVE CHOLANGIOGRAMS;  Surgeon: Dimas Cloud DO;  Location: [facility] OR    ESOPHAGOSCOPY, GASTROSCOPY, DUODENOSCOPY (EGD), COMBINED N/A 6/8/2023    Procedure: ENDOSCOPIC ULTRASOUND;  Surgeon: Eleuterio Kline MD;  Location: [facility] OR    LAPAROSCOPIC CHOLECYSTECTOMY N/A 6/1/2023    Procedure: CHOLECYSTECTOMY, ROBOT-ASSISTED, " LAPAROSCOPIC, USING DA LINDSAY XI,;  Surgeon: Dimas Cloud DO;  Location: [facility] OR    TONSILLECTOMY      WISDOM TOOTH EXTRACTION         FAMILY HISTORY:   Family History   Problem Relation Age of Onset    Depression Mother     Depression Father     Cancer Paternal Grandmother        SOCIAL HISTORY:   Social History     Tobacco Use    Smoking status: Never     Passive exposure: Never    Smokeless tobacco: Never   Substance Use Topics    Alcohol use: Not Currently        MEDS:  (Not in a hospital admission)      ALLERGIES/SENSITIVITIES: Patient has no known allergies.    MEDICATIONS:  Current Outpatient Medications   Medication Sig Dispense Refill    acetaminophen (TYLENOL) 500 MG tablet Take 1-2 tablets (500-1,000 mg) by mouth every 8 hours as needed for mild pain or other (and adjunct with moderate or severe pain or per patient request).      buPROPion (WELLBUTRIN XL) 300 MG 24 hr tablet Take 300 mg by mouth every morning      CALCIUM CARB-MAGNESIUM CARB PO Take 1 tablet by mouth at bedtime.      famotidine (PEPCID) 20 MG tablet Take 20 mg by mouth at bedtime.      ibuprofen (ADVIL/MOTRIN) 200 MG tablet Take 3 tablets (600 mg) by mouth every 8 hours as needed for other or moderate pain (cramping).      ondansetron (ZOFRAN ODT) 4 MG ODT tab Take 1 tablet (4 mg) by mouth every 6 hours as needed for nausea. 20 tablet 0    oxyCODONE (ROXICODONE) 5 MG tablet Take 1 tablet (5 mg) by mouth every 6 hours as needed for severe pain. 6 tablet 0    sucralfate (CARAFATE) 1 GM tablet Take 1 tablet (1 g) by mouth 4 times daily. 28 tablet 0    VITAMIN D PO Take 1 tablet by mouth at bedtime.      VITAMIN K PO Take 1 tablet by mouth at bedtime.         PHYSICAL EXAM:  Temp:  [97.8  F (36.6  C)] 97.8  F (36.6  C)  Pulse:  [54-68] 68  Resp:  [16-20] 16  BP: ()/(55-80) 111/65  SpO2:  [95 %-99 %] 98 %  Body mass index is 38.47 kg/m .  GEN: Well developed, well nourished 24 year old in no acute distress.  HEENT: sclera  anicteric, moist mucous membranes.   LYMPH: No cervical lymphadenopathy  PULM: Nonlabored breathing. Breath sounds equal.   CARDIO: Regular rate  GI: Non-distended. Soft.  Non-tender to palpation.  No guarding. No rebound tenderness.  EXT: warm, no lower extremity edema  NEURO: Alert. No focal defects.   PSYCH: Mental status appropriate, mood and affect normal.    SKIN: No rashes  MSK: No joint abnormalities    LABORATORY DATA:  CBC RESULTS:   Recent Labs   Lab Test 10/27/24  0437   WBC 5.9   RBC 5.52*   HGB 14.2   HCT 44.9   MCV 81   MCH 25.7*   MCHC 31.6   RDW 14.9           CMP Results:   Recent Labs   Lab Test 10/27/24  0437      POTASSIUM 3.4   CHLORIDE 105   CO2 22   ANIONGAP 15   GLC 80   BUN 9.5   CR 0.81   BILITOTAL 2.7*   ALKPHOS 431*   *   *      Lipase 85      INR Results:   Recent Labs   Lab Test 06/01/23  0711   INR 1.01          RELEVANT IMAGING:      EXAM: CT ABDOMEN PELVIS W CONTRAST  LOCATION: [facility]  DATE: 10/26/2024     INDICATION: Upper abdominal pain, history of pancreatitis.  COMPARISON: Abdominal MRI on 08/14/2024. CT abdomen and pelvis on 06/07/2023.  TECHNIQUE: CT scan of the abdomen and pelvis was performed after the injection of Isovue 370, 90 mL intravenously. Multiplanar reformats were obtained. Dose reduction techniques were used.     FINDINGS:   LOWER CHEST: Basilar pulmonary opacities, likely atelectasis.     ABDOMEN/PELVIS:  HEPATOBILIARY: No suspicious focal hepatic lesion. The gallbladder is surgically absent.     PANCREAS: No main pancreatic ductal dilatation or definite solid pancreatic mass.     SPLEEN: No splenomegaly. Splenule along the posteromedial aspect of the spleen.     ADRENAL GLANDS: No adrenal nodules.     KIDNEYS/BLADDER: No radiodense kidney/ureteral stones or hydronephrosis in either kidney. 1.2 cm cyst at the interpolar region of the right kidney.     BOWEL: No abnormally dilated bowel loops. The appendix is  visualized and appears normal.      PERITONEUM: No evidence of free fluid in the abdomen and pelvis. No free peritoneal or portal venous gas.     PELVIC ORGANS: Hypodense material within the endometrial cavity, could represent thickened endometrial stripe vs retained products of conception.     VASCULATURE: Unremarkable.     LYMPH NODES: No significant abdominopelvic lymphadenopathy.     MUSCULOSKELETAL: No suspicious osseous lesion.                                                                      IMPRESSION:  1.  No evidence of acute pathology in the abdomen and pelvis. No significant peripancreatic fat stranding to suggest the presence of acute pancreatitis.  2.  Hypodense material within the endometrial cavity, indeterminate, could represent mildly thickened endometrial stripe vs retained products of conception, can be further evaluated with pelvic ultrasound if clinically warranted.        EXAM: MR ABDOMEN MRCP W/O CONTRAST  LOCATION: [facility]  DATE: 8/14/2024     INDICATION: RUQ pain, hx of cholecystectomy, same sxs as when she had her gallbladder out; also 31 wks pregnant  COMPARISON: MRI/MRCP 06/07/2023  TECHNIQUE: Routine MR liver/pancreas protocol including axial and coronal MRCP sequences. 2D and 3D reconstruction performed by MR technologist including MIP reconstruction and slab cholangiograms. Multiplanar T2 images were performed. T1 in and out of   phase images as well as T1 precontrast images and diffusion weighted images performed.  CONTRAST: None.      FINDINGS:      MRCP: Prior cholecystectomy. Reservoir effect of the bile ducts. No choledocholithiasis.     LIVER: Unremarkable liver.      PANCREAS: Unremarkable pancreas.     ADDITIONAL FINDINGS: Gravid uterus. Examination not tailored for evaluation of fetal structures. Unremarkable lung bases. Unremarkable spleen. Unremarkable adrenal glands. Small right renal cyst which requires no dedicated follow-up. Mild right    hydronephrosis which may be physiologic related to pregnancy. No bowel obstruction. No lymphadenopathy. No AAA. No acute osseous abnormality.                                                                      IMPRESSION:  1.  Prior cholecystectomy. Reservoir effect of the bile ducts. No choledocholithiasis.  2.  Unremarkable pancreas.  3.  Gravid uterus.  4.  Mild right hydronephrosis which may be physiologic related to pregnancy.        Procedure:             Upper EUS   Indications:           Elevated liver enzymes   Providers:             VALERY MYERS MD   Patient Profile:       This is a 22 year old female.   Referring MD:            Medicines:             General Anesthesia   Complications:         No immediate complications.   _______________________________________________________________________________   Procedure:            Pre-Anesthesia Assessment:                          - Prior to the procedure, a History and Physical was                          performed, and patient medications, allergies and                          sensitivities were reviewed. The patient's tolerance                          of previous anesthesia was reviewed.                          After obtaining informed consent, the endoscope was                          passed under direct vision. Throughout the procedure,                          the patient's blood pressure, pulse, and oxygen                          saturations were monitored continuously. The EUS                          linear scope was introduced through the mouth, and                          advanced to the second part of duodenum. The upper EUS                          was accomplished without difficulty. The patient                          tolerated the procedure well.                                                                                    Findings:       ENDOSONOGRAPHIC FINDING: :        There was no sign of significant endosonographic  abnormality in the        entire main bile duct. The maximum diameter of the duct was 5 mm. No        stones, no biliary sludge, ducts of normal caliber and ducts with        regular contour were identified.                                                                                    Moderate Sedation:        GA   Impression:            - There was no sign of significant pathology in the                          entire main bile duct.                          - No specimens collected.   Recommendation:        - Return patient to hospital [facility] for ongoing care.                          - Advance diet as tolerated.                          - Follow LFTs to normalization.                                                               ASSESSMENT:     Amanda is a healthy 24-year-old woman whose had recurrent elevated LFTs.  She did have gallstone disease in the past as evidenced by a gallstone pancreatitis now status post cholecystectomy.  Despite this has had episodic elevations of LFTs with normal imaging of the bile duct, pancreas, liver.  Had endoscopic ultrasound under similar circumstances in June 2023 prior to pregnancy and there is no sign of choledocholithiasis    I suspect she has an autoimmune related liver disease, favor PBC versus AIH  Admission we focused on making appropriate diagnosis and ultimately treatment will be followed up in our liver clinic    PLAN:    -Labs ordered including ZOFIA, AMA, smooth muscle antibody, immunoglobulins, INR  - Plan for ultrasound-guided liver biopsy tomorrow.  I put a consult in for interventional radiology.  Will make n.p.o. at midnight for this  - Likely will not have the pathology results back for several days at least until clinically well tomorrow could be discharged from the hospital with a plan to follow-up in liver clinic       Thank you so much for involving me in her care.  60-minute spent with direct patient care, clinical decision making, education at the  bedside and care team coordination        Boom Betancur MD  Thank you for the opportunity to participate in the care of this patient.   Please feel free to call me with any questions or concerns.  Phone number [phone].            CC: [facility], Carla Mims

## 2024-10-28 ENCOUNTER — APPOINTMENT (OUTPATIENT)
Dept: ULTRASOUND IMAGING | Facility: HOSPITAL | Age: 24
End: 2024-10-28
Attending: RADIOLOGY
Payer: COMMERCIAL

## 2024-10-28 LAB
ALBUMIN SERPL BCG-MCNC: 3.5 G/DL (ref 3.5–5.2)
ALP SERPL-CCNC: 417 U/L (ref 40–150)
ALT SERPL W P-5'-P-CCNC: 322 U/L (ref 0–50)
ANA SER QL IF: NEGATIVE
AST SERPL W P-5'-P-CCNC: 194 U/L (ref 0–45)
BILIRUB DIRECT SERPL-MCNC: 0.41 MG/DL (ref 0–0.3)
BILIRUB SERPL-MCNC: 0.9 MG/DL
GLUCOSE BLDC GLUCOMTR-MCNC: 76 MG/DL (ref 70–99)
HOLD SPECIMEN: NORMAL
IGA SERPL-MCNC: 97 MG/DL (ref 84–499)
IGG SERPL-MCNC: 810 MG/DL (ref 610–1616)
IGM SERPL-MCNC: 91 MG/DL (ref 35–242)
PROT SERPL-MCNC: 6.5 G/DL (ref 6.4–8.3)

## 2024-10-28 PROCEDURE — 120N000001 HC R&B MED SURG/OB

## 2024-10-28 PROCEDURE — 88342 IMHCHEM/IMCYTCHM 1ST ANTB: CPT | Mod: TC | Performed by: RADIOLOGY

## 2024-10-28 PROCEDURE — 36415 COLL VENOUS BLD VENIPUNCTURE: CPT | Performed by: INTERNAL MEDICINE

## 2024-10-28 PROCEDURE — 88307 TISSUE EXAM BY PATHOLOGIST: CPT | Mod: 26 | Performed by: PATHOLOGY

## 2024-10-28 PROCEDURE — 258N000003 HC RX IP 258 OP 636: Performed by: INTERNAL MEDICINE

## 2024-10-28 PROCEDURE — 99232 SBSQ HOSP IP/OBS MODERATE 35: CPT | Performed by: INTERNAL MEDICINE

## 2024-10-28 PROCEDURE — 0FB03ZX EXCISION OF LIVER, PERCUTANEOUS APPROACH, DIAGNOSTIC: ICD-10-PCS | Performed by: RADIOLOGY

## 2024-10-28 PROCEDURE — 88342 IMHCHEM/IMCYTCHM 1ST ANTB: CPT | Mod: 26 | Performed by: PATHOLOGY

## 2024-10-28 PROCEDURE — 250N000011 HC RX IP 250 OP 636: Performed by: RADIOLOGY

## 2024-10-28 PROCEDURE — 88341 IMHCHEM/IMCYTCHM EA ADD ANTB: CPT | Mod: 26 | Performed by: PATHOLOGY

## 2024-10-28 PROCEDURE — 82040 ASSAY OF SERUM ALBUMIN: CPT | Performed by: INTERNAL MEDICINE

## 2024-10-28 PROCEDURE — 99152 MOD SED SAME PHYS/QHP 5/>YRS: CPT

## 2024-10-28 PROCEDURE — 47000 NEEDLE BIOPSY OF LIVER PERQ: CPT

## 2024-10-28 PROCEDURE — 88312 SPECIAL STAINS GROUP 1: CPT | Mod: 26 | Performed by: PATHOLOGY

## 2024-10-28 PROCEDURE — 88365 INSITU HYBRIDIZATION (FISH): CPT | Mod: 26 | Performed by: PATHOLOGY

## 2024-10-28 PROCEDURE — 88360 TUMOR IMMUNOHISTOCHEM/MANUAL: CPT | Mod: 26 | Performed by: PATHOLOGY

## 2024-10-28 PROCEDURE — 88341 IMHCHEM/IMCYTCHM EA ADD ANTB: CPT | Mod: TC | Performed by: RADIOLOGY

## 2024-10-28 PROCEDURE — 272N000717 US BIOPSY LIVER

## 2024-10-28 PROCEDURE — 88313 SPECIAL STAINS GROUP 2: CPT | Mod: 26 | Performed by: PATHOLOGY

## 2024-10-28 PROCEDURE — 250N000013 HC RX MED GY IP 250 OP 250 PS 637: Performed by: INTERNAL MEDICINE

## 2024-10-28 PROCEDURE — 250N000011 HC RX IP 250 OP 636: Performed by: INTERNAL MEDICINE

## 2024-10-28 RX ORDER — FLUMAZENIL 0.1 MG/ML
0.2 INJECTION, SOLUTION INTRAVENOUS
Status: DISCONTINUED | OUTPATIENT
Start: 2024-10-28 | End: 2024-10-28 | Stop reason: HOSPADM

## 2024-10-28 RX ORDER — FENTANYL CITRATE 50 UG/ML
25-50 INJECTION, SOLUTION INTRAMUSCULAR; INTRAVENOUS EVERY 5 MIN PRN
Status: DISCONTINUED | OUTPATIENT
Start: 2024-10-28 | End: 2024-10-28 | Stop reason: HOSPADM

## 2024-10-28 RX ORDER — NALOXONE HYDROCHLORIDE 0.4 MG/ML
0.2 INJECTION, SOLUTION INTRAMUSCULAR; INTRAVENOUS; SUBCUTANEOUS
Status: DISCONTINUED | OUTPATIENT
Start: 2024-10-28 | End: 2024-10-28 | Stop reason: HOSPADM

## 2024-10-28 RX ORDER — NALOXONE HYDROCHLORIDE 0.4 MG/ML
0.4 INJECTION, SOLUTION INTRAMUSCULAR; INTRAVENOUS; SUBCUTANEOUS
Status: DISCONTINUED | OUTPATIENT
Start: 2024-10-28 | End: 2024-10-28 | Stop reason: HOSPADM

## 2024-10-28 RX ORDER — DEXTROSE MONOHYDRATE, SODIUM CHLORIDE, AND POTASSIUM CHLORIDE 50; 1.49; 4.5 G/1000ML; G/1000ML; G/1000ML
INJECTION, SOLUTION INTRAVENOUS CONTINUOUS
Status: DISCONTINUED | OUTPATIENT
Start: 2024-10-28 | End: 2024-10-28

## 2024-10-28 RX ADMIN — POTASSIUM CHLORIDE, DEXTROSE MONOHYDRATE AND SODIUM CHLORIDE: 150; 5; 450 INJECTION, SOLUTION INTRAVENOUS at 08:05

## 2024-10-28 RX ADMIN — FENTANYL CITRATE 50 MCG: 50 INJECTION, SOLUTION INTRAMUSCULAR; INTRAVENOUS at 13:02

## 2024-10-28 RX ADMIN — MIDAZOLAM HYDROCHLORIDE 2 MG: 1 INJECTION, SOLUTION INTRAMUSCULAR; INTRAVENOUS at 12:59

## 2024-10-28 RX ADMIN — FAMOTIDINE 20 MG: 10 INJECTION, SOLUTION INTRAVENOUS at 19:43

## 2024-10-28 RX ADMIN — MIDAZOLAM HYDROCHLORIDE 1 MG: 1 INJECTION, SOLUTION INTRAMUSCULAR; INTRAVENOUS at 13:08

## 2024-10-28 RX ADMIN — ONDANSETRON 4 MG: 2 INJECTION INTRAMUSCULAR; INTRAVENOUS at 04:11

## 2024-10-28 RX ADMIN — FENTANYL CITRATE 50 MCG: 50 INJECTION, SOLUTION INTRAMUSCULAR; INTRAVENOUS at 13:04

## 2024-10-28 RX ADMIN — BUPROPION HYDROCHLORIDE 300 MG: 150 TABLET, FILM COATED, EXTENDED RELEASE ORAL at 08:04

## 2024-10-28 RX ADMIN — FAMOTIDINE 20 MG: 10 INJECTION, SOLUTION INTRAVENOUS at 08:05

## 2024-10-28 NOTE — PROGRESS NOTES
[facility]    Medicine Progress Note - Hospitalist Service    Date of Admission:  10/26/2024    Assessment & Plan   Amanda Miller is a 24 year old female with a medical history significant for recent history of acute pancreatitis at 31 weeks of pregnancy due to cholelithiasis status post cholecystectomy who is admitted with right upper quadrant pain and worsening liver function.       Right Upper Quadrant (RUQ) pain  -Likely related to biliary issues post-cholecystectomy, with associated worsening liver function tests.  Etiology unclear.  Because this has been related to her pregnancy twice, it is probably with looking into conditions such as autoimmune causes.  Will check thyroid function and ZOFIA.  -Patient admitted for ERCP to further evaluate biliary ducts for any residual or obstructive pathology. No signs of acute pancreatitis at this time. Lipse wnl.    -NPO, continue pain control, and await GI input.    -unremarkable procalcitonin, ESR and CRP.      Cholelithiasis Status Post Cholecystectomy  -?gallstones with prior cholecystectomy.    - CT showed no dilation of the common bile duct.  -GI: ERCP    Depression/Anxiety  - Continue Wellbutrin and provide emotional support as needed during hospitalization.     Postpartum Status  -Recent spontaneous vaginal delivery, currently without complications.  -No active concerns apart from abnormal CT findings in the pelvic region. Follow up on ultrasound and gynecology recommendations as indicated.     Other Medical Problems   Remain stable and do not require changes to current management at this time.  The patient and family have been informed of the treatment plan, with multidisciplinary input from GI and gynecology for comprehensive evaluation.         Diet: NPO per Anesthesia Guidelines for Procedure/Surgery Except for: Meds    DVT Prophylaxis: Pneumatic Compression Devices  Keys Catheter: Not present  Lines: None     Cardiac Monitoring:  None  Code Status: Full Code      Clinically Significant Risk Factors                             # Obesity: Estimated body mass index is 38.47 kg/m  as calculated from the following:    Height as of this encounter: 1.524 m (5').    Weight as of this encounter: 89.4 kg (197 lb)., PRESENT ON ADMISSION            Disposition Plan     Medically Ready for Discharge: Anticipated Today             Luiz Molina MD  Hospitalist Service  [facility]  Securely message with [employer] (more info)  Text page via [facility] Paging/Directory   ______________________________________________________________________    Interval History   Feeling better, not much pain or n/v, no f/c    Physical Exam   Vital Signs: Temp: 98.1  F (36.7  C) Temp src: Oral BP: 101/56 Pulse: 73   Resp: 20 SpO2: 98 % O2 Device: None (Room air)    Weight: 197 lbs 0 oz    General.  Awake alert oriented not in acute distress.  HEENT.  Pupils equal round react to light, anicteric, EOM intact.  Neck supple no JVD.  CVS regular rhythm no murmur gallops.  Lungs.  Clear to auscultation bilateral no wheezing or rales.  Abdomen.  Soft RUQ tender bowel sounds present.  Extremities.  No edema no calf tenderness.  Neurological.  Awake and alert. No focal deficit.  Skin no rash. No pallor.  Psych. Normal mood.      Medical Decision Making       43 MINUTES SPENT BY ME on the date of service doing chart review, history, exam, documentation & further activities per the note.      Data     I have personally reviewed the following data over the past 24 hrs:    ALT: 322 (H) AST: 194 (H) AP: 417 (H) TBILI: 0.9   ALB: 3.5 TOT PROTEIN: 6.5 LIPASE: N/A       Imaging results reviewed over the past 24 hrs:   No results found for this or any previous visit (from the past 24 hours).

## 2024-10-28 NOTE — PRE-PROCEDURE
GENERAL PRE-PROCEDURE:   Procedure:  US guided liver biopsy with moderate sedation  Date/Time:  10/28/2024 12:45 PM    Written consent obtained?: Yes    Risks and benefits: Risks, benefits and alternatives were discussed    Consent given by:  Patient  Patient states understanding of procedure being performed: Yes    Patient's understanding of procedure matches consent: Yes    Procedure consent matches procedure scheduled: Yes    Expected level of sedation:  Moderate  Appropriately NPO:  Yes  ASA Class:  2  Mallampati  :  Grade 2- soft palate, base of uvula, tonsillar pillars, and portion of posterior pharyngeal wall visible  Lungs:  Lungs clear with good breath sounds bilaterally  Heart:  Normal heart sounds and rate  History & Physical reviewed:  History and physical reviewed and no updates needed  Statement of review:  I have reviewed the lab findings, diagnostic data, medications, and the plan for sedation

## 2024-10-28 NOTE — PLAN OF CARE
Problem: Pain Acute  Goal: Optimal Pain Control and Function  Outcome: Progressing   Goal Outcome Evaluation:       Patient reports minimal pain in abdomen. Went for liver biopsy this shift, biopsy site is clean, dry and intact. Vital signs stable. Taking fluids well. To be on bedrest  until 1515.IV fluids continue.

## 2024-10-28 NOTE — PROGRESS NOTES
Patient Name: Amanda Miller  Medical Record Number: [id number]  Today's Date: 10/28/2024    Procedure: Liver Biopsy  Proceduralist: Dr Carrasco    Sedation medications administered: 3 mg midazolam and 100 mcg fentanyl   Sedation time: 15 minutes    Pt transported back to room 421 awake and alert post liver biopsy.  Report given to receiving RN without questions or concerns.

## 2024-10-28 NOTE — PROGRESS NOTES
[facility] Digestive Health Progress Note    Subjective:  The patient reports feeling well today.  Denies any patient reports feeling well today.  Denies any abdominal pain, nausea, vomiting.  Denies any fever.  She reports taking vitamin K and vitamin D at home.  She denies taking any other over-the-counter medications, herbal supplements, home remedies.  No known family history of liver disease.  Denies any acholic stools.  Vaginal delivery 20 days ago.    Objective:  Vital signs in last 24 hours  Temp:  [97.5  F (36.4  C)-98.1  F (36.7  C)] 97.7  F (36.5  C)  Pulse:  [63-82] 72  Resp:  [7-39] 16  BP: (101-152)/() 110/75  SpO2:  [91 %-99 %] 91 % O2 Device: None (Room air)      Gen: A&Ox3, NAD  Eyes: No icterus  Gastrointestinal: Soft, NTTP, BS+, no rebound or guarding  Extrem: PPI, no c/c/e      LABORATORY    ELECTROLYTE PANEL   Recent Labs   Lab 10/28/24  0400 10/27/24  0437 10/26/24  2346 10/26/24  0230   NA  --  142 140 142   POTASSIUM  --  3.4 3.5 3.9   CHLORIDE  --  105 103 106   CO2  --  22 25 23   GLC 76 80 92 84   CR  --  0.81 0.84 1.00*   BUN  --  9.5 10.1 19.2      HEMATOLOGY PANEL   Recent Labs   Lab 10/27/24  1015 10/27/24  0437 10/26/24  2346 10/26/24  0230   HGB  --  14.2 13.8 14.2   MCV  --  81 81 81   WBC  --  5.9 5.8 8.8   PLT  --  241 256 269   INR 0.99  --   --   --       LIVER AND PANCREAS PANEL   Recent Labs   Lab 10/28/24  0607 10/27/24  0437 10/26/24  2346 10/26/24  0230   * 335* 273* 33   * 298* 236* 23   ALKPHOS 417* 431* 399* 199*   BILITOTAL 0.9 2.7* 2.2* 0.5   LIPASE  --   --  35 85*     IMAGING STUDIES    CT Abdomen Pelvis w Contrast    Result Date: 10/26/2024  EXAM: CT ABDOMEN PELVIS W CONTRAST LOCATION: [facility] DATE: 10/26/2024 INDICATION: Upper abdominal pain, history of pancreatitis. COMPARISON: Abdominal MRI on 08/14/2024. CT abdomen and pelvis on 06/07/2023. TECHNIQUE: CT scan of the abdomen and pelvis was performed after the injection of  Isovue 370, 90 mL intravenously. Multiplanar reformats were obtained. Dose reduction techniques were used. FINDINGS: LOWER CHEST: Basilar pulmonary opacities, likely atelectasis. ABDOMEN/PELVIS: HEPATOBILIARY: No suspicious focal hepatic lesion. The gallbladder is surgically absent. PANCREAS: No main pancreatic ductal dilatation or definite solid pancreatic mass. SPLEEN: No splenomegaly. Splenule along the posteromedial aspect of the spleen. ADRENAL GLANDS: No adrenal nodules. KIDNEYS/BLADDER: No radiodense kidney/ureteral stones or hydronephrosis in either kidney. 1.2 cm cyst at the interpolar region of the right kidney. BOWEL: No abnormally dilated bowel loops. The appendix is visualized and appears normal. PERITONEUM: No evidence of free fluid in the abdomen and pelvis. No free peritoneal or portal venous gas. PELVIC ORGANS: Hypodense material within the endometrial cavity, could represent thickened endometrial stripe vs retained products of conception. VASCULATURE: Unremarkable. LYMPH NODES: No significant abdominopelvic lymphadenopathy. MUSCULOSKELETAL: No suspicious osseous lesion.     IMPRESSION: 1.  No evidence of acute pathology in the abdomen and pelvis. No significant peripancreatic fat stranding to suggest the presence of acute pancreatitis. 2.  Hypodense material within the endometrial cavity, indeterminate, could represent mildly thickened endometrial stripe vs retained products of conception, can be further evaluated with pelvic ultrasound if clinically warranted.      I have reviewed the current diagnostic and laboratory tests.              Smooth muscle Ab (F-actin) pending  IgG 810  ZOFIA negative    TSH nml    Acute hepatitis panel negative (8/2024)    Liver biopsy pending      Assessment:  Liver enzymes.  Intermittent.  Similar presentation in August 2024.  Status post cholecystectomy.  EUS with no evidence of intraductal stone.  Negative acute hepatitis panel previously.  No toxin or exposure  history.  Acute infectious or autoimmune etiology possible.  Intraductal stone not noted on previous EUS also remains on the differential.  Liver enzymes remain elevated, bilirubin improved.  Recent pregnancy.  Vaginal delivery 20 days ago.    Patient Active Problem List   Diagnosis    Depression    Anxiety    At risk for venous thromboembolism (VTE)    BMI 40.0-44.9, adult (H)    Positive depression screening    Echogenic intracardiac focus of fetus on prenatal ultrasound    Normal delivery    Acute biliary pancreatitis without infection or necrosis    Cholelithiasis    Cholecystitis    Epigastric pain    Encounter for triage in pregnant patient    Acute pancreatitis, unspecified complication status, unspecified pancreatitis type    High-risk pregnancy, third trimester    Abdominal pain, epigastric    Abnormal LFTs       Plan:  -Await liver biopsy results.  -Repeat liver enzymes in the a.m.  -Follow-up pending labs.  -If liver enzymes continue to rise and liver biopsy/labs unremarkable, may need to consider repeat endoscopic ultrasound.    20 minutes of total time was spent providing patient care including patient evaluation, reviewing documentation/test results, and .                                                  Gustavo Garcia MD  Thank you for the opportunity to participate in the care of this patient.   Please feel free to call me with any questions or concerns.  Phone number [phone].

## 2024-10-28 NOTE — PLAN OF CARE
Problem: Pain Acute  Goal: Optimal Pain Control and Function  Outcome: Progressing     Problem: Nausea and Vomiting  Goal: Nausea and Vomiting Relief  Outcome: Progressing     Problem: Postpartum (Vaginal Delivery)  Goal: Absence of Infection Signs and Symptoms  Outcome: Progressing   Goal Outcome Evaluation:                      Pt c/o nausea this shift, PRN antiemetic administered, see MAR. Pt c/o significant nausea prior to antiemetic due time. MD updated, new order for additional antiemetic obtained. Administered, see MAR.  Pt c/o pain in abdomen this shift. IV PRN pain medications administered, pt reported the pain medications were not lasting long enough, MD updated, oral PRN pain medication dose obtained. Administered.   Pt c/o itching all over, no rash present. MD updated. New order for antihistamine obtained, administered, see MAR.  Pt pumping breastmilk and discarding milk, she is not saving or giving milk to baby currently due to current use of pain medication.   Pt voiced understanding regarding the plan for hepatic testing tomorrow. Pt inquiring as to if she can be put under general anesthesia vs local anesthetic tomorrow; due to significant anxiety regarding procedure; reports antianxiety medication make her loopy; instructed to inquire tomorrow prior to procedure.

## 2024-10-28 NOTE — PLAN OF CARE
"Pt is A&Ox4, calm, cooperative,  at bedside  - vitals stable  - NPO since midnight. 0400 blood glucose: 76  - RUQ tenderness. 0400 rated pain 1-2/10.   - complained of nausea         - IV zofran given 0410  - complained of feeling dehydrated, requested IV fluids.          - Spoke with house. House recommended oral rehydration sponges. Sponges provided.   - independent in room  - on room air    Anticipating liver biopsy 10/28     Problem: Adult Inpatient Plan of Care  Goal: Plan of Care Review  Description: The Plan of Care Review/Shift note should be completed every shift.  The Outcome Evaluation is a brief statement about your assessment that the patient is improving, declining, or no change.  This information will be displayed automatically on your shift  note.  Outcome: Progressing  Flowsheets (Taken 10/28/2024 0436)  Plan of Care Reviewed With: patient  Overall Patient Progress: no change  Goal: Patient-Specific Goal (Individualized)  Description: You can add care plan individualizations to a care plan. Examples of Individualization might be:  \"Parent requests to be called daily at 9am for status\", \"I have a hard time hearing out of my right ear\", or \"Do not touch me to wake me up as it startles  me\".  Outcome: Progressing  Goal: Absence of Hospital-Acquired Illness or Injury  Outcome: Progressing  Intervention: Identify and Manage Fall Risk  Recent Flowsheet Documentation  Taken 10/28/2024 0110 by Shauna Garzon RN  Safety Promotion/Fall Prevention:   assistive device/personal items within reach   nonskid shoes/slippers when out of bed   room near nurse's station   safety round/check completed  Intervention: Prevent Skin Injury  Recent Flowsheet Documentation  Taken 10/28/2024 0110 by Shauna Garzon RN  Body Position: position changed independently  Intervention: Prevent and Manage VTE (Venous Thromboembolism) Risk  Recent Flowsheet Documentation  Taken 10/28/2024 0110 by Shauna Garzon, " RN  VTE Prevention/Management: SCDs off (sequential compression devices)  Intervention: Prevent Infection  Recent Flowsheet Documentation  Taken 10/28/2024 0110 by Shauna Garzon RN  Infection Prevention:   rest/sleep promoted   single patient room provided   hand hygiene promoted  Goal: Optimal Comfort and Wellbeing  Outcome: Progressing  Intervention: Monitor Pain and Promote Comfort  Recent Flowsheet Documentation  Taken 10/28/2024 0110 by Shauna Garzon RN  Pain Management Interventions: declines  Intervention: Provide Person-Centered Care  Recent Flowsheet Documentation  Taken 10/28/2024 0110 by Shauna Garzon RN  Trust Relationship/Rapport:   care explained   thoughts/feelings acknowledged   questions answered   emotional support provided  Goal: Readiness for Transition of Care  Outcome: Progressing     Problem: Pain Acute  Goal: Optimal Pain Control and Function  Outcome: Progressing  Intervention: Optimize Psychosocial Wellbeing  Recent Flowsheet Documentation  Taken 10/28/2024 0110 by Shauna Garzon RN  Supportive Measures:   active listening utilized   verbalization of feelings encouraged  Intervention: Develop Pain Management Plan  Recent Flowsheet Documentation  Taken 10/28/2024 0110 by Shauna Garzon RN  Pain Management Interventions: declines  Intervention: Prevent or Manage Pain  Recent Flowsheet Documentation  Taken 10/28/2024 0110 by Shauna Garzon RN  Sensory Stimulation Regulation: lighting decreased  Sleep/Rest Enhancement:   awakenings minimized   room darkened  Medication Review/Management: medications reviewed     Problem: Nausea and Vomiting  Goal: Nausea and Vomiting Relief  Outcome: Progressing  Intervention: Prevent and Manage Nausea and Vomiting  Recent Flowsheet Documentation  Taken 10/28/2024 0410 by Shauna Garzon RN  Nausea/Vomiting Interventions: antiemetic  Taken 10/28/2024 0110 by Shauna Garzon RN  Fluid/Electrolyte Management: oral rehydration  therapy initiated  Environmental Support: calm environment promoted     Problem: Postpartum (Vaginal Delivery)  Goal: Successful Parent Role Transition  Outcome: Progressing  Intervention: Support Parent Role Transition  Recent Flowsheet Documentation  Taken 10/28/2024 0110 by Shauna Garzon RN  Supportive Measures:   active listening utilized   verbalization of feelings encouraged  Goal: Hemostasis  Outcome: Progressing  Goal: Absence of Infection Signs and Symptoms  Outcome: Progressing  Goal: Anesthesia/Sedation Recovery  Outcome: Progressing  Intervention: Optimize Anesthesia Recovery  Recent Flowsheet Documentation  Taken 10/28/2024 0110 by Shauna Garzon RN  Safety Promotion/Fall Prevention:   assistive device/personal items within reach   nonskid shoes/slippers when out of bed   room near nurse's station   safety round/check completed  Goal: Optimal Pain Control and Function  Outcome: Progressing  Intervention: Prevent or Manage Pain  Recent Flowsheet Documentation  Taken 10/28/2024 0110 by Shauna Garzon RN  Pain Management Interventions: declines  Goal: Effective Urinary Elimination  Outcome: Progressing   Goal Outcome Evaluation:      Plan of Care Reviewed With: patient    Overall Patient Progress: no changeOverall Patient Progress: no change

## 2024-10-29 VITALS
WEIGHT: 197 LBS | HEIGHT: 60 IN | HEART RATE: 90 BPM | BODY MASS INDEX: 38.68 KG/M2 | DIASTOLIC BLOOD PRESSURE: 50 MMHG | OXYGEN SATURATION: 95 % | RESPIRATION RATE: 18 BRPM | SYSTOLIC BLOOD PRESSURE: 95 MMHG | TEMPERATURE: 98.5 F

## 2024-10-29 LAB
ALBUMIN SERPL BCG-MCNC: 3.8 G/DL (ref 3.5–5.2)
ALP SERPL-CCNC: 349 U/L (ref 40–150)
ALT SERPL W P-5'-P-CCNC: 246 U/L (ref 0–50)
AST SERPL W P-5'-P-CCNC: 101 U/L (ref 0–45)
BILIRUB DIRECT SERPL-MCNC: <0.2 MG/DL (ref 0–0.3)
BILIRUB SERPL-MCNC: 0.5 MG/DL
HOLD SPECIMEN: NORMAL
MITOCHONDRIA M2 IGG SER-ACNC: 1.1 U/ML
PROT SERPL-MCNC: 6.7 G/DL (ref 6.4–8.3)
SMA IGG SER-ACNC: 7 UNITS

## 2024-10-29 PROCEDURE — 80076 HEPATIC FUNCTION PANEL: CPT | Performed by: INTERNAL MEDICINE

## 2024-10-29 PROCEDURE — 250N000011 HC RX IP 250 OP 636: Performed by: INTERNAL MEDICINE

## 2024-10-29 PROCEDURE — 99239 HOSP IP/OBS DSCHRG MGMT >30: CPT | Performed by: INTERNAL MEDICINE

## 2024-10-29 PROCEDURE — 82040 ASSAY OF SERUM ALBUMIN: CPT | Performed by: INTERNAL MEDICINE

## 2024-10-29 PROCEDURE — 36415 COLL VENOUS BLD VENIPUNCTURE: CPT | Performed by: INTERNAL MEDICINE

## 2024-10-29 PROCEDURE — 250N000013 HC RX MED GY IP 250 OP 250 PS 637: Performed by: INTERNAL MEDICINE

## 2024-10-29 RX ADMIN — BUPROPION HYDROCHLORIDE 300 MG: 150 TABLET, FILM COATED, EXTENDED RELEASE ORAL at 09:07

## 2024-10-29 RX ADMIN — FAMOTIDINE 20 MG: 10 INJECTION, SOLUTION INTRAVENOUS at 09:08

## 2024-10-29 ASSESSMENT — ACTIVITIES OF DAILY LIVING (ADL)
ADLS_ACUITY_SCORE: 0

## 2024-10-29 NOTE — PLAN OF CARE
Problem: Pain Acute  Goal: Optimal Pain Control and Function  Outcome: Progressing  Intervention: Optimize Psychosocial Wellbeing  Recent Flowsheet Documentation  Taken 10/29/2024 0035 by Bert Fox RN  Supportive Measures: active listening utilized  Intervention: Develop Pain Management Plan  Recent Flowsheet Documentation  Taken 10/29/2024 0035 by Bert Fox RN  Pain Management Interventions: declines  Intervention: Prevent or Manage Pain  Recent Flowsheet Documentation  Taken 10/29/2024 0035 by Bert Fox RN  Medication Review/Management: medications reviewed          Problem: Nausea and Vomiting  Goal: Nausea and Vomiting Relief  Outcome: Progressing  Intervention: Prevent and Manage Nausea and Vomiting  Recent Flowsheet Documentation  Taken 10/29/2024 0035 by Bert Fox RN  Environmental Support: calm environment promoted     Goal Outcome Evaluation:  Patient is aox4. Has minimal pain on RUQ. Abdomen soft w/ active bowel sounds. VSS. On RA. Liver biopsy pending. Patient is independent in the room. Call-light within reach.

## 2024-10-29 NOTE — PLAN OF CARE
Problem: Pain Acute  Goal: Optimal Pain Control and Function  Outcome: Progressing  Intervention: Optimize Psychosocial Wellbeing  Recent Flowsheet Documentation  Taken 10/28/2024 1559 by Missy Gonzalez RN  Supportive Measures:   active listening utilized   verbalization of feelings encouraged  Intervention: Prevent or Manage Pain  Recent Flowsheet Documentation  Taken 10/28/2024 1559 by Missy Gonzalez RN  Sleep/Rest Enhancement: regular sleep/rest pattern promoted  Medication Review/Management: medications reviewed     Problem: Nausea and Vomiting  Goal: Nausea and Vomiting Relief  Outcome: Progressing  Intervention: Prevent and Manage Nausea and Vomiting  Recent Flowsheet Documentation  Taken 10/28/2024 1559 by Missy Gonzalez RN  Environmental Support:   calm environment promoted   rest periods encouraged     Problem: Pancreatitis  Goal: Fluid and Electrolyte Balance  Outcome: Progressing   Goal Outcome Evaluation:  Patient has spent a quiet evening so far. Denies pain, nausea, and vomiting. I.V D51/2NS with 20 mEq KCL discontinued early part of the shift per order. Patient with good oral food and fluid intake. Ate 100% of his supper. Patient is independent in room. Left AC PIV patent. Patient's  here visiting.

## 2024-10-29 NOTE — PROGRESS NOTES
[facility] Progress Note    Subjective:  Patient reports feeling good.  Denies any abdominal pain.  Tolerated liver biopsy well.  No fever, chills, nausea.  Anticipating discharge.    Objective:  Vital signs in last 24 hours  Temp:  [97.7  F (36.5  C)-98.7  F (37.1  C)] 98.5  F (36.9  C)  Pulse:  [58-98] 90  Resp:  [7-39] 18  BP: ()/() 95/50  SpO2:  [91 %-99 %] 95 % O2 Device: None (Room air)      Gen: A&Ox3, NAD  Eyes: No icterus  Gastrointestinal: Soft, NTTP, no rebound or guarding      LABORATORY    ELECTROLYTE PANEL   Recent Labs   Lab 10/28/24  0400 10/27/24  0437 10/26/24  2346 10/26/24  0230   NA  --  142 140 142   POTASSIUM  --  3.4 3.5 3.9   CHLORIDE  --  105 103 106   CO2  --  22 25 23   GLC 76 80 92 84   CR  --  0.81 0.84 1.00*   BUN  --  9.5 10.1 19.2      HEMATOLOGY PANEL   Recent Labs   Lab 10/27/24  1015 10/27/24  0437 10/26/24  2346 10/26/24  0230   HGB  --  14.2 13.8 14.2   MCV  --  81 81 81   WBC  --  5.9 5.8 8.8   PLT  --  241 256 269   INR 0.99  --   --   --       LIVER AND PANCREAS PANEL   Recent Labs   Lab 10/29/24  0539 10/28/24  0607 10/27/24  0437 10/26/24  2346 10/26/24  0230   * 194* 335* 273* 33   * 322* 298* 236* 23   ALKPHOS 349* 417* 431* 399* 199*   BILITOTAL 0.5 0.9 2.7* 2.2* 0.5   LIPASE  --   --   --  35 85*     IMAGING STUDIES    CT Abdom    LOWER CHEST: Basilar pulmonary opacities, likely atelectasis.     ABDOMEN/PELVIS:  HEPATOBILIARY: No suspicious focal hepatic lesion. The gallbladder is surgically absent.     PANCREAS: No main pancreatic ductal dilatation or definite solid pancreatic mass.     SPLEEN: No splenomegaly. Splenule along the posteromedial aspect of the spleen.     ADRENAL GLANDS: No adrenal nodules.     KIDNEYS/BLADDER: No radiodense kidney/ureteral stones or hydronephrosis in either kidney. 1.2 cm cyst at the interpolar region of the right kidney.     BOWEL: No abnormally dilated bowel loops. The appendix is visualized and  appears normal.      PERITONEUM: No evidence of free fluid in the abdomen and pelvis. No free peritoneal or portal venous gas.     PELVIC ORGANS: Hypodense material within the endometrial cavity, could represent thickened endometrial stripe vs retained products of conception.     VASCULATURE: Unremarkable.     LYMPH NODES: No significant abdominopelvic lymphadenopathy.     MUSCULOSKELETAL: No suspicious osseous lesion.                                                                      IMPRESSION:  1.  No evidence of acute pathology in the abdomen and pelvis. No significant peripancreatic fat stranding to suggest the presence of acute pancreatitis.  2.  Hypodense material within the endometrial cavity, indeterminate, could represent mildly thickened endometrial stripe vs retained products of conception, can be further evaluated with pelvic ultrasound if clinically warranted.    I have reviewed the current diagnostic and laboratory tests.              Smooth muscle Ab (F-actin) pending  IgG 810  ZOFIA negative    TSH nml     Acute hepatitis panel negative (8/2024)    Liver biopsy pending        Assessment:  Liver enzymes.  Intermittent.  Similar presentation in August 2024.  Status post cholecystectomy.  EUS with no evidence of intraductal stone.  Negative acute hepatitis panel previously.  No toxin or exposure history.  Acute infectious or autoimmune etiology possible.  Intraductal stone not noted on previous EUS also remains on the differential.  Liver enzymes and bilirubin improving.  Recent pregnancy.  Vaginal delivery 20 days ago.         Patient Active Problem List   Diagnosis    Depression    Anxiety    At risk for venous thromboembolism (VTE)    BMI 40.0-44.9, adult (H)    Positive depression screening    Echogenic intracardiac focus of fetus on prenatal ultrasound    Normal delivery    Acute biliary pancreatitis without infection or necrosis    Cholelithiasis    Cholecystitis    Epigastric pain    Encounter  for triage in pregnant patient    Acute pancreatitis, unspecified complication status, unspecified pancreatitis type    High-risk pregnancy, third trimester    Abdominal pain, epigastric    Abnormal LFTs         Plan:  -Await liver biopsy results.  -Repeat liver enzymes in 1 week.  -Follow-up pending labs.  -OK to discharge. Pt advised to return if recurrent abdominal pain, fever or jaundice.  -If liver enzymes continue to rise and liver biopsy/labs unremarkable, may need to consider repeat endoscopic ultrasound.  -Outpt GI follow up as scheduled.    15 minutes of total time was spent providing patient care including patient evaluation, reviewing documentation/test results, and .                                                  Gustavo Garcia MD  Thank you for the opportunity to participate in the care of this patient.   Please feel free to call me with any questions or concerns.  Phone number [phone].

## 2024-10-29 NOTE — DISCHARGE SUMMARY
[facility]  Hospitalist Discharge Summary      Date of Admission:  10/26/2024  Date of Discharge:  10/29/2024  Discharging Provider: Antony Landa MD  Discharge Service: Hospitalist Service    Discharge Diagnoses   Postpartum status  Elevated liver enzymes  Right upper quadrant abdominal pain  Cholelithiasis status post cholecystectomy  Mild chronic hepatitis    Addendum 11/12/2024  Pathology of liver biopsy performed 10/28/2024 showed mild chronic hepatitis with slight increase in cytotoxic T-cell lymphocyte and eosinophils.      Clinically Significant Risk Factors     # Obesity: Estimated body mass index is 38.47 kg/m  as calculated from the following:    Height as of this encounter: 1.524 m (5').    Weight as of this encounter: 89.4 kg (197 lb).       Follow-ups Needed After Discharge   Follow-up Appointments       Hospital Follow-up with Existing Primary Care Provider (PCP)      Recheck liver enzymes in the next 1 week  Follow-up smooth muscle antibody  Return to the ER if abdominal pain, fever or jaundice recur  Consider repeat endoscopic ultrasound if LFTs worsening and liver biopsy result is unremarkable  Outpatient follow-up at the GI clinic as arranged    Schedule Primary Care visit within: 7 Days               Unresulted Labs Ordered in the Past 30 Days of this Admission       Date and Time Order Name Status Description    10/28/2024  1:26 PM Surgical Pathology Exam In process         These results will be followed up by PCP and GI service    Discharge Disposition   Discharged to home  Condition at discharge: Stable    Hospital Course   Amanda Miller is 24-year-old woman with history of gallstone pancreatitis s/p post-cholecystectomy, obesity, depression, recent vaginal delivery and recurrent elevated LFTs, admitted on 10/26/2024 with progressive epigastric and right upper quadrant pain     She had a similar episode in August 2024 at which time she experienced recurrent  abdominal pain with elevated LFTs. EUS and imaging studies performed at that time were unremarkable.  CT scan of the abdomen and pelvis performed on admission showed no significant pathology in the bile ducts, liver, or pancreas. Based on multiple negative workups, the gastroenterologist suspects autoimmune liver diseases, such as primary biliary cholangitis or autoimmune hepatitis as possible causes. Liver biopsy was performed on 10/28/2024, with results pending. Gastroenterologist recommends repeat LFT in one week, along with follow-up on biopsy findings and anti-smooth muscle antibody results.    LFTs have improved, and the patient reports feeling better. She is advised advised to return to the ER if she experiences recurrent abdominal pain, fever, or jaundice. She is clinically stable for discharge at this time.    Consultations This Hospital Stay   GASTROENTEROLOGY IP CONSULT  OB GYN IP CONSULT  INTERVENTIONAL RADIOLOGY ADULT/PEDS IP CONSULT    Code Status   Full Code    Time Spent on this Encounter   I, Antony Landa MD, personally saw the patient today and spent greater than 30 minutes discharging this patient.       Antony Landa MD  [place] [address]  Phone: [phone]  Fax: [phone]  ______________________________________________________________________    Physical Exam   Vital Signs: Temp: 98.5  F (36.9  C) Temp src: Oral BP: 95/50 Pulse: 90   Resp: 18 SpO2: 95 % O2 Device: None (Room air) Oxygen Delivery: 2 LPM  Weight: 197 lbs 0 oz    Physical Exam  General appearance: Awake, Alert, Cooperative, not in any obvious distress and appears stated age   HEENT: Normocephalic, atraumatic, conjunctiva clear without icterus and ears without discharge  Lungs: Clear to auscultation bilaterally, no wheezing, good air exchange, normal work of breathing  Cardiovascular: Regular Rate and Rythm, normal apical impulse, normal S1 and S2,  no lower extremity edema bilaterally  Abdomen: Soft, non-tender and Non-distended, active bowel sounds  Skin: Skin color, texture normal and bruising or bleeding. No rashes or lesions over face, neck, arms and legs, turgor normal.  Musculoskeletal: No bony deformities or joint tenderness. Normal ROM upon flexion & extension.   Neurologic: Alert & Oriented X 3, Facial symmetry preserved and upper & lower extremities moving well with symmetry  Psychiatric: Calm, normal eye contact and normal affect         Primary Care Physician   Carla Mims    Discharge Orders      Reason for your hospital stay    Postpartum status  Elevated liver enzymes  Right upper quadrant abdominal pain  Cholelithiasis status post cholecystectomy     Activity    Your activity upon discharge: activity as tolerated     Discharge Instructions    Follow-up liver biopsy result  Recheck liver enzymes in the next 1 week  Follow-up smooth muscle antibody  Return to the ER if abdominal pain, fever or jaundice recur  Consider repeat endoscopic ultrasound if LFTs worsening and liver biopsy result is unremarkable  Outpatient follow-up at the GI clinic as arranged     Diet    Follow this diet upon discharge: Current Diet:Orders Placed This Encounter      Advance Diet as Tolerated: Regular Diet Adult     Hospital Follow-up with Existing Primary Care Provider (PCP)    Recheck liver enzymes in the next 1 week  Follow-up smooth muscle antibody  Return to the ER if abdominal pain, fever or jaundice recur  Consider repeat endoscopic ultrasound if LFTs worsening and liver biopsy result is unremarkable  Outpatient follow-up at the GI clinic as arranged       Significant Results and Procedures   Most Recent 2 LFT's:  Recent Labs   Lab Test 10/29/24  0539 10/28/24  0607   * 194*   * 322*   ALKPHOS 349* 417*   BILITOTAL 0.5 0.9   ,   Results for orders placed or performed during the hospital encounter of 10/26/24   US Biopsy Liver    Narrative     EXAM:  1. PERCUTANEOUS BIOPSY LIVER  2. ULTRASOUND GUIDANCE  3. CONSCIOUS SEDATION  LOCATION: [facility]  DATE: 10/28/2024    INDICATION: Random liver biopsy for abnormal liver function tests.    PROCEDURE: Informed consent obtained. Site marked. Prior images reviewed. Required items made available. Patient identity was confirmed verbally and with arm band. Patient reevaluated immediately before administering sedation. Universal protocol was   followed. Time out performed. The site was prepped and draped in sterile fashion. 10 mL of 1 percent lidocaine was infused into the local soft tissues. Using standard technique and under direct ultrasound guidance, a 18 gauge biopsy needle was used to   make three core biopsies. Tissue was submitted to Pathology.     The patient tolerated the procedure well. No complications.    SEDATION: Versed 3 mg. Fentanyl 100 mcg. The procedure was performed with administration intravenous conscious sedation with appropriate preoperative, intraoperative, and postoperative evaluation.    10 minutes of supervised face to face conscious sedation time was provided by a radiology nurse under my direct supervision.      Impression    IMPRESSION:  Status post US-guided biopsy liver.    Reference CPT Code: 44265, 07845, 48071       Discharge Medications   Current Discharge Medication List        CONTINUE these medications which have NOT CHANGED    Details   acetaminophen (TYLENOL) 500 MG tablet Take 1-2 tablets (500-1,000 mg) by mouth every 8 hours as needed for mild pain or other (and adjunct with moderate or severe pain or per patient request).    Associated Diagnoses: Acute biliary pancreatitis without infection or necrosis      buPROPion (WELLBUTRIN XL) 300 MG 24 hr tablet Take 300 mg by mouth every morning      CALCIUM CARB-MAGNESIUM CARB PO Take 1 tablet by mouth at bedtime.      famotidine (PEPCID) 20 MG tablet Take 20 mg by mouth at bedtime.      ibuprofen  (ADVIL/MOTRIN) 200 MG tablet Take 3 tablets (600 mg) by mouth every 8 hours as needed for other or moderate pain (cramping).    Associated Diagnoses:  (normal spontaneous vaginal delivery)      ondansetron (ZOFRAN ODT) 4 MG ODT tab Take 1 tablet (4 mg) by mouth every 6 hours as needed for nausea.  Qty: 20 tablet, Refills: 0      oxyCODONE (ROXICODONE) 5 MG tablet Take 1 tablet (5 mg) by mouth every 6 hours as needed for severe pain.  Qty: 6 tablet, Refills: 0      sucralfate (CARAFATE) 1 GM tablet Take 1 tablet (1 g) by mouth 4 times daily.  Qty: 28 tablet, Refills: 0      VITAMIN D PO Take 1 tablet by mouth at bedtime.      VITAMIN K PO Take 1 tablet by mouth at bedtime.           Allergies   No Known Allergies

## 2024-10-29 NOTE — PLAN OF CARE
"  Problem: Adult Inpatient Plan of Care  Goal: Plan of Care Review  Description: The Plan of Care Review/Shift note should be completed every shift.  The Outcome Evaluation is a brief statement about your assessment that the patient is improving, declining, or no change.  This information will be displayed automatically on your shift  note.  Outcome: Progressing  Flowsheets (Taken 10/29/2024 1019)  Plan of Care Reviewed With: patient  Goal: Patient-Specific Goal (Individualized)  Description: You can add care plan individualizations to a care plan. Examples of Individualization might be:  \"Parent requests to be called daily at 9am for status\", \"I have a hard time hearing out of my right ear\", or \"Do not touch me to wake me up as it startles  me\".  Outcome: Progressing  Goal: Absence of Hospital-Acquired Illness or Injury  Outcome: Progressing  Intervention: Identify and Manage Fall Risk  Recent Flowsheet Documentation  Taken 10/29/2024 0900 by Venu Kwon RN  Safety Promotion/Fall Prevention:   clutter free environment maintained   lighting adjusted  Intervention: Prevent Infection  Recent Flowsheet Documentation  Taken 10/29/2024 0900 by Venu Kwon RN  Infection Prevention:   hand hygiene promoted   rest/sleep promoted   single patient room provided     Problem: Pain Acute  Goal: Optimal Pain Control and Function  Outcome: Progressing  Intervention: Optimize Psychosocial Wellbeing  Recent Flowsheet Documentation  Taken 10/29/2024 0900 by Venu Kwon RN  Supportive Measures:   active listening utilized   positive reinforcement provided   problem-solving facilitated  Intervention: Prevent or Manage Pain  Recent Flowsheet Documentation  Taken 10/29/2024 0900 by Venu Kwon RN  Medication Review/Management: medications reviewed     Problem: Pancreatitis  Goal: Fluid and Electrolyte Balance  Outcome: Progressing  Goal: Absence of Infection Signs and Symptoms  Outcome: Progressing   Goal Outcome " Evaluation:      Plan of Care Reviewed With: patient

## 2024-10-31 ENCOUNTER — PATIENT OUTREACH (OUTPATIENT)
Dept: CARE COORDINATION | Facility: CLINIC | Age: 24
End: 2024-10-31
Payer: COMMERCIAL

## 2024-10-31 NOTE — PROGRESS NOTES
"Clinic Care Coordination Contact  Transitions of Care Outreach  Chief Complaint   Patient presents with    Clinic Care Coordination - Post Hospital       Most Recent Admission Date: 10/26/2024   Most Recent Admission Diagnosis: Abdominal pain, epigastric - R10.13  Abnormal LFTs - R79.89     Most Recent Discharge Date: 10/29/2024   Most Recent Discharge Diagnosis: Abdominal pain, epigastric - R10.13  Abnormal LFTs - R79.89     Transitions of Care Assessment    Discharge Assessment  How are you doing now that you are home?: \" Doing okay no pain or anything \"  How are your symptoms? (Red Flag symptoms escalate to triage hotline per guidelines): Improved  Do you know how to contact your clinic care team if you have future questions or changes to your health status? : Yes  Does the patient have their discharge instructions? : Yes  Does the patient have questions regarding their discharge instructions? : No  Were you started on any new medications or were there changes to any of your previous medications? : Yes  Does the patient have all of their medications?: Yes  Do you have questions regarding any of your medications? : No  Do you have all of your needed medical supplies or equipment (DME)?  (i.e. oxygen tank, CPAP, cane, etc.): Yes    Post-op ([facility] Only)  If the patient had a surgery or procedure, do they have any questions for a nurse?: No             Follow up Plan     Discharge Follow-Up  Discharge follow up appointment scheduled in alignment with recommended follow up timeframe or Transitions of Risk Category? (Low = within 30 days; Moderate= within 14 days; High= within 7 days): No    No future appointments.    Outpatient Plan as outlined on AVS reviewed with patient.    For any urgent concerns, please contact our 24 hour nurse triage line: [phone] ([phone])       Yanci Nicole MA              "

## 2024-11-11 ENCOUNTER — E-VISIT (OUTPATIENT)
Dept: URGENT CARE | Facility: CLINIC | Age: 24
End: 2024-11-11
Payer: COMMERCIAL

## 2024-11-11 DIAGNOSIS — B35.4 TINEA CORPORIS: Primary | ICD-10-CM

## 2024-11-11 RX ORDER — CLOTRIMAZOLE 1 %
CREAM (GRAM) TOPICAL 2 TIMES DAILY
Qty: 60 G | Refills: 1 | Status: SHIPPED | OUTPATIENT
Start: 2024-11-11

## 2024-11-11 NOTE — PATIENT INSTRUCTIONS
"Dear Amanda Miller    After reviewing your responses, I've been able to diagnose you with \"tinea\" which is a common skin condition caused by a fungal infection. There are many common names for this depending on where it is located and it's appearance (jock itch, athlete's foot, ringworm, etc).  Based on your responses, I have prescribed clotrimazole to treat this. Please follow the instructions on the medication. If you experience irritation of your skin, new rash, or any other new symptoms, you should stop using this medication and contact your primary care provider.  If this treatment does not work for you in 2 weeks or after completing treatment, please plan to follow-up with your primary care provider to evaluate in-person.  Self-care:  Wash all items that come into contact with infected skin: Wash all towels, clothes, and bedding in hot water. Use laundry soap. Clean shower stalls, mats, and floors with a germ-killing or fungus-killing .   Do not share personal items: Do not share towels, brushes, elder, or hair accessories.    Keep your skin, hair, and nails clean and dry: Bathe every day, and dry your skin before you put medicine on the infected area. Wash your hands often. Do not scratch your sores. This may cause the infection to spread.   Avoid infected pets: A patch of missing fur is a sign of infection in a pet. Take your infected pet to a  for treatment.  Contact your primary healthcare provider if:  You have a fever.    Your infection continues to spread after 7 days of treatment.   Your rash is not gone in 2 weeks.   The area around your rash becomes red, warm, tender, swollen, or smells bad.   You have questions or concerns about your condition or care.    Thanks for choosing?us?as your health care partner,    BLAYNE DELGADO CNP  "

## 2024-11-12 ENCOUNTER — LAB REQUISITION (OUTPATIENT)
Dept: LAB | Facility: CLINIC | Age: 24
End: 2024-11-12
Payer: COMMERCIAL

## 2024-11-12 LAB
PATH REPORT.ADDENDUM SPEC: NORMAL
PATH REPORT.COMMENTS IMP SPEC: NORMAL
PATH REPORT.FINAL DX SPEC: NORMAL
PATH REPORT.FINAL DX SPEC: NORMAL
PATH REPORT.GROSS SPEC: NORMAL
PATH REPORT.GROSS SPEC: NORMAL
PATH REPORT.MICROSCOPIC SPEC OTHER STN: NORMAL
PATH REPORT.MICROSCOPIC SPEC OTHER STN: NORMAL
PATH REPORT.RELEVANT HX SPEC: NORMAL
PATH REPORT.SITE OF ORIGIN SPEC: NORMAL
PHOTO IMAGE: NORMAL

## 2025-08-10 ENCOUNTER — HEALTH MAINTENANCE LETTER (OUTPATIENT)
Age: 25
End: 2025-08-10

## (undated) DEVICE — SOL WATER IRRIG 1000ML BOTTLE 2F7114

## (undated) DEVICE — CATH CHOLANGIOGRAM KUMAR CC-019

## (undated) DEVICE — CUSTOM PACK LAP CHOLE SBA5BLCHEA

## (undated) DEVICE — TUBING SUCTION MEDI-VAC 1/4"X20' N620A - HE

## (undated) DEVICE — DAVINCI XI DRAPE ARM 470015

## (undated) DEVICE — CATH IV ANGIO INTRO 14GA X 1 3/4" 381467

## (undated) DEVICE — SUTURE VICRYL+ 4-0 UNDYED PS-2 VCP496H

## (undated) DEVICE — SU DERMABOND ADVANCED .7ML DNX12

## (undated) DEVICE — PREP CHLORAPREP 26ML TINTED HI-LITE ORANGE 930815

## (undated) DEVICE — STOPCOCK 3 WAY 500 PSI M3SNC

## (undated) DEVICE — SUCTION MANIFOLD NEPTUNE 2 SYS 1 PORT 702-025-000

## (undated) DEVICE — TUBING SMOKE EVAC PNEUMOCLEAR HIGH FLOW 0620050250

## (undated) DEVICE — SYR 50ML SLIP TIP W/O NDL 309654

## (undated) DEVICE — DAVINCI XI OBTURATOR BLADELESS 8MM 470359

## (undated) DEVICE — DECANTER VIAL 2006S

## (undated) DEVICE — DAVINCI XI SUCTION IRRIGATOR ENDOWRIST 480299

## (undated) DEVICE — SUTURE VICRYL+ 0 27IN CT-1 UND VCP260H

## (undated) DEVICE — NDL INSUFFLATION 13GA 120MM C2201

## (undated) DEVICE — GLOVE UNDER INDICATOR PI SZ 7.0 LF 41670

## (undated) DEVICE — DAVINCI XI SEAL UNIVERSAL 5-8MM 470361

## (undated) DEVICE — DRAPE SHEET REV FOLD 3/4 9349

## (undated) DEVICE — PLATE GROUNDING ADULT W/CORD 9165L

## (undated) DEVICE — DRAPE U SPLIT 74X120" 29440

## (undated) DEVICE — ENDO POUCH UNIVERSAL RETRIEVAL SYSTEM INZII 5MM CD003

## (undated) DEVICE — BLADE KNIFE SURG 11 371111

## (undated) DEVICE — SU VICRYL+ 3-0 27IN SH UND VCP416H

## (undated) DEVICE — DRAPE SHEET TABLE COVER KC 42301*

## (undated) DEVICE — ESU PENCIL SMOKE EVAC W/ROCKER SWITCH 0703-047-000

## (undated) DEVICE — SYR 20ML LL W/O NDL 302830

## (undated) DEVICE — DRAPE C-ARM 60X42" 1013

## (undated) DEVICE — LUBRICANT INST ELECTROLUBE EL101

## (undated) DEVICE — CLIP ENDO HEMO-LOC PURPLE LG 544240

## (undated) DEVICE — DAVINCI XI DRAPE COLUMN 470341

## (undated) RX ORDER — PROPOFOL 10 MG/ML
INJECTION, EMULSION INTRAVENOUS
Status: DISPENSED
Start: 2023-06-08

## (undated) RX ORDER — FENTANYL CITRATE 50 UG/ML
INJECTION, SOLUTION INTRAMUSCULAR; INTRAVENOUS
Status: DISPENSED
Start: 2023-06-01

## (undated) RX ORDER — PROPOFOL 10 MG/ML
INJECTION, EMULSION INTRAVENOUS
Status: DISPENSED
Start: 2023-06-01

## (undated) RX ORDER — KETOROLAC TROMETHAMINE 30 MG/ML
INJECTION, SOLUTION INTRAMUSCULAR; INTRAVENOUS
Status: DISPENSED
Start: 2023-06-01

## (undated) RX ORDER — DEXAMETHASONE SODIUM PHOSPHATE 10 MG/ML
INJECTION, SOLUTION INTRAMUSCULAR; INTRAVENOUS
Status: DISPENSED
Start: 2023-06-01

## (undated) RX ORDER — FENTANYL CITRATE 50 UG/ML
INJECTION, SOLUTION INTRAMUSCULAR; INTRAVENOUS
Status: DISPENSED
Start: 2023-06-08

## (undated) RX ORDER — FENTANYL CITRATE-0.9 % NACL/PF 10 MCG/ML
PLASTIC BAG, INJECTION (ML) INTRAVENOUS
Status: DISPENSED
Start: 2023-06-08

## (undated) RX ORDER — GLYCOPYRROLATE 0.2 MG/ML
INJECTION, SOLUTION INTRAMUSCULAR; INTRAVENOUS
Status: DISPENSED
Start: 2023-06-01

## (undated) RX ORDER — ONDANSETRON 2 MG/ML
INJECTION INTRAMUSCULAR; INTRAVENOUS
Status: DISPENSED
Start: 2023-06-01

## (undated) RX ORDER — FENTANYL CITRATE 50 UG/ML
INJECTION, SOLUTION INTRAMUSCULAR; INTRAVENOUS
Status: DISPENSED
Start: 2024-10-28

## (undated) RX ORDER — LIDOCAINE HYDROCHLORIDE AND EPINEPHRINE 10; 10 MG/ML; UG/ML
INJECTION, SOLUTION INFILTRATION; PERINEURAL
Status: DISPENSED
Start: 2023-06-01